# Patient Record
Sex: FEMALE | Race: WHITE | NOT HISPANIC OR LATINO | Employment: OTHER
[De-identification: names, ages, dates, MRNs, and addresses within clinical notes are randomized per-mention and may not be internally consistent; named-entity substitution may affect disease eponyms.]

---

## 2017-01-31 ENCOUNTER — RECORDS - HEALTHEAST (OUTPATIENT)
Dept: ADMINISTRATIVE | Facility: OTHER | Age: 62
End: 2017-01-31

## 2017-03-02 ENCOUNTER — RECORDS - HEALTHEAST (OUTPATIENT)
Dept: ADMINISTRATIVE | Facility: OTHER | Age: 62
End: 2017-03-02

## 2017-04-10 ENCOUNTER — RECORDS - HEALTHEAST (OUTPATIENT)
Dept: ADMINISTRATIVE | Facility: OTHER | Age: 62
End: 2017-04-10

## 2018-01-11 ENCOUNTER — RECORDS - HEALTHEAST (OUTPATIENT)
Dept: ADMINISTRATIVE | Facility: OTHER | Age: 63
End: 2018-01-11

## 2018-01-23 ENCOUNTER — AMBULATORY - HEALTHEAST (OUTPATIENT)
Dept: NURSING | Facility: CLINIC | Age: 63
End: 2018-01-23

## 2018-01-25 ENCOUNTER — COMMUNICATION - HEALTHEAST (OUTPATIENT)
Dept: INTERNAL MEDICINE | Facility: CLINIC | Age: 63
End: 2018-01-25

## 2018-02-22 ENCOUNTER — AMBULATORY - HEALTHEAST (OUTPATIENT)
Dept: NURSING | Facility: CLINIC | Age: 63
End: 2018-02-22

## 2018-03-15 ENCOUNTER — OFFICE VISIT - HEALTHEAST (OUTPATIENT)
Dept: INTERNAL MEDICINE | Facility: CLINIC | Age: 63
End: 2018-03-15

## 2018-03-15 DIAGNOSIS — E87.1 HYPONATREMIA: ICD-10-CM

## 2018-03-15 DIAGNOSIS — Z98.84 BARIATRIC SURGERY STATUS: ICD-10-CM

## 2018-03-15 DIAGNOSIS — N20.0 CALCULUS OF KIDNEY: ICD-10-CM

## 2018-03-15 DIAGNOSIS — G47.33 OBSTRUCTIVE SLEEP APNEA: ICD-10-CM

## 2018-03-15 DIAGNOSIS — Z00.00 HEALTH CARE MAINTENANCE: ICD-10-CM

## 2018-03-15 DIAGNOSIS — Z11.59 NEED FOR HEPATITIS C SCREENING TEST: ICD-10-CM

## 2018-03-15 DIAGNOSIS — I80.12: ICD-10-CM

## 2018-03-15 LAB
ALBUMIN SERPL-MCNC: 3.3 G/DL (ref 3.5–5)
ALBUMIN UR-MCNC: NEGATIVE MG/DL
ALP SERPL-CCNC: 49 U/L (ref 45–120)
ALT SERPL W P-5'-P-CCNC: 29 U/L (ref 0–45)
AMORPH CRY #/AREA URNS HPF: ABNORMAL /[HPF]
ANION GAP SERPL CALCULATED.3IONS-SCNC: 13 MMOL/L (ref 5–18)
APPEARANCE UR: CLEAR
AST SERPL W P-5'-P-CCNC: 27 U/L (ref 0–40)
BACTERIA #/AREA URNS HPF: ABNORMAL HPF
BILIRUB SERPL-MCNC: 0.4 MG/DL (ref 0–1)
BILIRUB UR QL STRIP: NEGATIVE
BUN SERPL-MCNC: 12 MG/DL (ref 8–22)
CALCIUM SERPL-MCNC: 9.1 MG/DL (ref 8.5–10.5)
CHLORIDE BLD-SCNC: 101 MMOL/L (ref 98–107)
CO2 SERPL-SCNC: 25 MMOL/L (ref 22–31)
COLOR UR AUTO: YELLOW
CREAT SERPL-MCNC: 0.73 MG/DL (ref 0.6–1.1)
ERYTHROCYTE [DISTWIDTH] IN BLOOD BY AUTOMATED COUNT: 11.5 % (ref 11–14.5)
FERRITIN SERPL-MCNC: 78 NG/ML (ref 10–130)
GFR SERPL CREATININE-BSD FRML MDRD: >60 ML/MIN/1.73M2
GLUCOSE BLD-MCNC: 109 MG/DL (ref 70–125)
GLUCOSE UR STRIP-MCNC: NEGATIVE MG/DL
HCT VFR BLD AUTO: 40.1 % (ref 35–47)
HGB BLD-MCNC: 13.5 G/DL (ref 12–16)
HGB UR QL STRIP: ABNORMAL
IRON SATN MFR SERPL: 39 % (ref 20–50)
IRON SERPL-MCNC: 135 UG/DL (ref 42–175)
KETONES UR STRIP-MCNC: NEGATIVE MG/DL
LEUKOCYTE ESTERASE UR QL STRIP: ABNORMAL
MCH RBC QN AUTO: 32.1 PG (ref 27–34)
MCHC RBC AUTO-ENTMCNC: 33.8 G/DL (ref 32–36)
MCV RBC AUTO: 95 FL (ref 80–100)
NITRATE UR QL: NEGATIVE
PH UR STRIP: 7 [PH] (ref 5–8)
PLATELET # BLD AUTO: 345 THOU/UL (ref 140–440)
PMV BLD AUTO: 7.7 FL (ref 7–10)
POTASSIUM BLD-SCNC: 4.7 MMOL/L (ref 3.5–5)
PROT SERPL-MCNC: 5.9 G/DL (ref 6–8)
RBC # BLD AUTO: 4.22 MILL/UL (ref 3.8–5.4)
RBC #/AREA URNS AUTO: ABNORMAL HPF
SODIUM SERPL-SCNC: 139 MMOL/L (ref 136–145)
SP GR UR STRIP: 1.01 (ref 1–1.03)
SQUAMOUS #/AREA URNS AUTO: ABNORMAL LPF
TIBC SERPL-MCNC: 343 UG/DL (ref 313–563)
TRANSFERRIN SERPL-MCNC: 275 MG/DL (ref 212–360)
UROBILINOGEN UR STRIP-ACNC: ABNORMAL
WBC #/AREA URNS AUTO: ABNORMAL HPF
WBC: 5.4 THOU/UL (ref 4–11)

## 2018-03-15 ASSESSMENT — MIFFLIN-ST. JEOR: SCORE: 1369.84

## 2018-03-16 LAB — HCV AB SERPL QL IA: NEGATIVE

## 2018-03-22 ENCOUNTER — HOSPITAL ENCOUNTER (OUTPATIENT)
Dept: MAMMOGRAPHY | Facility: CLINIC | Age: 63
Discharge: HOME | End: 2018-03-22
Attending: INTERNAL MEDICINE

## 2018-03-22 DIAGNOSIS — Z12.31 VISIT FOR SCREENING MAMMOGRAM: ICD-10-CM

## 2018-03-26 ENCOUNTER — AMBULATORY - HEALTHEAST (OUTPATIENT)
Dept: NURSING | Facility: CLINIC | Age: 63
End: 2018-03-26

## 2018-04-26 ENCOUNTER — AMBULATORY - HEALTHEAST (OUTPATIENT)
Dept: NURSING | Facility: CLINIC | Age: 63
End: 2018-04-26

## 2018-05-24 ENCOUNTER — COMMUNICATION - HEALTHEAST (OUTPATIENT)
Dept: INTERNAL MEDICINE | Facility: CLINIC | Age: 63
End: 2018-05-24

## 2018-05-25 ENCOUNTER — AMBULATORY - HEALTHEAST (OUTPATIENT)
Dept: NURSING | Facility: CLINIC | Age: 63
End: 2018-05-25

## 2018-06-26 ENCOUNTER — AMBULATORY - HEALTHEAST (OUTPATIENT)
Dept: NURSING | Facility: CLINIC | Age: 63
End: 2018-06-26

## 2018-07-26 ENCOUNTER — AMBULATORY - HEALTHEAST (OUTPATIENT)
Dept: NURSING | Facility: CLINIC | Age: 63
End: 2018-07-26

## 2018-08-28 ENCOUNTER — AMBULATORY - HEALTHEAST (OUTPATIENT)
Dept: NURSING | Facility: CLINIC | Age: 63
End: 2018-08-28

## 2018-09-17 ENCOUNTER — OFFICE VISIT - HEALTHEAST (OUTPATIENT)
Dept: INTERNAL MEDICINE | Facility: CLINIC | Age: 63
End: 2018-09-17

## 2018-09-17 DIAGNOSIS — I10 ESSENTIAL HYPERTENSION: ICD-10-CM

## 2018-09-28 ENCOUNTER — AMBULATORY - HEALTHEAST (OUTPATIENT)
Dept: NURSING | Facility: CLINIC | Age: 63
End: 2018-09-28

## 2018-10-25 ENCOUNTER — AMBULATORY - HEALTHEAST (OUTPATIENT)
Dept: INTERNAL MEDICINE | Facility: CLINIC | Age: 63
End: 2018-10-25

## 2018-10-29 ENCOUNTER — AMBULATORY - HEALTHEAST (OUTPATIENT)
Dept: NURSING | Facility: CLINIC | Age: 63
End: 2018-10-29

## 2018-10-29 DIAGNOSIS — Z23 NEED FOR VACCINATION: ICD-10-CM

## 2018-11-29 ENCOUNTER — AMBULATORY - HEALTHEAST (OUTPATIENT)
Dept: NURSING | Facility: CLINIC | Age: 63
End: 2018-11-29

## 2018-12-31 ENCOUNTER — AMBULATORY - HEALTHEAST (OUTPATIENT)
Dept: NURSING | Facility: CLINIC | Age: 63
End: 2018-12-31

## 2019-01-31 ENCOUNTER — AMBULATORY - HEALTHEAST (OUTPATIENT)
Dept: NURSING | Facility: CLINIC | Age: 64
End: 2019-01-31

## 2019-03-05 ENCOUNTER — AMBULATORY - HEALTHEAST (OUTPATIENT)
Dept: NURSING | Facility: CLINIC | Age: 64
End: 2019-03-05

## 2019-03-28 ENCOUNTER — OFFICE VISIT - HEALTHEAST (OUTPATIENT)
Dept: INTERNAL MEDICINE | Facility: CLINIC | Age: 64
End: 2019-03-28

## 2019-03-28 DIAGNOSIS — Z00.00 HEALTHCARE MAINTENANCE: ICD-10-CM

## 2019-03-28 DIAGNOSIS — G47.33 OBSTRUCTIVE SLEEP APNEA: ICD-10-CM

## 2019-03-28 DIAGNOSIS — Z86.0100 HISTORY OF COLONIC POLYPS: ICD-10-CM

## 2019-03-28 DIAGNOSIS — Z51.81 MEDICATION MONITORING ENCOUNTER: ICD-10-CM

## 2019-03-28 DIAGNOSIS — I10 ESSENTIAL HYPERTENSION: ICD-10-CM

## 2019-03-28 DIAGNOSIS — Z00.00 ROUTINE GENERAL MEDICAL EXAMINATION AT A HEALTH CARE FACILITY: ICD-10-CM

## 2019-03-28 DIAGNOSIS — E55.9 VITAMIN D DEFICIENCY: ICD-10-CM

## 2019-03-28 DIAGNOSIS — Z98.84 BARIATRIC SURGERY STATUS: ICD-10-CM

## 2019-03-28 DIAGNOSIS — N20.0 CALCULUS OF KIDNEY: ICD-10-CM

## 2019-03-28 DIAGNOSIS — L72.0 MILIUM CYST: ICD-10-CM

## 2019-03-28 LAB
ALBUMIN SERPL-MCNC: 3.6 G/DL (ref 3.5–5)
ALP SERPL-CCNC: 43 U/L (ref 45–120)
ALT SERPL W P-5'-P-CCNC: 16 U/L (ref 0–45)
ANION GAP SERPL CALCULATED.3IONS-SCNC: 11 MMOL/L (ref 5–18)
AST SERPL W P-5'-P-CCNC: 17 U/L (ref 0–40)
BILIRUB SERPL-MCNC: 0.4 MG/DL (ref 0–1)
BUN SERPL-MCNC: 11 MG/DL (ref 8–22)
CALCIUM SERPL-MCNC: 9.3 MG/DL (ref 8.5–10.5)
CHLORIDE BLD-SCNC: 97 MMOL/L (ref 98–107)
CHOLEST SERPL-MCNC: 187 MG/DL
CO2 SERPL-SCNC: 28 MMOL/L (ref 22–31)
CREAT SERPL-MCNC: 0.73 MG/DL (ref 0.6–1.1)
ERYTHROCYTE [DISTWIDTH] IN BLOOD BY AUTOMATED COUNT: 11.4 % (ref 11–14.5)
FASTING STATUS PATIENT QL REPORTED: YES
GFR SERPL CREATININE-BSD FRML MDRD: >60 ML/MIN/1.73M2
GLUCOSE BLD-MCNC: 95 MG/DL (ref 70–125)
HCT VFR BLD AUTO: 39.3 % (ref 35–47)
HDLC SERPL-MCNC: 55 MG/DL
HGB BLD-MCNC: 13 G/DL (ref 12–16)
LDLC SERPL CALC-MCNC: 105 MG/DL
MCH RBC QN AUTO: 31.8 PG (ref 27–34)
MCHC RBC AUTO-ENTMCNC: 33.1 G/DL (ref 32–36)
MCV RBC AUTO: 96 FL (ref 80–100)
PLATELET # BLD AUTO: 328 THOU/UL (ref 140–440)
PMV BLD AUTO: 8.1 FL (ref 7–10)
POTASSIUM BLD-SCNC: 3.7 MMOL/L (ref 3.5–5)
PROT SERPL-MCNC: 5.7 G/DL (ref 6–8)
RBC # BLD AUTO: 4.1 MILL/UL (ref 3.8–5.4)
SODIUM SERPL-SCNC: 136 MMOL/L (ref 136–145)
TRIGL SERPL-MCNC: 133 MG/DL
WBC: 5.6 THOU/UL (ref 4–11)

## 2019-03-28 ASSESSMENT — MIFFLIN-ST. JEOR: SCORE: 1333.17

## 2019-03-29 ENCOUNTER — COMMUNICATION - HEALTHEAST (OUTPATIENT)
Dept: INTERNAL MEDICINE | Facility: CLINIC | Age: 64
End: 2019-03-29

## 2019-03-29 DIAGNOSIS — L72.0 MILIUM CYST: ICD-10-CM

## 2019-03-29 LAB — 25(OH)D3 SERPL-MCNC: 30.1 NG/ML (ref 30–80)

## 2019-04-05 ENCOUNTER — HOSPITAL ENCOUNTER (OUTPATIENT)
Dept: MAMMOGRAPHY | Facility: CLINIC | Age: 64
Discharge: HOME | End: 2019-04-05
Attending: INTERNAL MEDICINE

## 2019-04-05 DIAGNOSIS — Z12.31 VISIT FOR SCREENING MAMMOGRAM: ICD-10-CM

## 2019-04-08 ENCOUNTER — AMBULATORY - HEALTHEAST (OUTPATIENT)
Dept: NURSING | Facility: CLINIC | Age: 64
End: 2019-04-08

## 2019-04-15 ENCOUNTER — RECORDS - HEALTHEAST (OUTPATIENT)
Dept: ADMINISTRATIVE | Facility: OTHER | Age: 64
End: 2019-04-15

## 2019-04-23 ENCOUNTER — RECORDS - HEALTHEAST (OUTPATIENT)
Dept: ADMINISTRATIVE | Facility: OTHER | Age: 64
End: 2019-04-23

## 2019-05-09 ENCOUNTER — COMMUNICATION - HEALTHEAST (OUTPATIENT)
Dept: INTERNAL MEDICINE | Facility: CLINIC | Age: 64
End: 2019-05-09

## 2019-05-09 ENCOUNTER — AMBULATORY - HEALTHEAST (OUTPATIENT)
Dept: NURSING | Facility: CLINIC | Age: 64
End: 2019-05-09

## 2019-05-24 ENCOUNTER — OFFICE VISIT - HEALTHEAST (OUTPATIENT)
Dept: INTERNAL MEDICINE | Facility: CLINIC | Age: 64
End: 2019-05-24

## 2019-05-24 DIAGNOSIS — I10 ESSENTIAL HYPERTENSION: ICD-10-CM

## 2019-05-24 DIAGNOSIS — G47.33 OBSTRUCTIVE SLEEP APNEA: ICD-10-CM

## 2019-05-24 RX ORDER — FERROUS SULFATE 325(65) MG
325 TABLET ORAL
Status: SHIPPED | COMMUNITY
Start: 2013-10-09

## 2019-06-14 ENCOUNTER — AMBULATORY - HEALTHEAST (OUTPATIENT)
Dept: NURSING | Facility: CLINIC | Age: 64
End: 2019-06-14

## 2019-06-14 ENCOUNTER — OFFICE VISIT - HEALTHEAST (OUTPATIENT)
Dept: INTERNAL MEDICINE | Facility: CLINIC | Age: 64
End: 2019-06-14

## 2019-06-14 DIAGNOSIS — I10 ESSENTIAL HYPERTENSION: ICD-10-CM

## 2019-06-14 LAB
ANION GAP SERPL CALCULATED.3IONS-SCNC: 10 MMOL/L (ref 5–18)
BUN SERPL-MCNC: 10 MG/DL (ref 8–22)
CALCIUM SERPL-MCNC: 9.3 MG/DL (ref 8.5–10.5)
CHLORIDE BLD-SCNC: 102 MMOL/L (ref 98–107)
CO2 SERPL-SCNC: 26 MMOL/L (ref 22–31)
CREAT SERPL-MCNC: 0.68 MG/DL (ref 0.6–1.1)
GFR SERPL CREATININE-BSD FRML MDRD: >60 ML/MIN/1.73M2
GLUCOSE BLD-MCNC: 85 MG/DL (ref 70–125)
POTASSIUM BLD-SCNC: 4.2 MMOL/L (ref 3.5–5)
SODIUM SERPL-SCNC: 138 MMOL/L (ref 136–145)

## 2019-06-28 ENCOUNTER — OFFICE VISIT - HEALTHEAST (OUTPATIENT)
Dept: INTERNAL MEDICINE | Facility: CLINIC | Age: 64
End: 2019-06-28

## 2019-06-28 DIAGNOSIS — I10 ESSENTIAL HYPERTENSION: ICD-10-CM

## 2019-06-28 LAB
ANION GAP SERPL CALCULATED.3IONS-SCNC: 9 MMOL/L (ref 5–18)
BUN SERPL-MCNC: 10 MG/DL (ref 8–22)
CALCIUM SERPL-MCNC: 9.3 MG/DL (ref 8.5–10.5)
CHLORIDE BLD-SCNC: 97 MMOL/L (ref 98–107)
CO2 SERPL-SCNC: 28 MMOL/L (ref 22–31)
CREAT SERPL-MCNC: 0.71 MG/DL (ref 0.6–1.1)
GFR SERPL CREATININE-BSD FRML MDRD: >60 ML/MIN/1.73M2
GLUCOSE BLD-MCNC: 97 MG/DL (ref 70–125)
POTASSIUM BLD-SCNC: 4.8 MMOL/L (ref 3.5–5)
SODIUM SERPL-SCNC: 134 MMOL/L (ref 136–145)

## 2019-07-15 ENCOUNTER — AMBULATORY - HEALTHEAST (OUTPATIENT)
Dept: NURSING | Facility: CLINIC | Age: 64
End: 2019-07-15

## 2019-08-22 ENCOUNTER — OFFICE VISIT - HEALTHEAST (OUTPATIENT)
Dept: INTERNAL MEDICINE | Facility: CLINIC | Age: 64
End: 2019-08-22

## 2019-08-22 DIAGNOSIS — G47.33 OBSTRUCTIVE SLEEP APNEA: ICD-10-CM

## 2019-08-22 DIAGNOSIS — I10 ESSENTIAL HYPERTENSION: ICD-10-CM

## 2019-08-22 DIAGNOSIS — Z98.84 BARIATRIC SURGERY STATUS: ICD-10-CM

## 2019-08-22 DIAGNOSIS — L30.9 ECZEMA, UNSPECIFIED TYPE: ICD-10-CM

## 2019-08-22 RX ORDER — TRIAMCINOLONE ACETONIDE 1 MG/G
CREAM TOPICAL
Qty: 45 G | Refills: 1 | Status: SHIPPED | OUTPATIENT
Start: 2019-08-22

## 2019-09-23 ENCOUNTER — AMBULATORY - HEALTHEAST (OUTPATIENT)
Dept: NURSING | Facility: CLINIC | Age: 64
End: 2019-09-23

## 2019-10-25 ENCOUNTER — AMBULATORY - HEALTHEAST (OUTPATIENT)
Dept: INTERNAL MEDICINE | Facility: CLINIC | Age: 64
End: 2019-10-25

## 2019-10-25 ENCOUNTER — AMBULATORY - HEALTHEAST (OUTPATIENT)
Dept: NURSING | Facility: CLINIC | Age: 64
End: 2019-10-25

## 2019-10-25 DIAGNOSIS — Z23 NEED FOR INFLUENZA VACCINATION: ICD-10-CM

## 2019-10-25 DIAGNOSIS — E53.8 VITAMIN B12 DEFICIENCY (NON ANEMIC): ICD-10-CM

## 2019-11-04 ENCOUNTER — RECORDS - HEALTHEAST (OUTPATIENT)
Dept: ADMINISTRATIVE | Facility: OTHER | Age: 64
End: 2019-11-04

## 2019-11-15 ENCOUNTER — COMMUNICATION - HEALTHEAST (OUTPATIENT)
Dept: INTERNAL MEDICINE | Facility: CLINIC | Age: 64
End: 2019-11-15

## 2019-11-15 DIAGNOSIS — I10 ESSENTIAL HYPERTENSION: ICD-10-CM

## 2019-11-26 ENCOUNTER — AMBULATORY - HEALTHEAST (OUTPATIENT)
Dept: NURSING | Facility: CLINIC | Age: 64
End: 2019-11-26

## 2019-12-27 ENCOUNTER — AMBULATORY - HEALTHEAST (OUTPATIENT)
Dept: NURSING | Facility: CLINIC | Age: 64
End: 2019-12-27

## 2020-01-27 ENCOUNTER — AMBULATORY - HEALTHEAST (OUTPATIENT)
Dept: NURSING | Facility: CLINIC | Age: 65
End: 2020-01-27

## 2020-02-28 ENCOUNTER — AMBULATORY - HEALTHEAST (OUTPATIENT)
Dept: NURSING | Facility: CLINIC | Age: 65
End: 2020-02-28

## 2020-03-30 ENCOUNTER — AMBULATORY - HEALTHEAST (OUTPATIENT)
Dept: NURSING | Facility: CLINIC | Age: 65
End: 2020-03-30

## 2020-04-30 ENCOUNTER — AMBULATORY - HEALTHEAST (OUTPATIENT)
Dept: NURSING | Facility: CLINIC | Age: 65
End: 2020-04-30

## 2020-06-01 ENCOUNTER — AMBULATORY - HEALTHEAST (OUTPATIENT)
Dept: NURSING | Facility: CLINIC | Age: 65
End: 2020-06-01

## 2020-07-06 ENCOUNTER — HOSPITAL ENCOUNTER (OUTPATIENT)
Dept: MAMMOGRAPHY | Facility: CLINIC | Age: 65
Discharge: HOME | End: 2020-07-06
Attending: INTERNAL MEDICINE

## 2020-07-06 ENCOUNTER — COMMUNICATION - HEALTHEAST (OUTPATIENT)
Dept: INTERNAL MEDICINE | Facility: CLINIC | Age: 65
End: 2020-07-06

## 2020-07-06 DIAGNOSIS — Z12.31 VISIT FOR SCREENING MAMMOGRAM: ICD-10-CM

## 2020-07-06 DIAGNOSIS — I10 ESSENTIAL HYPERTENSION: ICD-10-CM

## 2020-07-07 ENCOUNTER — AMBULATORY - HEALTHEAST (OUTPATIENT)
Dept: NURSING | Facility: CLINIC | Age: 65
End: 2020-07-07

## 2020-08-19 ENCOUNTER — COMMUNICATION - HEALTHEAST (OUTPATIENT)
Dept: INTERNAL MEDICINE | Facility: CLINIC | Age: 65
End: 2020-08-19

## 2020-08-19 DIAGNOSIS — I10 ESSENTIAL HYPERTENSION: ICD-10-CM

## 2020-08-20 ENCOUNTER — OFFICE VISIT - HEALTHEAST (OUTPATIENT)
Dept: INTERNAL MEDICINE | Facility: CLINIC | Age: 65
End: 2020-08-20

## 2020-08-20 DIAGNOSIS — E55.9 VITAMIN D DEFICIENCY: ICD-10-CM

## 2020-08-20 DIAGNOSIS — G47.33 OBSTRUCTIVE SLEEP APNEA: ICD-10-CM

## 2020-08-20 DIAGNOSIS — Z98.84 BARIATRIC SURGERY STATUS: ICD-10-CM

## 2020-08-20 DIAGNOSIS — Z86.0100 HISTORY OF COLONIC POLYPS: ICD-10-CM

## 2020-08-20 DIAGNOSIS — I10 ESSENTIAL HYPERTENSION: ICD-10-CM

## 2020-08-20 DIAGNOSIS — Z00.00 HEALTHCARE MAINTENANCE: ICD-10-CM

## 2020-08-20 DIAGNOSIS — Z51.81 MEDICATION MONITORING ENCOUNTER: ICD-10-CM

## 2020-08-20 DIAGNOSIS — E53.8 VITAMIN B12 DEFICIENCY (NON ANEMIC): ICD-10-CM

## 2020-08-20 DIAGNOSIS — Z00.00 ROUTINE GENERAL MEDICAL EXAMINATION AT A HEALTH CARE FACILITY: ICD-10-CM

## 2020-08-20 LAB
ALBUMIN SERPL-MCNC: 3.3 G/DL (ref 3.5–5)
ALP SERPL-CCNC: 41 U/L (ref 45–120)
ALT SERPL W P-5'-P-CCNC: 20 U/L (ref 0–45)
ANION GAP SERPL CALCULATED.3IONS-SCNC: 11 MMOL/L (ref 5–18)
AST SERPL W P-5'-P-CCNC: 20 U/L (ref 0–40)
BILIRUB SERPL-MCNC: 0.5 MG/DL (ref 0–1)
BUN SERPL-MCNC: 12 MG/DL (ref 8–22)
CALCIUM SERPL-MCNC: 9.2 MG/DL (ref 8.5–10.5)
CHLORIDE BLD-SCNC: 101 MMOL/L (ref 98–107)
CHOLEST SERPL-MCNC: 201 MG/DL
CO2 SERPL-SCNC: 29 MMOL/L (ref 22–31)
CREAT SERPL-MCNC: 0.75 MG/DL (ref 0.6–1.1)
ERYTHROCYTE [DISTWIDTH] IN BLOOD BY AUTOMATED COUNT: 11.2 % (ref 11–14.5)
FASTING STATUS PATIENT QL REPORTED: YES
GFR SERPL CREATININE-BSD FRML MDRD: >60 ML/MIN/1.73M2
GLUCOSE BLD-MCNC: 116 MG/DL (ref 70–125)
HCT VFR BLD AUTO: 39.3 % (ref 35–47)
HDLC SERPL-MCNC: 55 MG/DL
HGB BLD-MCNC: 13.4 G/DL (ref 12–16)
LDLC SERPL CALC-MCNC: 115 MG/DL
MCH RBC QN AUTO: 32.9 PG (ref 27–34)
MCHC RBC AUTO-ENTMCNC: 34.1 G/DL (ref 32–36)
MCV RBC AUTO: 96 FL (ref 80–100)
PLATELET # BLD AUTO: 312 THOU/UL (ref 140–440)
PMV BLD AUTO: 8.1 FL (ref 7–10)
POTASSIUM BLD-SCNC: 4 MMOL/L (ref 3.5–5)
PROT SERPL-MCNC: 5.4 G/DL (ref 6–8)
RBC # BLD AUTO: 4.07 MILL/UL (ref 3.8–5.4)
SODIUM SERPL-SCNC: 141 MMOL/L (ref 136–145)
TRIGL SERPL-MCNC: 156 MG/DL
WBC: 5.2 THOU/UL (ref 4–11)

## 2020-08-20 ASSESSMENT — MIFFLIN-ST. JEOR: SCORE: 1351.32

## 2020-08-21 LAB
25(OH)D3 SERPL-MCNC: 33 NG/ML (ref 30–80)
25(OH)D3 SERPL-MCNC: 33 NG/ML (ref 30–80)

## 2020-09-21 ENCOUNTER — AMBULATORY - HEALTHEAST (OUTPATIENT)
Dept: NURSING | Facility: CLINIC | Age: 65
End: 2020-09-21

## 2020-09-21 DIAGNOSIS — Z23 NEED FOR VACCINATION: ICD-10-CM

## 2020-09-28 ENCOUNTER — COMMUNICATION - HEALTHEAST (OUTPATIENT)
Dept: INTERNAL MEDICINE | Facility: CLINIC | Age: 65
End: 2020-09-28

## 2020-09-28 DIAGNOSIS — I10 ESSENTIAL HYPERTENSION: ICD-10-CM

## 2020-10-01 RX ORDER — LOSARTAN POTASSIUM 25 MG/1
TABLET ORAL
Qty: 90 TABLET | Refills: 3 | Status: SHIPPED | OUTPATIENT
Start: 2020-10-01 | End: 2021-08-23

## 2020-10-20 ENCOUNTER — AMBULATORY - HEALTHEAST (OUTPATIENT)
Dept: NURSING | Facility: CLINIC | Age: 65
End: 2020-10-20

## 2020-10-20 DIAGNOSIS — Z98.84 BARIATRIC SURGERY STATUS: ICD-10-CM

## 2020-11-20 ENCOUNTER — AMBULATORY - HEALTHEAST (OUTPATIENT)
Dept: NURSING | Facility: CLINIC | Age: 65
End: 2020-11-20

## 2020-12-21 ENCOUNTER — AMBULATORY - HEALTHEAST (OUTPATIENT)
Dept: NURSING | Facility: CLINIC | Age: 65
End: 2020-12-21

## 2021-01-25 ENCOUNTER — AMBULATORY - HEALTHEAST (OUTPATIENT)
Dept: NURSING | Facility: CLINIC | Age: 66
End: 2021-01-25

## 2021-02-16 ENCOUNTER — COMMUNICATION - HEALTHEAST (OUTPATIENT)
Dept: INTERNAL MEDICINE | Facility: CLINIC | Age: 66
End: 2021-02-16

## 2021-02-16 DIAGNOSIS — I10 ESSENTIAL HYPERTENSION: ICD-10-CM

## 2021-02-16 RX ORDER — HYDROCHLOROTHIAZIDE 25 MG/1
TABLET ORAL
Qty: 90 TABLET | Refills: 1 | Status: SHIPPED | OUTPATIENT
Start: 2021-02-16 | End: 2021-08-23

## 2021-02-25 ENCOUNTER — AMBULATORY - HEALTHEAST (OUTPATIENT)
Dept: NURSING | Facility: CLINIC | Age: 66
End: 2021-02-25

## 2021-03-25 ENCOUNTER — AMBULATORY - HEALTHEAST (OUTPATIENT)
Dept: NURSING | Facility: CLINIC | Age: 66
End: 2021-03-25

## 2021-04-26 ENCOUNTER — AMBULATORY - HEALTHEAST (OUTPATIENT)
Dept: NURSING | Facility: CLINIC | Age: 66
End: 2021-04-26

## 2021-05-25 ENCOUNTER — AMBULATORY - HEALTHEAST (OUTPATIENT)
Dept: NURSING | Facility: CLINIC | Age: 66
End: 2021-05-25

## 2021-05-27 NOTE — PROGRESS NOTES
Assessment and Plan:     Walk 10 minutes a day in your house.  Daily walking can reduce blood pressure and reduce pain and achiness of the muscles.    Check blood pressure and write down numbers.  Bring list of blood pressures and your blood pressure cuff to clinic in approximately 2 months to reevaluate blood pressure.    Maintain a low-salt diet.    Goal blood pressure less than 135/85.    Continue on current hydrochlorothiazide at this time.    [facility] will contact you for your colonoscopy.    Proceed with mammogram on April 5 as planned.    Pneumovax 23 vaccine given today.  If you get a sore shoulder, you can use Tylenol and ice as needed.  Redness and pain generally last 2-3 days, if you get it.      1. Healthcare maintenance  Main emphasis for patient is working on regular walking and weight loss.    Low to moderate cardiovascular risk factors.  Her mother did have some coronary artery disease.  Her father had diabetes.  Patient's cholesterol is well controlled without medication.    She does have the hypertension issue.  Non-smoker.    Her mother had macular degeneration.  Patient sees the ophthalmologist yearly in the fall, negative on last fall's exam.  - Full code, but no prolonged artificial life support of catastrophic event with poor prognosis.  Discussed with patient today.  - Pneumococcal polysaccharide vaccine 23-valent 1 yo or older, subq/IM, had Prevnar 13 last year    Status post hysterectomy.  Still has ovaries.  No vaginal discharge or bleeding.    Mammogram scheduled April 5    I did discuss daily aspirin use.  I discussed the bleeding risk with aspirin.  She is just low to moderate cardiovascular risk and may consider discontinuing the aspirin.  I discussed ulcer bleeding risk and intracranial bleeding risk with aspirin.  She does wish to continue at this time.  She also has a history of DVT and will continue on low-dose aspirin with that.    Patient was told to hold her  aspirin for 1 week prior to the colonoscopy, however.    2. History of colonic polyps  5-year colonoscopy due.  Bowels are normal.  No blood in stool.  - Ambulatory referral for Colonoscopy    3. Essential hypertension  Not controlled today.  Was 145/70 on my recheck.    Continue HCTZ 25 mg a day.  Follow-up in 2 months to reevaluate blood pressure.    She has some chronic lower extremity edema from previous DVT.  I would not add amlodipine.  Could consider low-dose lisinopril.    Not anticipating need for statin  - Lipid Cascade    4. Post-gastric Bypass For Obesity  Gastric bloating was improved with the probiotic.    She is taking a daily iron supplement, 2000 IU of vitamin D, gets monthly B12 injections, and other vitamins.    5. Nephrolithiasis  No recent event    6. Obstructive sleep apnea  Compliant with CPAP.  She does not feel she needs to return to the sleep clinic.  She feels her machine is working well.  Denies significant daytime sleepiness.  - CPAP Prescription    I did tell patient that if further details needed for CPAP prescriptions or if she needs a new CPAP machine, she would need to go back to the sleep clinic.    Patient is tolerating her CPAP, highly compliant with CPAP.  She is benefiting from her CPAP machine and should continue on it.    7. Medication monitoring encounter    - Comprehensive Metabolic Panel  - HM2(CBC w/o Differential)    8. Vitamin D deficiency  Adjust supplement if needed  - Vitamin D, Total (25-Hydroxy)    Mild osteopenia 2016.  Repeat DEXA scan in 1-2 years.    9. Milium cyst  Small cyst on the medial aspect of her middle finger.  No pain.  She was offered a referral to dermatology to remove it.  She declined referral.  She was warned about infection risk.    10. Routine general medical examination at a health care facility  January 2016 DEXA scan with a spine score of -1.0.  Femur score -1.4/-1.3.  Moderate trabecular bone.  Patient did not want to repeat a DEXA scan at  this time.  Consider repeat DEXA in 1-2 years.    Past history of DVT over 10 years ago of her left leg.  Some chronic residual lower extremity edema is stable.  She was on warfarin in the past.  No longer on anticoagulation except for the aspirin 81 mg.  Patient was warned that a past history of DVT but does put her at high risk for future DVT.  If she has acute increased leg swelling or pain to get evaluated right away.    The patient's current medical problems were reviewed.    I have had an Advance Directives discussion with the patient.  Full code but no prolonged artificial life support of catastrophic event.  Discussed with patient today.  The following health maintenance schedule was reviewed with the patient and provided in printed form in the after visit summary:   Health Maintenance   Topic Date Due     ZOSTER VACCINES (2 of 2) 08/01/2016     DXA SCAN  06/08/2018     COLONOSCOPY  02/04/2019     MAMMOGRAM  03/22/2019     PNEUMOCOCCAL POLYSACCHARIDE VACCINE AGE 65 AND OVER  03/29/2019 (Originally 1/28/2018)     FALL RISK ASSESSMENT  09/17/2019     TD 18+ HE  01/08/2023     ADVANCE DIRECTIVES DISCUSSED WITH PATIENT  03/15/2023     INFLUENZA VACCINE RULE BASED  Completed     PNEUMOCOCCAL CONJUGATE VACCINE FOR ADULTS (PCV13 OR PREVNAR)  Completed        Subjective:   Chief Complaint: Dandy Mejia is an 64 y.o. female here for an Annual Wellness visit.   HPI: Former Dr. Pineda patient.  Here to establish care.    Single, lives independently.    Status post gastric bypass, Jennifer-en-Y and stapling.  Bowels are regular.  She did have some bloating issues and upper abdominal discomfort in the past but that resolved with probiotic.    Iron levels were normal last year.  Normal liver tests and hemoglobin.    Negative hepatitis C test March 2018.    2016 triglycerides 178, HDL 59 and .  She has not been on a statin drug.    She is never smoked.  No new cough or increasing shortness of breath.    No history  "of sinusitis or asthma.    Her father did have diabetes and her mother had coronary artery disease.    Past history of nephrolithiasis but without recent symptoms.  No UTI symptoms or hematuria.    Mammogram negative March 2018.  No family history of early breast cancer.    Status post cholecystectomy.    She sees a chiropractor.  She does have the diagnosis of fibromyalgia but denies severe muscle skeletal pain currently.  Some mild stiffness and achiness.    Review of Systems: No falls.  Please see above.  The rest of the review of systems are negative for all systems.    Patient Care Team:  Suman Doshi MD as PCP - General (Internal Medicine)     Patient Active Problem List   Diagnosis     Obstructive sleep apnea     Thrombophlebitis Of The Left Deep Femoral Vein     Post-gastric Bypass For Obesity     Nephrolithiasis     Adenomatous colon polyp (02/2014)     Essential hypertension     History reviewed. No pertinent past medical history.   Past Surgical History:   Procedure Laterality Date     CARPAL TUNNEL RELEASE Right      CHOLECYSTECTOMY       GASTRIC BYPASS  1980     TONSILLECTOMY       TOTAL ABDOMINAL HYSTERECTOMY  1993     TUBAL LIGATION        Family History   Problem Relation Age of Onset     Heart failure Mother      Atrial fibrillation Mother      Macular degeneration Mother      Diabetes type II Father         Older-age onset of diabetes for multiple family members in father's side     Cancer Sister         \"Female cancer wih lymph node removal\"     Glaucoma Sister         Being watched for glaucoma     Depression Sister      Fibromyalgia Sister      Glaucoma Sister       Social History     Socioeconomic History     Marital status:      Spouse name: Not on file     Number of children: Not on file     Years of education: Not on file     Highest education level: Not on file   Occupational History     Occupation: Retired    Social Needs     Financial resource strain: Not on file     " "Food insecurity:     Worry: Not on file     Inability: Not on file     Transportation needs:     Medical: Not on file     Non-medical: Not on file   Tobacco Use     Smoking status: Never Smoker     Smokeless tobacco: Never Used   Substance and Sexual Activity     Alcohol use: Yes     Alcohol/week: 3.5 oz     Types: 7 Standard drinks or equivalent per week     Drug use: No     Sexual activity: No     Partners: Male   Lifestyle     Physical activity:     Days per week: Not on file     Minutes per session: Not on file     Stress: Not on file   Relationships     Social connections:     Talks on phone: Not on file     Gets together: Not on file     Attends Restorationist service: Not on file     Active member of club or organization: Not on file     Attends meetings of clubs or organizations: Not on file     Relationship status: Not on file     Intimate partner violence:     Fear of current or ex partner: Not on file     Emotionally abused: Not on file     Physically abused: Not on file     Forced sexual activity: Not on file   Other Topics Concern     Not on file   Social History Narrative     Not on file      Current Outpatient Medications   Medication Sig Dispense Refill     aspirin 81 MG EC tablet Take 81 mg by mouth daily.       hydroCHLOROthiazide (HYDRODIURIL) 25 MG tablet Take 1 tablet (25 mg total) by mouth daily. 90 tablet 3     Current Facility-Administered Medications   Medication Dose Route Frequency Provider Last Rate Last Dose     cyanocobalamin injection 1,000 mcg  1,000 mcg Intramuscular Q30 Days Suman Doshi MD   1,000 mcg at 03/05/19 1044      Objective:   Vital Signs:   Visit Vitals  /90 (Patient Site: Right Arm, Patient Position: Sitting, Cuff Size: Adult Regular)   Pulse 64   Resp 16   Ht 5' 1.25\" (1.556 m)   Wt 190 lb (86.2 kg)   LMP 03/22/1993   SpO2 99%   BMI 35.61 kg/m         VisionScreening:  No exam data present     PHYSICAL EXAM  Moderately overweight female.  Alert and oriented x3 with " good mood and affect.  Pupils and irises equal and reactive.  Extraocular muscles intact.  External ears and nose exam is normal and tympanic membranes are normal.  Pharynx is minimally crowded.  Mild postnasal drip pharyngitis.  No leukoplakia or oral lesions.  Teeth in good condition.  No cervical or supraclavicular adenopathy.  Just mild neck adiposity.  No JVD and no carotid bruits.  Lungs are clear to auscultation with good respiratory excursion.  Spine is straight.  Heart is regular with no murmur rub or gallop.  No ankle edema on right.  She does have trace ankle edema on the left, which she says is chronic.  No calf tenderness.  Able to climb up on the exam table.  She declined breast exam.  Abdomen is moderately obese, upper scars.  Moderate diffuse tenderness to deeper palpation but especially in the epigastric area where she had her surgery.  But no guarding or rebound.  No mass.  No pulsatile mass.  I did feel her liver edge and it is normal no hepatomegaly, no splenomegaly.  Skin is normal to inspection and palpation.  Feet in good condition without significant degenerative changes.  No pre-ulcerative calluses.  +1 pedal pulses.    Assessment Results 3/28/2019   Activities of Daily Living No help needed   Instrumental Activities of Daily Living No help needed   Mini Cog Total Score 5   Some recent data might be hidden     A Mini-Cog score of 0-2 suggests the possibility of dementia, score of 3-5 suggests no dementia    Identified Health Risks:     She is at risk for lack of exercise and has been provided with information to increase physical activity for the benefit of her well-being.  Patient's advanced directive was discussed and I am comfortable with the patient's wishes.

## 2021-05-28 NOTE — TELEPHONE ENCOUNTER
Called patient and scheduled appointment with Dr. Doshi for 5/24/19.  Milena Overton CMA ............... 5:07 PM, 05/09/19

## 2021-05-29 NOTE — PROGRESS NOTES
[facility] clinic Follow Up Note    Dandy Mejia   64 y.o. female    Date of Visit: 5/24/2019    Chief Complaint   Patient presents with     Blood Pressure Check     Subjective  Dandy is here for blood pressure reassessment.  Her blood pressure was borderline high in March at 138/90.    She has been on hydrochlorothiazide 25 mg a day for a number of years.    She has a distant history of a DVT over 10 years ago and does have moderate obesity with sleep apnea.  He does have some chronic lower extremity edema, mild.  That is stable.    She is compliant with CPAP and is working well with minimal daytime sleepiness.    She does not get regular exercise.  She has some mild fibromyalgia but does see a chiropractor.  No exacerbation of her pain at this time.    She has checked her blood pressure multiple times and did bring in her wrist cuff machine today.    Her wrist cuff showed 132/80.  I rechecked her blood pressure myself and it was 142/76.    Her blood pressures at home are averaging around 135/70.    No chest pain or palpitations.    Status post gastric bypass and cholecystectomy.    History of nephrolithiasis but not recent.  Previous kidney labs are normal, with potassium level 3.7 on March 28, 2019, labs reviewed by me today.    PMHx:  No past medical history on file.  PSHx:    Past Surgical History:   Procedure Laterality Date     CARPAL TUNNEL RELEASE Right      CHOLECYSTECTOMY       GASTRIC BYPASS  1980     TONSILLECTOMY       TOTAL ABDOMINAL HYSTERECTOMY  1993     TUBAL LIGATION       Immunizations:   Immunization History   Administered Date(s) Administered     DT (pediatric) 02/05/2004     Influenza [id number], 01/13/2010     Influenza high dose, seasonal 10/29/2018     Influenza, Seasonal, Inj PF IIV3 10/22/2010, 10/27/2011, 11/14/2012     Influenza, inj, historic,unspecified 12/10/2007, 11/11/2008, 03/15/2018     Influenza, seasonal,quad inj 36+ mos 10/27/2015     Influenza, seasonal,quad inj 6-35  mos 10/23/2014, 10/25/2016, 10/16/2017     Pneumo Conj 13-V (2010&after) 03/15/2018     Pneumo Polysac 23-V 03/28/2019     Td,adult,historic,unspecified 02/05/2004, 01/08/2013     Tdap 01/08/2013       ROS A comprehensive review of systems was performed and was otherwise negative    Medications, allergies, and problem list were reviewed and updated    Exam  /78 (Patient Site: Right Arm, Patient Position: Sitting, Cuff Size: Adult Large)   Pulse 64   LMP 03/22/1993   Appears well.  Heart is regular without murmur.  Lungs clear.  She does have trace bilateral edema slightly worse on the left leg.    Assessment/Plan  1. Essential hypertension  Borderline sub-adequately controlled.  I did give patient option to continue to work on weight loss and exercise and continue on current medications.  Patient did not feel she would realistically be able to do that.  I still encouraged her to work on weight and regular walking.    Add losartan 25 mg a day.  Initially I will have her reduce the hydrochlorothiazide down to 12.5 mg a day, but she could increase that back up if blood pressure not controlled and edema worse.    Follow-up with nurse practitioner in 2 to 3 weeks and me in 2 months.    I did warn patient about risk of kidney injury, especially of lower blood pressure.,  Also risk of higher potassiums and low blood pressure with syncope.  She accepts these risks and wishes to proceed with medication changes.      - losartan (COZAAR) 25 MG tablet; Take 1 tablet (25 mg total) by mouth daily.  Dispense: 30 tablet; Refill: 6  - hydroCHLOROthiazide (HYDRODIURIL) 25 MG tablet; Take 0.5 tablets (12.5 mg total) by mouth daily.  Dispense: 90 tablet; Refill: 3    2. Obstructive sleep apnea  Compliant with CPAP    Reviewed the mammogram from April 2019, negative.  No family history of breast cancer.    Reviewed the colonoscopy from April 2019.  3 adenomas, 3-year follow-up.    Status post hysterectomy but has  ovaries.    Osteopenia 2016 DEXA scan spine score -1.0.  Femur score -1.4/-1.3.  Moderate trabecular bone.  Plan to repeat DEXA in 1 to 3 years.  Increase regular walking.    Did see ophthalmology last fall, no problems.  Her mother had macular degeneration.    Patient never smoked.    Family history positive for diabetes in father and coronary disease in mother.    Negative hepatitis C test March 2018.    Independent living, single    Return in about 3 weeks (around 6/14/2019) for Recheck.   Patient Instructions   Add losartan 25 mg once a day.    Take that with your hydrochlorothiazide, but reduce the hydrochlorothiazide down to 12.5 mg a day by cutting pills in half.    Follow-up with nurse practitioner, Avery Crowley in approximate 3 weeks.  Do not come fasting for that visit, you will be having your potassium and kidney labs checked at that visit.    Goal blood pressure 120/70 to 130/80.    If you have significant increase in your leg swelling, or blood pressures that are running higher than 130/80, you can return to the higher dose of 25 mg of hydrochlorothiazide.    Follow-up with me in 2 to 3 months for blood pressure checkup.    Increase regular walking as much as able.            Suman Doshi MD        Current Outpatient Medications   Medication Sig Dispense Refill     aspirin 81 MG EC tablet Take 81 mg by mouth daily.       cholecalciferol, vitamin D3, 1,000 unit tablet Take 1,000 Units by mouth.       cholecalciferol, vitamin D3, 5,000 unit Tab Take by mouth.       ferrous sulfate 325 (65 FE) MG tablet Take 325 mg by mouth.       hydroCHLOROthiazide (HYDRODIURIL) 25 MG tablet Take 0.5 tablets (12.5 mg total) by mouth daily. 90 tablet 3     losartan (COZAAR) 25 MG tablet Take 1 tablet (25 mg total) by mouth daily. 30 tablet 6     Current Facility-Administered Medications   Medication Dose Route Frequency Provider Last Rate Last Dose     cyanocobalamin injection 1,000 mcg  1,000 mcg Intramuscular Q30 Days  Suman Doshi MD   1,000 mcg at 05/09/19 1113     Allergies   Allergen Reactions     Other Allergy (See Comments)      Ointment Base External Ointment, 12/10/2007.  Action: RASH.; Ointment Base External Ointment, 12/10/2007.  Action: RASH.       Social History     Tobacco Use     Smoking status: Never Smoker     Smokeless tobacco: Never Used   Substance Use Topics     Alcohol use: Yes     Alcohol/week: 3.5 oz     Types: 7 Standard drinks or equivalent per week     Drug use: No

## 2021-05-29 NOTE — PROGRESS NOTES
Clinic Note    Assessment:     Assessment and Plan:  1. Essential hypertension  Borderline controlled.  We will increase her hydrochlorothiazide to 25 mg daily and have her come back to see us in 2 weeks for recheck.  Recheck BMP today.  - losartan (COZAAR) 25 MG tablet; Take 1 tablet (25 mg total) by mouth daily.  Dispense: 90 tablet; Refill: 3  - Basic Metabolic Panel       Patient Instructions   Increase hydrochlorothiazide to 25 mg daily.  Take this in combination with your losartan.    We will recheck kidney labs and electrolytes today.  I will notify you of results on my chart.    If you develop dizziness, lightheadedness, fainting spells, fatigue or lethargy, go back to 12.5 mg dose of hydrochlorothiazide.    Follow-up with me in 2 weeks for recheck of blood pressure and recheck of labs.    Return in about 2 weeks (around 6/28/2019).         Subjective:      Patient comes to clinic today for follow-up of her hypertension.    She was initially seen by Dr. uSman Doshi on 5/24.  She had losartan added to her regimen.  She was instructed to decrease her hydrochlorothiazide to 12.5 mg.  She was instructed to follow-up with me in 2 weeks for blood pressure check and recheck of her labs.    CMP from March showed potassium 3.7.  Normal creatinine.    She brings a log of blood pressures to her appointment today.  Most are within 110s to 130s range.  A couple are in the 140 systolic range.    She continues to have a small amount of swelling in her bilateral lower extremities.  This is not overly problematic to her but she would like it to be less, if possible.    She denies any chest pain or shortness of breath.  No palpitations.    The following portions of the patient's history were reviewed and updated as appropriate: Allergies, medications, problem list, prior note.     Review of Systems:    Review is otherwise negative except for what is mentioned above.     Social Hx:    Social History     Tobacco Use   Smoking  Status Never Smoker   Smokeless Tobacco Never Used         Objective:     Vitals:    06/14/19 1044   BP: 148/80   Pulse: (!) 56   Weight: 190 lb (86.2 kg)       Exam:    General: No apparent distress. Calm. Alert and Oriented X3. Pt behavior is appropriate.  Heart/Pulses: Regular rate and rhythm, strong and equal radial pulses, no murmurs. Capillary refill <2 seconds. +1 edema in lower extremities.       Patient Active Problem List   Diagnosis     Obstructive sleep apnea     Thrombophlebitis Of The Left Deep Femoral Vein     Post-gastric Bypass For Obesity     Nephrolithiasis     Adenomatous colon polyp (02/2014)     Essential hypertension     Current Outpatient Medications   Medication Sig Dispense Refill     aspirin 81 MG EC tablet Take 81 mg by mouth daily.       cholecalciferol, vitamin D3, 1,000 unit tablet Take 1,000 Units by mouth.       cholecalciferol, vitamin D3, 5,000 unit Tab Take by mouth.       ferrous sulfate 325 (65 FE) MG tablet Take 325 mg by mouth.       hydroCHLOROthiazide (HYDRODIURIL) 25 MG tablet Take 0.5 tablets (12.5 mg total) by mouth daily. 90 tablet 3     losartan (COZAAR) 25 MG tablet Take 1 tablet (25 mg total) by mouth daily. 90 tablet 3     Current Facility-Administered Medications   Medication Dose Route Frequency Provider Last Rate Last Dose     cyanocobalamin injection 1,000 mcg  1,000 mcg Intramuscular Q30 Days Suman Doshi MD   1,000 mcg at 06/14/19 1032         Dayron Crowley CNP (Rob)    6/14/2019

## 2021-05-29 NOTE — PATIENT INSTRUCTIONS - HE
Increase hydrochlorothiazide to 25 mg daily.  Take this in combination with your losartan.    We will recheck kidney labs and electrolytes today.  I will notify you of results on my chart.    If you develop dizziness, lightheadedness, fainting spells, fatigue or lethargy, go back to 12.5 mg dose of hydrochlorothiazide.    Follow-up with me in 2 weeks for recheck of blood pressure and recheck of labs.

## 2021-05-30 NOTE — PATIENT INSTRUCTIONS - HE
Blood pressure looks excellent today.  No further changes in medications.    Recheck kidney labs and electrolytes today.    Follow-up with Dr. Lou in August as originally scheduled

## 2021-05-30 NOTE — PROGRESS NOTES
"Clinic Note    Assessment:     Assessment and Plan:  1. Essential hypertension  Blood pressure looks excellent today.  Recheck basic metabolic panel today.  See patient instructions below for plan of care.  - hydroCHLOROthiazide (HYDRODIURIL) 25 MG tablet; Take 1 tablet (25 mg total) by mouth daily.  - Basic Metabolic Panel       Patient Instructions   Blood pressure looks excellent today.  No further changes in medications.    Recheck kidney labs and electrolytes today.    Follow-up with Dr. Lou in August as originally scheduled    Return in about 2 months (around 8/28/2019).         Subjective:      Patient comes to clinic today for follow-up.    I initially saw her 2 weeks ago.  At that time, her blood pressure was 148/80.    We had her increase her hydrochlorothiazide to 25 mg.  She is also using losartan 25 mg.    Basic metabolic panel from 2 weeks ago was normal.  Creatinine 0.68.  Potassium 4.2.    She thinks she may have been feeling a bit \"foggy\" initially while using the losartan.  Those effects have since subsided.    In general, she is feeling quite well today and has no acute complaints.    The following portions of the patient's history were reviewed and updated as appropriate: Allergies, medications, problems, prior note.    Review of Systems:    Review is otherwise negative except for what is mentioned above.     Social Hx:    Social History     Tobacco Use   Smoking Status Never Smoker   Smokeless Tobacco Never Used         Objective:     Vitals:    06/28/19 1021   BP: 124/70   Pulse: 64   Weight: 190 lb (86.2 kg)       Exam:    General: No apparent distress. Calm. Alert and Oriented X3. Pt behavior is appropriate.      Patient Active Problem List   Diagnosis     Obstructive sleep apnea     Thrombophlebitis Of The Left Deep Femoral Vein     Post-gastric Bypass For Obesity     Nephrolithiasis     Adenomatous colon polyp (02/2014)     Essential hypertension     Current Outpatient Medications "   Medication Sig Dispense Refill     aspirin 81 MG EC tablet Take 81 mg by mouth daily.       cholecalciferol, vitamin D3, 1,000 unit tablet Take 1,000 Units by mouth.       cholecalciferol, vitamin D3, 5,000 unit Tab Take by mouth.       ferrous sulfate 325 (65 FE) MG tablet Take 325 mg by mouth.       hydroCHLOROthiazide (HYDRODIURIL) 25 MG tablet Take 1 tablet (25 mg total) by mouth daily.       losartan (COZAAR) 25 MG tablet Take 1 tablet (25 mg total) by mouth daily. 90 tablet 3     Current Facility-Administered Medications   Medication Dose Route Frequency Provider Last Rate Last Dose     cyanocobalamin injection 1,000 mcg  1,000 mcg Intramuscular Q30 Days Suman Doshi MD   1,000 mcg at 06/14/19 1032           Dayron Crowley CNP (Rob)    6/28/2019

## 2021-05-31 ENCOUNTER — RECORDS - HEALTHEAST (OUTPATIENT)
Dept: ADMINISTRATIVE | Facility: CLINIC | Age: 66
End: 2021-05-31

## 2021-05-31 NOTE — PATIENT INSTRUCTIONS - HE
Continue current medications.    Treat eczema spots on your face with the triamcinolone cream 3 times a day for 2 weeks.  Do not use the triamcinolone cream for longer than 2 weeks.    If it is not improving in 1 to 2 weeks, I would recommend returning to clinic for reevaluation.    If you develop significant increasing redness or swelling or pain, or especially of purulent drainage from the nose, seek medical attention right away to evaluate for possible infection.    Follow-up next April or May for physical exam.    Continue on your current vitamin D supplement.    Continue monthly vitamin B12 shots.

## 2021-05-31 NOTE — PROGRESS NOTES
[facility] clinic Follow Up Note    Dandy Mejia   64 y.o. female    Date of Visit: 8/22/2019    Chief Complaint   Patient presents with     Follow-up     Blood pressure, B-12 shot     Subjective  Dandy is here for routine follow-up on hypertension and sleep apnea.    Patient borderline elevation of blood pressure in May and I added losartan 25 mg a day and lowered HCTZ down to 12.5 mg a day temporarily.  Her blood pressure was borderline high in follow-up and she went back up on her HCTZ to 25 mg a day in June.  On the follow-up in later June her blood pressure was 124/70 and I reviewed the lab work from that time with a normal potassium and sodium and creatinine 0.7.    She feels well on current medications.  She is checked her blood pressure multiple times and is running in the 120s over 70s.    She does have a new complaint of some eczema spots on her right nostril, also behind her left earlobe where it started and on her left lacrimal area.  She been using some peroxide and over-the-counter plant oil, recently using coconut oil on those areas.  They tend to worsen or burn, especially after she used Listerine.    Past history of gastric bypass with B12 deficiency, gets monthly B12 shots.    I did review the lab work from earlier this year with a normal vitamin D level on current supplement.    She has chronic left lower extremity edema, previous DVT over 10 years ago.  Stable edema.  No new calf tenderness.  No increasing shortness of breath or chest pain.    March 2019 labs with  and HDL 55, not needing statin.    No chest pain or chest pressure or history of vascular disease.    History of nephrolithiasis without symptoms.    Never smoked.    April 2019 mammogram negative.    April 2019 colonoscopy with 3 adenomatous polyps in 3-year follow-up plan.    Osteopenia in 2016 DEXA scan    PMHx:  No past medical history on file.  PSHx:    Past Surgical History:   Procedure Laterality Date      CARPAL TUNNEL RELEASE Right      CHOLECYSTECTOMY       GASTRIC BYPASS  1980     TONSILLECTOMY       TOTAL ABDOMINAL HYSTERECTOMY  1993     TUBAL LIGATION       Immunizations:   Immunization History   Administered Date(s) Administered     DT (pediatric) 02/05/2004     Influenza [id number], 01/13/2010     Influenza high dose, seasonal 10/29/2018     Influenza, Seasonal, Inj PF IIV3 10/22/2010, 10/27/2011, 11/14/2012     Influenza, inj, historic,unspecified 12/10/2007, 11/11/2008, 03/15/2018     Influenza, seasonal,quad inj 36+ mos 10/27/2015     Influenza, seasonal,quad inj 6-35 mos 10/23/2014, 10/25/2016, 10/16/2017     Pneumo Conj 13-V (2010&after) 03/15/2018     Pneumo Polysac 23-V 03/28/2019     Td,adult,historic,unspecified 02/05/2004, 01/08/2013     Tdap 01/08/2013       ROS A comprehensive review of systems was performed and was otherwise negative    Medications, allergies, and problem list were reviewed and updated    Exam  /64 (Patient Site: Right Arm, Patient Position: Sitting, Cuff Size: Adult Large)   Pulse 64   Wt 190 lb 12.8 oz (86.5 kg)   LMP 03/22/1993   BMI 35.76 kg/m    He does have a spot of eczema with some redness and dry irritated skin with some retraction in her lower right nostril, left earlobe crease and in left lacrimal area to a very small extent.  These appear to be eczema spots.  It does not appear to be vitiligo or an infection in the nose, that appears to be dry skin with some retraction and there is no purulent discharge.  It looks like she is been putting on Listerine regularly and drying the skin quite a bit.    Lungs are clear and heart is regular without murmur.  Trace ankle edema in the left leg    Assessment/Plan  1. Essential hypertension  Controlled.  Continue current medication.  Declined blood draw for electrolyte checking today.  Labs are okay in June.    2. Obstructive sleep apnea  Compliant with CPAP    3. Post-gastric Bypass For Obesity  Continue current vitamin  supplements and monthly B12 shots    4. Eczema, unspecified type  Areas as described above.  She was given warnings on risk of infection and to seek medical attention immediately if signs of that.    She was warned to avoid getting the steroid in her eye.  She was warned about prolonged use of the steroid on her face.  She was told to stop using the Listerine and peroxide and other drying agents.  - triamcinolone (KENALOG) 0.1 % cream; Apply to affected areas 3 times a day for 2 weeks  Dispense: 45 g; Refill: 1    Osteopenia, plan to repeat DEXA scan in 1 to 3 years    3-year colonoscopy due April 2022    Yearly mammogram next April    Return in about 8 months (around 4/22/2020) for Annual physical.   Patient Instructions   Continue current medications.    Treat eczema spots on your face with the triamcinolone cream 3 times a day for 2 weeks.  Do not use the triamcinolone cream for longer than 2 weeks.    If it is not improving in 1 to 2 weeks, I would recommend returning to clinic for reevaluation.    If you develop significant increasing redness or swelling or pain, or especially of purulent drainage from the nose, seek medical attention right away to evaluate for possible infection.    Follow-up next April or May for physical exam.    Continue on your current vitamin D supplement.    Continue monthly vitamin B12 shots.    Suman Doshi MD      Current Outpatient Medications   Medication Sig Dispense Refill     aspirin 81 MG EC tablet Take 81 mg by mouth daily.       cholecalciferol, vitamin D3, 1,000 unit tablet Take 1,000 Units by mouth.       ferrous sulfate 325 (65 FE) MG tablet Take 325 mg by mouth.       hydroCHLOROthiazide (HYDRODIURIL) 25 MG tablet Take 1 tablet (25 mg total) by mouth daily.       losartan (COZAAR) 25 MG tablet Take 1 tablet (25 mg total) by mouth daily. 90 tablet 3     cholecalciferol, vitamin D3, 5,000 unit Tab Take by mouth.       triamcinolone (KENALOG) 0.1 % cream Apply to affected  areas 3 times a day for 2 weeks 45 g 1     Current Facility-Administered Medications   Medication Dose Route Frequency Provider Last Rate Last Dose     cyanocobalamin injection 1,000 mcg  1,000 mcg Intramuscular Q30 Days Suman Doshi MD   1,000 mcg at 08/22/19 1035     Allergies   Allergen Reactions     Other Allergy (See Comments)      Ointment Base External Ointment, 12/10/2007.  Action: RASH.; Ointment Base External Ointment, 12/10/2007.  Action: RASH.       Social History     Tobacco Use     Smoking status: Never Smoker     Smokeless tobacco: Never Used   Substance Use Topics     Alcohol use: Yes     Alcohol/week: 3.5 oz     Types: 7 Standard drinks or equivalent per week     Drug use: No

## 2021-06-01 ENCOUNTER — RECORDS - HEALTHEAST (OUTPATIENT)
Dept: ADMINISTRATIVE | Facility: CLINIC | Age: 66
End: 2021-06-01

## 2021-06-01 VITALS — BODY MASS INDEX: 37.29 KG/M2 | WEIGHT: 197.5 LBS | HEIGHT: 61 IN

## 2021-06-02 VITALS — BODY MASS INDEX: 36.57 KG/M2 | WEIGHT: 196.2 LBS

## 2021-06-02 VITALS — HEIGHT: 61 IN | WEIGHT: 190 LBS | BODY MASS INDEX: 35.87 KG/M2

## 2021-06-02 NOTE — PROGRESS NOTES
Patient has an appointment today for b-12 and needs new orders. Please advise, orders pended.  Thanks.

## 2021-06-03 VITALS — BODY MASS INDEX: 35.61 KG/M2 | WEIGHT: 190 LBS

## 2021-06-03 VITALS — BODY MASS INDEX: 35.76 KG/M2 | WEIGHT: 190.8 LBS

## 2021-06-03 NOTE — TELEPHONE ENCOUNTER
Refill Approved    Rx renewed per Medication Renewal Policy. Medication was last renewed on 6/28/19.    Mary Ellen Dhaliwal, [facility] Triage/Med Refill 11/16/2019     Requested Prescriptions   Pending Prescriptions Disp Refills     hydroCHLOROthiazide (HYDRODIURIL) 25 MG tablet [Pharmacy Med Name: HYDROCHLOROTHIAZIDE 25 MG TAB] 90 tablet 3     Sig: TAKE 1 TABLET BY MOUTH EVERY DAY       Diuretics/Combination Diuretics Refill Protocol  Passed - 11/15/2019  2:09 AM        Passed - Visit with PCP or prescribing provider visit in past 12 months     Last office visit with prescriber/PCP: 9/17/2018 Anurag Dewitt MD OR same dept: 8/22/2019 Suman Doshi MD OR same specialty: 8/22/2019 Suman Doshi MD  Last physical: Visit date not found Last MTM visit: Visit date not found   Next visit within 3 mo: Visit date not found  Next physical within 3 mo: Visit date not found  Prescriber OR PCP: Anurag Dewitt MD  Last diagnosis associated with med order: There are no diagnoses linked to this encounter.  If protocol passes may refill for 12 months if within 3 months of last provider visit (or a total of 15 months).             Passed - Serum Potassium in past 12 months      Lab Results   Component Value Date    Potassium 4.8 06/28/2019             Passed - Serum Sodium in past 12 months      Lab Results   Component Value Date    Sodium 134 (L) 06/28/2019             Passed - Blood pressure on file in past 12 months     BP Readings from Last 1 Encounters:   08/22/19 128/64             Passed - Serum Creatinine in past 12 months      Creatinine   Date Value Ref Range Status   06/28/2019 0.71 0.60 - 1.10 mg/dL Final

## 2021-06-04 VITALS
SYSTOLIC BLOOD PRESSURE: 136 MMHG | DIASTOLIC BLOOD PRESSURE: 78 MMHG | BODY MASS INDEX: 36.63 KG/M2 | HEART RATE: 76 BPM | WEIGHT: 194 LBS | HEIGHT: 61 IN

## 2021-06-07 NOTE — PROGRESS NOTES
Patient consents to receive outdoor care: Yes    Upon arrival, patient instructed to proceed to designated location, place vehicle in park, turn off, and remove keys     If we are unable to safely and ergonomically able to provide care- is the patient able to safely able to get out of car and transfer to a chair? Yes        Patient would like to receive their AVS via mail.

## 2021-06-08 NOTE — PROGRESS NOTES
Patient consents to receive outdoor care: Yes    Upon arrival, patient instructed to proceed to designated location, place vehicle in park, turn off, and remove keys     If we are unable to safely and ergonomically able to provide care- is the patient able to safely able to get out of car and transfer to a chair? Yes    NA    Patient would like to receive their AVS NA.

## 2021-06-10 NOTE — PROGRESS NOTES
Assessment and Plan:   Patient instructions:  No change in medications.  Contact me if blood pressure is running more than 135/85 on a regular basis.    If you have significant worsening swelling or leg pain, seek medical attention right away for an ultrasound to rule out a blood clot.    Consider a DEXA scan for bone density measurement next year.    Get a high-dose flu vaccine this fall.    Increased regular walking.  I would recommend 10 to 20 minutes of daily walking.    Avoid bread and simple carbohydrate and starchy type foods.    Your colonoscopy will be due April 2022.    See ophthalmology in the fall for routine eye check.    Follow-up in 1 year for physical exam.      1. Healthcare maintenance  Patient confirmed full CODE STATUS.    I stressed the importance of increasing regular walking exercise.  She is not been getting a regular walking routine.  She does not have a good place to walk, generally walks in the home.    Routine eye exam last fall without problems.    She does drink some alcohol in the evening, would recommend reducing alcohol.    I did review the mammogram from July 2020, negative.  She declined breast exam today.    Negative hepatitis C test in 2018.    2016 DEXA scan reviewed with a spine score of -1.0.  Femur score -1.4/-1.3 with moderate trabecular bone.  No fracture history.  Current coronavirus outbreak she wants to wait and repeat her DEXA scan next year.    Chronic lower extremity edema stable.  She does have a history of DVT over 10 years ago of the left leg.  She was given warnings to seek medical attention right away if significant worsening edema or leg pain.  Continue low-dose aspirin.  He denies epigastric pain or bleeding with the aspirin.    She will get a flu shot this fall.  Otherwise up-to-date on immunizations.    Status post hysterectomy but has ovaries.  No GYN symptom complaints.    Yearly eye exam routine in the fall.    2. Essential hypertension  Well-controlled  at home.  Running in the 120s to 130s over 60s to 70s.  No orthostasis.  Continue losartan 25 mg a day and HCTZ 25 mg a day.  Normal creatinine last year.    Not anticipating statin.  Her mother did have coronary disease.  Patient has never smoked.  No chest pain or cardiac event.    March 2019  and HDL 55 without medication.    She is status post gastric bypass.  - Lipid Cascade    3. Vitamin B12 deficiency (non anemic)  B12 shots monthly.  B12 shot given today.    4. Post-gastric Bypass For Obesity, also status post cholecystectomy  Chronic loose stools, taking a digestive enzyme supplement.  Stable stools.  No upper abdominal pain complaints.  Taking her iron tablet, vitamin D and vitamins.    Plan DEXA scan next year    5. Obstructive sleep apnea  Compliant with CPAP.  I did recommend goal for weight loss and reducing alcohol.    6. History of colonic polyps  3 polyps April 2019 with 3-year follow-up.  Bowels normal now    7. Vitamin D deficiency  Continue supplement  - Vitamin D, Total (25-Hydroxy)    8. Medication monitoring encounter    - Comprehensive Metabolic Panel  - HM2(CBC w/o Differential)    9. Routine general medical examination at a health care facility  History of nephrolithiasis without recurrence.  No urinary symptoms currently.    The patient's current medical problems were reviewed.    I have had an Advance Directives discussion with the patient.  The following health maintenance schedule was reviewed with the patient and provided in printed form in the after visit summary:   Health Maintenance   Topic Date Due     ZOSTER VACCINES (2 of 2) 08/01/2016     DXA SCAN  06/08/2018     MEDICARE ANNUAL WELLNESS VISIT  03/28/2020     FALL RISK ASSESSMENT  03/28/2020     INFLUENZA VACCINE RULE BASED (1) 08/01/2020     MAMMOGRAM  07/06/2021     TD 18+ HE  01/08/2023     LIPID  03/28/2024     ADVANCE CARE PLANNING  03/28/2024     COLORECTAL CANCER SCREENING  04/23/2029     HEPATITIS C SCREENING   "Completed     PNEUMOCOCCAL IMMUNIZATION 65+ LOW/MEDIUM RISK  Completed     HEPATITIS B VACCINES  Aged Out        Subjective:   Chief Complaint: Dandy Mejia is an 65 y.o. female here for an Annual Wellness visit.   HPI: 65-year-old female she is  and lives with .  Does not get regular exercise.  She is retired hairdresser.  She generally drinks 1 alcoholic drink in the evening.  No falls.  No significant musculoskeletal pain complaints currently.  She does see a chiropractor.    Compliant with CPAP.  No palpitations or history of arrhythmias.    Her lower extreme edema is stable.  She is not always eating a low-salt diet, however.    No new cough or fever.  No chest pain.    No new headaches.  No swallowing problems or mouth sores.    Review of Systems: Otherwise negative.  Denies daytime sleepiness issues.  Please see above.  The rest of the review of systems are negative for all systems.    Patient Care Team:  Suman Doshi MD as PCP - General (Internal Medicine)  Suman Doshi MD as Assigned PCP     Patient Active Problem List   Diagnosis     Obstructive sleep apnea     Thrombophlebitis Of The Left Deep Femoral Vein     Post-gastric Bypass For Obesity     Nephrolithiasis     Adenomatous colon polyp (02/2014)     Essential hypertension     No past medical history on file.   Past Surgical History:   Procedure Laterality Date     CARPAL TUNNEL RELEASE Right      CHOLECYSTECTOMY       GASTRIC BYPASS  1980     TONSILLECTOMY       TOTAL ABDOMINAL HYSTERECTOMY  1993     TUBAL LIGATION        Family History   Problem Relation Age of Onset     Heart failure Mother      Atrial fibrillation Mother      Macular degeneration Mother      Diabetes type II Father         Older-age onset of diabetes for multiple family members in father's side     Cancer Sister         \"Female cancer wih lymph node removal\"     Glaucoma Sister         Being watched for glaucoma     Depression Sister      Fibromyalgia Sister  "     Glaucoma Sister       Social History     Socioeconomic History     Marital status:      Spouse name: Not on file     Number of children: Not on file     Years of education: Not on file     Highest education level: Not on file   Occupational History     Occupation: Retired    Social Needs     Financial resource strain: Not on file     Food insecurity     Worry: Not on file     Inability: Not on file     Transportation needs     Medical: Not on file     Non-medical: Not on file   Tobacco Use     Smoking status: Never Smoker     Smokeless tobacco: Never Used   Substance and Sexual Activity     Alcohol use: Yes     Alcohol/week: 5.8 standard drinks     Types: 7 Standard drinks or equivalent per week     Drug use: No     Sexual activity: Never     Partners: Male   Lifestyle     Physical activity     Days per week: Not on file     Minutes per session: Not on file     Stress: Not on file   Relationships     Social connections     Talks on phone: Not on file     Gets together: Not on file     Attends Yarsani service: Not on file     Active member of club or organization: Not on file     Attends meetings of clubs or organizations: Not on file     Relationship status: Not on file     Intimate partner violence     Fear of current or ex partner: Not on file     Emotionally abused: Not on file     Physically abused: Not on file     Forced sexual activity: Not on file   Other Topics Concern     Not on file   Social History Narrative     Not on file      Current Outpatient Medications   Medication Sig Dispense Refill     aspirin 81 MG EC tablet Take 81 mg by mouth daily.       cholecalciferol, vitamin D3, 5,000 unit Tab Take 1 tablet by mouth daily.        ferrous sulfate 325 (65 FE) MG tablet Take 325 mg by mouth.       hydroCHLOROthiazide (HYDRODIURIL) 25 MG tablet Take 1 tablet (25 mg total) by mouth daily. 90 tablet 1     losartan (COZAAR) 25 MG tablet Take 1 tablet (25 mg total) by mouth daily. 90  "tablet 0     triamcinolone (KENALOG) 0.1 % cream Apply to affected areas 3 times a day for 2 weeks 45 g 1     Current Facility-Administered Medications   Medication Dose Route Frequency Provider Last Rate Last Dose     cyanocobalamin injection 1,000 mcg  1,000 mcg Intramuscular Q30 Days Suman Doshi MD   1,000 mcg at 08/20/20 0919      Objective:   Vital Signs:   Visit Vitals  /78   Pulse 76   Ht 5' 1.25\" (1.556 m)   Wt 194 lb (88 kg)   LMP 03/22/1993   BMI 36.36 kg/m           VisionScreening:  No exam data present     PHYSICAL EXAM  Moderately obese female.  Alert and oriented x3.  Good mood and affect.  Clock face drawing and cognitive screen normal.  Gait and mobility exam normal.    Pupils irises equal and reactive.  Extraocular muscles intact.  No jaundice or conjunctivitis.  External ears and nose exam is normal.  Patient kept mask on during exam.  No cervical or supraclavicular or axillary adenopathy.  No JVD and no carotid bruits.  No thyromegaly or nodularity.  Lungs clear to auscultation with normal respiratory excursion.  Heart is regular without murmur.  She does have trace ankle edema bilaterally.  Feet otherwise in good condition.  Abdomen is moderately obese but nontender no hepatosplenomegaly.  No pulsatile mass.  She declined breast exam.  Skin exam without suspicious lesions.    Assessment Results 8/20/2020   Activities of Daily Living No help needed   Instrumental Activities of Daily Living No help needed   Mini Cog Total Score 5   Some recent data might be hidden     A Mini-Cog score of 0-2 suggests the possibility of dementia, score of 3-5 suggests no dementia      Identified Health Risks:     She is at risk for lack of exercise and has been provided with information to increase physical activity for the benefit of her well-being.  Patient's advanced directive was discussed and I am comfortable with the patient's wishes.        "

## 2021-06-10 NOTE — TELEPHONE ENCOUNTER
RN cannot approve Refill Request    RN can NOT refill this medication Protocol failed and NO refill given. Last office visit: 8/22/2019 Suman Doshi MD Last Physical: 3/28/2019 Last MTM visit: Visit date not found Last visit same specialty: 8/22/2019 Suman Doshi MD.  Next visit within 3 mo: Visit date not found  Next physical within 3 mo: Visit date not found      Mary Ellen Dhaliwal, [facility] Triage/Med Refill 8/19/2020    Requested Prescriptions   Pending Prescriptions Disp Refills     hydroCHLOROthiazide (HYDRODIURIL) 25 MG tablet 90 tablet 2     Sig: Take 1 tablet (25 mg total) by mouth daily.       Diuretics/Combination Diuretics Refill Protocol  Failed - 8/19/2020  7:14 AM        Failed - Serum Potassium in past 12 months      No results found for: LN-POTASSIUM          Failed - Serum Sodium in past 12 months      No results found for: LN-SODIUM          Failed - Serum Creatinine in past 12 months      Creatinine   Date Value Ref Range Status   06/28/2019 0.71 0.60 - 1.10 mg/dL Final             Passed - Visit with PCP or prescribing provider visit in past 12 months     Last office visit with prescriber/PCP: 8/22/2019 Suman Doshi MD OR same dept: 8/22/2019 Suman Dosih MD OR same specialty: 8/22/2019 Suman Doshi MD  Last physical: 3/28/2019 Last MTM visit: Visit date not found   Next visit within 3 mo: Visit date not found  Next physical within 3 mo: Visit date not found  Prescriber OR PCP: Suman Doshi MD  Last diagnosis associated with med order: 1. Essential hypertension  - hydroCHLOROthiazide (HYDRODIURIL) 25 MG tablet; Take 1 tablet (25 mg total) by mouth daily.  Dispense: 90 tablet; Refill: 2    If protocol passes may refill for 12 months if within 3 months of last provider visit (or a total of 15 months).             Passed - Blood pressure on file in past 12 months     BP Readings from Last 1 Encounters:   08/22/19 128/64

## 2021-06-11 NOTE — TELEPHONE ENCOUNTER
Refill Approved    Rx renewed per Medication Renewal Policy. Medication was last renewed on 7/7/20.    Mary Ellen Dhaliwal, [facility] Triage/Med Refill 10/1/2020     Requested Prescriptions   Pending Prescriptions Disp Refills     losartan (COZAAR) 25 MG tablet [Pharmacy Med Name: LOSARTAN POTASSIUM 25 MG TAB] 90 tablet 0     Sig: TAKE 1 TABLET BY MOUTH EVERY DAY       Angiotensin Receptor Blocker Protocol Passed - 9/28/2020  8:08 AM        Passed - PCP or prescribing provider visit in past 12 months       Last office visit with prescriber/PCP: 8/22/2019 Suman Doshi MD OR same dept: Visit date not found OR same specialty: 8/22/2019 Suman Doshi MD  Last physical: 8/20/2020 Last MTM visit: Visit date not found   Next visit within 3 mo: Visit date not found  Next physical within 3 mo: Visit date not found  Prescriber OR PCP: Suman Doshi MD  Last diagnosis associated with med order: 1. Essential hypertension  - losartan (COZAAR) 25 MG tablet [Pharmacy Med Name: LOSARTAN POTASSIUM 25 MG TAB]; TAKE 1 TABLET BY MOUTH EVERY DAY  Dispense: 90 tablet; Refill: 0    If protocol passes may refill for 12 months if within 3 months of last provider visit (or a total of 15 months).             Passed - Serum potassium within the past 12 months     Lab Results   Component Value Date    Potassium 4.0 08/20/2020             Passed - Blood pressure filed in past 12 months     BP Readings from Last 1 Encounters:   08/20/20 136/78             Passed - Serum creatinine within the past 12 months     Creatinine   Date Value Ref Range Status   08/20/2020 0.75 0.60 - 1.10 mg/dL Final                            
Additional Progress Note...

## 2021-06-15 NOTE — TELEPHONE ENCOUNTER
Refill Approved    Rx renewed per Medication Renewal Policy. Medication was last renewed on 8/19/20.    Donald Lester, [facility] Triage/Med Refill 2/16/2021     Requested Prescriptions   Pending Prescriptions Disp Refills     hydroCHLOROthiazide (HYDRODIURIL) 25 MG tablet [Pharmacy Med Name: HYDROCHLOROTHIAZIDE 25 MG TAB] 90 tablet 1     Sig: TAKE 1 TABLET BY MOUTH EVERY DAY       Diuretics/Combination Diuretics Refill Protocol  Passed - 2/16/2021  8:18 AM        Passed - Visit with PCP or prescribing provider visit in past 12 months     Last office visit with prescriber/PCP: 8/22/2019 Suman Doshi MD OR same dept: Visit date not found OR same specialty: 8/22/2019 Suman Dohsi MD  Last physical: 8/20/2020 Last MTM visit: Visit date not found   Next visit within 3 mo: Visit date not found  Next physical within 3 mo: Visit date not found  Prescriber OR PCP: Suman Doshi MD  Last diagnosis associated with med order: 1. Essential hypertension  - hydroCHLOROthiazide (HYDRODIURIL) 25 MG tablet [Pharmacy Med Name: HYDROCHLOROTHIAZIDE 25 MG TAB]; TAKE 1 TABLET BY MOUTH EVERY DAY  Dispense: 90 tablet; Refill: 1    If protocol passes may refill for 12 months if within 3 months of last provider visit (or a total of 15 months).             Passed - Serum Potassium in past 12 months      Lab Results   Component Value Date    Potassium 4.0 08/20/2020             Passed - Serum Sodium in past 12 months      Lab Results   Component Value Date    Sodium 141 08/20/2020             Passed - Blood pressure on file in past 12 months     BP Readings from Last 1 Encounters:   08/20/20 136/78             Passed - Serum Creatinine in past 12 months      Creatinine   Date Value Ref Range Status   08/20/2020 0.75 0.60 - 1.10 mg/dL Final

## 2021-06-15 NOTE — PROGRESS NOTES
Chief Complaint   Patient presents with     B12 Injection     Pt states last received b12 at [facility] in [facility]. It has been over 30 days. Pt stopped getting B12 at [facility] last year due to insurance isuses, but now she's able to get it at [facility] again.  Last annual Px done at [facility] in [facility] around 9/2017. Pt was planning to do next Px with PCP around Sept   Pt is willing to do B12 test prior next Px if PCP recommended.     Ruthy Zuñiga, [facility] 1/23/2018 11:29 AM

## 2021-06-16 PROBLEM — I10 ESSENTIAL HYPERTENSION: Status: ACTIVE | Noted: 2017-08-02

## 2021-06-16 NOTE — PROGRESS NOTES
After obtaining consent, and per orders of Dr. Chappell, injection of B12 - 1000 mcg given by Leia Charles.

## 2021-06-16 NOTE — PROGRESS NOTES
Assessment and Plan:     1. Health care maintenance  Appears to be up-to-date.  Should have the pneumonia vaccination today.  - cyanocobalamin injection 1,000 mcg; Inject 1 mL (1,000 mcg total) into the shoulder, thigh, or buttocks every 30 (thirty) days.    2. Need for hepatitis C screening test  Needs screening once  - Hepatitis C Antibody (Anti-HCV)    3. Post-gastric Bypass For Obesity  Needs labs checked occasionally for her history of bypass.  She is taking B12.  I did not think we need to recheck that.  - Ferritin  - Iron and Transferrin Iron Binding Capacity  - Comprehensive Metabolic Panel  - HM2(CBC w/o Differential)    4. Nephrolithiasis  No recurrent issues.  - Comprehensive Metabolic Panel  - Urinalysis    5. Obstructive sleep apnea  She is using her CPAP on a regular basis    6. Thrombophlebitis of left femoral vein  This may be the causes of her left peripheral edema with incompetence of the valve.    7. Hyponatremia  This is secondary to her thiazide diuretic.  Her previous doctor told her inappropriately to increase sodium intake.  This is incorrect.  It will only exacerbate the edema.  She will continue to follow low-sodium diet.  Please see the recommendations made in the patient recommendations.  Her level is not low enough to be concerned enough to stop the thiazide diuretic.      The patient's current medical problems were reviewed.    The following health maintenance schedule was reviewed with the patient and provided in printed form in the after visit summary:   Health Maintenance   Topic Date Due     MAMMOGRAM  02/07/2018     PNEUMOCOCCAL POLYSACCHARIDE VACCINE AGE 65 AND OVER  03/29/2019 (Originally 1/28/2018)     DXA SCAN  06/08/2018     COLONOSCOPY  02/04/2019     FALL RISK ASSESSMENT  03/15/2019     ADVANCE DIRECTIVES DISCUSSED WITH PATIENT  06/06/2021     TD 18+ HE  01/08/2023     INFLUENZA VACCINE RULE BASED  Completed     TDAP ADULT ONE TIME DOSE  Completed     PNEUMOCOCCAL  CONJUGATE VACCINE FOR ADULTS (PCV13 OR PREVNAR)  Completed     ZOSTER VACCINE  Addressed        Subjective:   Chief Complaint: Dandy Mejia is an 63 y.o. female here for a Welcome to Medicare visit.   HPI:  She does not have any acute concerns today.     Hypertension: She was started on hydrochlorothiazide for swelling in her legs and her elevated blood pressure. She became hyponatremic after starting the medication. She was given some conflicting recommendations, but she continues to avoid salt in her diet. She continues to have a small amount of swelling in her lower extremities, but they are no longer painful. Her blood pressure is in a good range today.     S/P Gastric Bypass Surgery: She has undergone two gastric bypass surgeries in the past, but she is not happy with the results. She believes she had a Jennifer-en-y and stomach stapling. She will be screened for problems related to malabsorption today. She has been hyponatremic, takes a vitamin D supplement, and gets vitamin B12 injections.     Sleep Apnea: She is using her CPAP device regularly, which she tolerates well.     Health Maintenance: Her colonoscopy is up to date. She is due for her yearly mammogram. She will be screened for hepatitis C today. She got the Prevnar 13 vaccine today. She will look into getting the Shingrix vaccine.     Review of Systems:    She is taking a vitamin D supplement. She has been getting vitamin B12 injections. She denies chest pain, pressure, or tightness in the chest.   Please see above.  The rest of the review of systems are negative for all systems.    PFSH:  She does not exercise on a regular basis, but she keeps moving. She is S/P two gastric bypass surgeries. She thinks she had jennifer-en-y and stomach stapling. She is S/P cholecystectomy. She has had a good winter. Her sister had cancer. There is a lot of diabetes in her father's side of the family.     Patient Care Team:  Barrera Chappell MD as PCP - General  "    Patient Active Problem List   Diagnosis     Obstructive sleep apnea     Thrombophlebitis Of The Left Deep Femoral Vein     Post-gastric Bypass For Obesity     Nephrolithiasis     Adenomatous colon polyp (02/2014)     History reviewed. No pertinent past medical history.   Past Surgical History:   Procedure Laterality Date     CARPAL TUNNEL RELEASE Right      CHOLECYSTECTOMY       GASTRIC BYPASS  1980     TONSILLECTOMY       TOTAL ABDOMINAL HYSTERECTOMY       TUBAL LIGATION        Family History   Problem Relation Age of Onset     Depression Other      Diabetes type II Other      Fibromyalgia Other      Macular degeneration Other       Social History     Social History     Marital status: Single     Spouse name: N/A     Number of children: N/A     Years of education: N/A     Occupational History     Not on file.     Social History Main Topics     Smoking status: Never Smoker     Smokeless tobacco: Never Used     Alcohol use 3.5 oz/week     7 Standard drinks or equivalent per week     Drug use: No     Sexual activity: No     Other Topics Concern     Not on file     Social History Narrative       Current Outpatient Prescriptions   Medication Sig Dispense Refill     aspirin 81 MG EC tablet Take 81 mg by mouth daily.       hydroCHLOROthiazide (HYDRODIURIL) 12.5 MG tablet Take 12.5 mg by mouth daily.       Current Facility-Administered Medications   Medication Dose Route Frequency Provider Last Rate Last Dose     cyanocobalamin injection 1,000 mcg  1,000 mcg Intramuscular Q30 Days Barrera Chappell MD   1,000 mcg at 02/22/18 1114     [START ON 4/2/2018] cyanocobalamin injection 1,000 mcg  1,000 mcg Intramuscular Q30 Days Barrera Chappell MD          Objective:   Vital Signs:   Visit Vitals     /64     Pulse 63     Ht 5' 1.42\" (1.56 m)     Wt 197 lb 8 oz (89.6 kg)     Breastfeeding No     BMI 36.81 kg/m2        VisionScreening:   Visual Acuity Screening    Right eye Left eye Both eyes   Without correction:    "   With correction: 10/20 10/20 10/16        PHYSICAL EXAM  General Appearance: Alert, cooperative, no distress, appears stated age.  HEENT: EMOI, fundi not observed, cyst right lower lid, TMs normal, mouth and throat without lesions.  Neck: Supple without adenopathy or thyromegaly.  Back: No CVA tenderness or spinous process pain.  Lungs: Clear to auscultation bilaterally, good air movement.  Heart: Regular rate and rhythm, S1 and S2 normal, no murmur or bruit.  Abdomen: Soft, obese, non-tender, no HSM or masses. Abdominal scar from bariatric surgery  Breast: Normal fatty and fibrocystic changes.   GYN: Not done.    Musculoskeletal: No gross abnormalities.  Extremities: 2+ edema in lower extremities a little worse on the left than the right, pulses II/IV and symmetric,   Skin: No worrisome lesions noted.  Lymph nodes: Cervical, supraclavicular, groin, and axillary nodes normal.  Neurologic: CNII-XII intact, strength V/V and symmetric, DTRs II/IV and symmetric, sensory grossly intact  Psychiatric:  She has a normal mood and affect.    Assessment Results 3/15/2018   Activities of Daily Living No help needed   Instrumental Activities of Daily Living No help needed   Mini Cog Total Score 4   Some recent data might be hidden     A Mini Cog score of 0-2 suggests the possibility of dementia, score of 3-5 suggests no dementia    Identified Health Risks:     She is at risk for lack of exercise and has been provided with information to increase physical activity for the benefit of her well-being.  Patient's advanced directive was discussed and I am comfortable with the patient's wishes.    ADDITIONAL HISTORY SUMMARIZED (2): None.  DECISION TO OBTAIN EXTRA INFORMATION (1): Care Everywhere accessed.   RADIOLOGY TESTS (1): Reviewed June 2016 DXA - low T score -1.4 in left femoral neck.   LABS (1): Labs from 8/28/2017 and 9/13/2017 from [facility] reviewed. Labs ordered.   MEDICINE TESTS (1): None.  INDEPENDENT REVIEW (2  each): None.     The visit lasted a total of 17 minutes face to face with the patient. Over 50% of the time was spent counseling and educating the patient about general health maintenance.    I, Jaime Rangel, am scribing for and in the presence of, Dr. Chappell.    I, Dr. Chappell, personally performed the services described in this documentation, as scribed by Jaime Rangel in my presence, and it is both accurate and complete.    Dragon dictation was used for this note.  Speech recognition errors are a possibility.    Total data points: 3

## 2021-06-17 NOTE — PATIENT INSTRUCTIONS - HE
Patient Instructions by Suman Doshi MD at 3/28/2019  9:20 AM     Author: Suman Doshi MD Service: -- Author Type: Physician    Filed: 3/28/2019 10:05 AM Encounter Date: 3/28/2019 Status: Addendum    : Suman Doshi MD (Physician)    Related Notes: Original Note by Suman Doshi MD (Physician) filed at 3/28/2019 10:04 AM       Walk 10 minutes a day in your house.  Daily walking can reduce blood pressure and reduce pain and achiness of the muscles.    Check blood pressure and write down numbers.  Bring list of blood pressures and your blood pressure cuff to clinic in approximately 2 months to reevaluate blood pressure.    Maintain a low-salt diet.    Goal blood pressure less than 135/85.    Continue on current hydrochlorothiazide at this time.    [facility] will contact you for your colonoscopy.    Proceed with mammogram on April 5 as planned.    Pneumovax 23 vaccine given today.  If you get a sore shoulder, you can use Tylenol and ice as needed.  Redness and pain generally last 2-3 days, if you get it.  Patient Education     Exercise for a Healthier Heart  You may wonder how you can improve the health of your heart. If youre thinking about exercise, youre on the right track. You dont need to become an athlete, but you do need a certain amount of brisk exercise to help strengthen your heart. If you have been diagnosed with a heart condition, your doctor may recommend exercise to help stabilize your condition. To help make exercise a habit, choose safe, fun activities.       Be sure to check with your health care provider before starting an exercise program.    Why exercise?  Exercising regularly offers many healthy rewards. It can help you do all of the following:    Improve your blood cholesterol levels to help prevent further heart trouble    Lower your blood pressure to help prevent a stroke or heart attack    Control diabetes, or reduce your risk of getting this disease    Improve  your heart and lung function    Reach and maintain a healthy weight    Make your muscles stronger and more limber so you can stay active    Prevent falls and fractures by slowing the loss of bone mass (osteoporosis)    Manage stress better  Exercise tips  Ease into your routine. Set small goals. Then build on them.  Exercise on most days. Aim for a total of 150 or more minutes of moderate to  vigorous intensity activity each week. Consider 40 minutes, 3 to 4 times a week. For best results, activity should last for 40 minutes on average. It is OK to work up to the 40 minute period over time. Examples of moderate-intensity activity is walking one mile in 15 minutes or 30 to 45 minutes of yard work.  Step up your daily activity level. Along with your exercise program, try being more active throughout the day. Walk instead of drive. Do more household tasks or yard work.  Choose one or more activities you enjoy. Walking is one of the easiest things you can do. You can also try swimming, riding a bike, or taking an exercise class.  Stop exercising and call your doctor if you:    Have chest pain or feel dizzy or lightheaded    Feel burning, tightness, pressure, or heaviness in your chest, neck, shoulders, back, or arms    Have unusual shortness of breath    Have increased joint or muscle pain    Have palpitations or an irregular heartbeat      9546-9161 [employer]. [address] [place] [address], [place] [address]. All rights reserved. This information is not intended as a substitute for professional medical care. Always follow your healthcare professional's instructions.           Advance Directive  Patients advance directive was discussed and I am comfortable with the patients wishes.  Patient Education   Personalized Prevention Plan  You are due for the preventive services outlined below.  Your care team is available to assist you in scheduling these services.  If you have already completed any of these items,  please share that information with your care team to update in your medical record.  Health Maintenance   Topic Date Due   ? ZOSTER VACCINES (2 of 2) 08/01/2016   ? DXA SCAN  06/08/2018   ? COLONOSCOPY  02/04/2019   ? MAMMOGRAM  03/22/2019   ? PNEUMOCOCCAL POLYSACCHARIDE VACCINE AGE 65 AND OVER  03/29/2019 (Originally 1/28/2018)   ? FALL RISK ASSESSMENT  09/17/2019   ? TD 18+ HE  01/08/2023   ? ADVANCE DIRECTIVES DISCUSSED WITH PATIENT  03/15/2023   ? INFLUENZA VACCINE RULE BASED  Completed   ? PNEUMOCOCCAL CONJUGATE VACCINE FOR ADULTS (PCV13 OR PREVNAR)  Completed

## 2021-06-18 NOTE — PATIENT INSTRUCTIONS - HE
Patient Instructions by Suman Doshi MD at 8/20/2020  9:00 AM     Author: Suman Doshi MD Service: -- Author Type: Physician    Filed: 8/20/2020  9:36 AM Encounter Date: 8/20/2020 Status: Addendum    : Suman Doshi MD (Physician)    Related Notes: Original Note by Suman Doshi MD (Physician) filed at 8/20/2020  9:35 AM       No change in medications.  Contact me if blood pressure is running more than 135/85 on a regular basis.    If you have significant worsening swelling or leg pain, seek medical attention right away for an ultrasound to rule out a blood clot.    Consider a DEXA scan for bone density measurement next year.    Get a high-dose flu vaccine this fall.    Increased regular walking.  I would recommend 10 to 20 minutes of daily walking.    Avoid bread and simple carbohydrate and starchy type foods.    Your colonoscopy will be due April 2022.    See ophthalmology in the fall for routine eye check.    Follow-up in 1 year for physical exam.  Patient Education     Exercise for a Healthier Heart  You may wonder how you can improve the health of your heart. If youre thinking about exercise, youre on the right track. You dont need to become an athlete, but you do need a certain amount of brisk exercise to help strengthen your heart. If you have been diagnosed with a heart condition, your doctor may recommend exercise to help stabilize your condition. To help make exercise a habit, choose safe, fun activities.       Be sure to check with your health care provider before starting an exercise program.    Why exercise?  Exercising regularly offers many healthy rewards. It can help you do all of the following:    Improve your blood cholesterol levels to help prevent further heart trouble    Lower your blood pressure to help prevent a stroke or heart attack    Control diabetes, or reduce your risk of getting this disease    Improve your heart and lung function    Reach and maintain a healthy  weight    Make your muscles stronger and more limber so you can stay active    Prevent falls and fractures by slowing the loss of bone mass (osteoporosis)    Manage stress better  Exercise tips  Ease into your routine. Set small goals. Then build on them.  Exercise on most days. Aim for a total of 150 or more minutes of moderate to  vigorous intensity activity each week. Consider 40 minutes, 3 to 4 times a week. For best results, activity should last for 40 minutes on average. It is OK to work up to the 40 minute period over time. Examples of moderate-intensity activity is walking one mile in 15 minutes or 30 to 45 minutes of yard work.  Step up your daily activity level. Along with your exercise program, try being more active throughout the day. Walk instead of drive. Do more household tasks or yard work.  Choose one or more activities you enjoy. Walking is one of the easiest things you can do. You can also try swimming, riding a bike, or taking an exercise class.  Stop exercising and call your doctor if you:    Have chest pain or feel dizzy or lightheaded    Feel burning, tightness, pressure, or heaviness in your chest, neck, shoulders, back, or arms    Have unusual shortness of breath    Have increased joint or muscle pain    Have palpitations or an irregular heartbeat      [id number] [employer]. [address] [place] [address], [place] [address]. All rights reserved. This information is not intended as a substitute for professional medical care. Always follow your healthcare professional's instructions.           Advance Directive  Patients advance directive was discussed and I am comfortable with the patients wishes.  Patient Education   Personalized Prevention Plan  You are due for the preventive services outlined below.  Your care team is available to assist you in scheduling these services.  If you have already completed any of these items, please share that information with your care team to update in  your medical record.  Health Maintenance   Topic Date Due   ? ZOSTER VACCINES (2 of 2) 08/01/2016   ? DXA SCAN  06/08/2018   ? MEDICARE ANNUAL WELLNESS VISIT  03/28/2020   ? FALL RISK ASSESSMENT  03/28/2020   ? INFLUENZA VACCINE RULE BASED (1) 08/01/2020   ? MAMMOGRAM  07/06/2021   ? TD 18+ HE  01/08/2023   ? LIPID  03/28/2024   ? ADVANCE CARE PLANNING  03/28/2024   ? COLORECTAL CANCER SCREENING  04/23/2029   ? HEPATITIS C SCREENING  Completed   ? PNEUMOCOCCAL IMMUNIZATION 65+ LOW/MEDIUM RISK  Completed   ? HEPATITIS B VACCINES  Aged Out

## 2021-06-19 NOTE — LETTER
Letter by Suman Doshi MD at      Author: Suman Doshi MD Service: -- Author Type: --    Filed:  Encounter Date: 3/29/2019 Status: (Other)         Dandy Mejia  [address] [place] [id number]             March 29, 2019         Dear Ms. Mejia,    Below are the results from your recent visit:    Resulted Orders   Comprehensive Metabolic Panel   Result Value Ref Range    Sodium 136 136 - 145 mmol/L    Potassium 3.7 3.5 - 5.0 mmol/L    Chloride 97 (L) 98 - 107 mmol/L    CO2 28 22 - 31 mmol/L    Anion Gap, Calculation 11 5 - 18 mmol/L    Glucose 95 70 - 125 mg/dL    BUN 11 8 - 22 mg/dL    Creatinine 0.73 0.60 - 1.10 mg/dL    GFR MDRD Af Amer >60 >60 mL/min/1.73m2    GFR MDRD Non Af Amer >60 >60 mL/min/1.73m2    Bilirubin, Total 0.4 0.0 - 1.0 mg/dL    Calcium 9.3 8.5 - 10.5 mg/dL    Protein, Total 5.7 (L) 6.0 - 8.0 g/dL    Albumin 3.6 3.5 - 5.0 g/dL    Alkaline Phosphatase 43 (L) 45 - 120 U/L    AST 17 0 - 40 U/L    ALT 16 0 - 45 U/L    Narrative    Fasting Glucose reference range is 70-99 mg/dL per  American Diabetes Association (ADA) guidelines.   HM2(CBC w/o Differential)   Result Value Ref Range    WBC 5.6 4.0 - 11.0 thou/uL    RBC 4.10 3.80 - 5.40 mill/uL    Hemoglobin 13.0 12.0 - 16.0 g/dL    Hematocrit 39.3 35.0 - 47.0 %    MCV 96 80 - 100 fL    MCH 31.8 27.0 - 34.0 pg    MCHC 33.1 32.0 - 36.0 g/dL    RDW 11.4 11.0 - 14.5 %    Platelets 328 140 - 440 thou/uL    MPV 8.1 7.0 - 10.0 fL   Lipid Cascade   Result Value Ref Range    Cholesterol 187 <=199 mg/dL    Triglycerides 133 <=149 mg/dL    HDL Cholesterol 55 >=50 mg/dL    LDL Calculated 105 <=129 mg/dL    Patient Fasting > 8hrs? Yes    Vitamin D, Total (25-Hydroxy)   Result Value Ref Range    Vitamin D, Total (25-Hydroxy) 30.1 30.0 - 80.0 ng/mL    Narrative    Deficiency <10.0 ng/mL  Insufficiency 10.0-29.9 ng/mL  Sufficiency 30.0-80.0 ng/mL  Toxicity (possible) >100.0 ng/mL       Kidney and liver tests are normal.  Normal blood sugar.    Hemoglobin  and blood counts are normal.      Excellent cholesterol levels.    Vitamin D level OK, continue on current vitamin D supplement.      No change in treatment plan.     Please call with questions or contact us using MyChart.    Sincerely,        Electronically signed by Suman Doshi MD

## 2021-06-20 NOTE — PROGRESS NOTES
1. Essential hypertension          Medications Ordered   Medications     hydroCHLOROthiazide (HYDRODIURIL) 25 MG tablet     Sig: Take 1 tablet (25 mg total) by mouth daily.     Dispense:  90 tablet     Refill:  3        Plan: I did refill her hydrochlorothiazide for 25 mg daily she seems to be doing very well with this, and I do not think there is any need to do labs today.  She is set up for a and gave her 3 refills with 90 day supply each.  Physical coming up here in a few months.  She will let us know if she has any problems.    Subjective: 63-year-old female who is a former patient of Dr. Sullivans.  She is here today to get a refill of her hydrochlorothiazide.  She has been on this medication for several years.  It is a nice job of keeping her blood pressure at a good level, and also helps to alleviate some of the swelling she gets her ankles at times.  She states that sometimes even now, she will get some fluid in her lower extremities when she has been up on them all day but they seem to be better in the morning.  She denies any shortness of breath or chest pain.  She denies any side effects of the medication.    Objective: Well-appearing female in no acute distress.  Vital signs as noted.  Blood pressure is good today.  Chest clear to auscultation.  Heart regular rate and rhythm.  Extremities show trace edema at the ankle area.

## 2021-06-21 ENCOUNTER — RECORDS - HEALTHEAST (OUTPATIENT)
Dept: INTERNAL MEDICINE | Facility: CLINIC | Age: 66
End: 2021-06-21

## 2021-06-21 DIAGNOSIS — Z12.31 VISIT FOR SCREENING MAMMOGRAM: ICD-10-CM

## 2021-06-21 NOTE — PROGRESS NOTES
B12 injection was given today and tolerated well. Patient is scheduled for next injection.    Patient is 1 day late for injection. Per Dr. Feliciano Song, it is ok to give today.

## 2021-06-27 ENCOUNTER — HEALTH MAINTENANCE LETTER (OUTPATIENT)
Age: 66
End: 2021-06-27

## 2021-06-28 ENCOUNTER — AMBULATORY - HEALTHEAST (OUTPATIENT)
Dept: NURSING | Facility: CLINIC | Age: 66
End: 2021-06-28

## 2021-07-01 ENCOUNTER — TRANSCRIBE ORDERS (OUTPATIENT)
Dept: INTERNAL MEDICINE | Facility: CLINIC | Age: 66
End: 2021-07-01

## 2021-07-01 DIAGNOSIS — Z98.84 STATUS POST GASTRIC BYPASS FOR OBESITY: Primary | ICD-10-CM

## 2021-07-01 RX ORDER — CYANOCOBALAMIN 1000 UG/ML
1000 INJECTION, SOLUTION INTRAMUSCULAR; SUBCUTANEOUS
Status: ACTIVE | OUTPATIENT
Start: 2020-10-20 | End: 2021-10-14

## 2021-07-01 NOTE — PROGRESS NOTES
As part of the required manual data conversion process for integration, this encounter was created to document a CAM (Clinic Administered Medication) order. This information was copied from the [facility] patient's chart to the [facility] patient chart.     Mai Lowry, Deb  July 1, 2021

## 2021-07-26 ENCOUNTER — ALLIED HEALTH/NURSE VISIT (OUTPATIENT)
Dept: FAMILY MEDICINE | Facility: CLINIC | Age: 66
End: 2021-07-26
Payer: COMMERCIAL

## 2021-07-26 DIAGNOSIS — Z23 NEED FOR VACCINATION: Primary | ICD-10-CM

## 2021-07-26 PROCEDURE — 96372 THER/PROPH/DIAG INJ SC/IM: CPT | Performed by: PHARMACIST

## 2021-07-26 PROCEDURE — 99207 PR NO CHARGE NURSE ONLY: CPT

## 2021-07-26 RX ADMIN — CYANOCOBALAMIN 1000 MCG: 1000 INJECTION, SOLUTION INTRAMUSCULAR; SUBCUTANEOUS at 10:27

## 2021-08-10 ENCOUNTER — HOSPITAL ENCOUNTER (OUTPATIENT)
Dept: MAMMOGRAPHY | Facility: CLINIC | Age: 66
Discharge: HOME | End: 2021-08-10
Attending: INTERNAL MEDICINE | Admitting: INTERNAL MEDICINE
Payer: COMMERCIAL

## 2021-08-10 DIAGNOSIS — Z12.31 VISIT FOR SCREENING MAMMOGRAM: ICD-10-CM

## 2021-08-10 PROCEDURE — 77063 BREAST TOMOSYNTHESIS BI: CPT

## 2021-08-23 ENCOUNTER — ALLIED HEALTH/NURSE VISIT (OUTPATIENT)
Dept: FAMILY MEDICINE | Facility: CLINIC | Age: 66
End: 2021-08-23
Payer: COMMERCIAL

## 2021-08-23 DIAGNOSIS — Z23 NEED FOR VACCINATION: Primary | ICD-10-CM

## 2021-08-23 PROCEDURE — 99207 PR NO CHARGE NURSE ONLY: CPT

## 2021-08-23 PROCEDURE — 96372 THER/PROPH/DIAG INJ SC/IM: CPT | Performed by: PHARMACIST

## 2021-08-23 RX ADMIN — CYANOCOBALAMIN 1000 MCG: 1000 INJECTION, SOLUTION INTRAMUSCULAR; SUBCUTANEOUS at 09:35

## 2021-09-23 ENCOUNTER — ALLIED HEALTH/NURSE VISIT (OUTPATIENT)
Dept: FAMILY MEDICINE | Facility: CLINIC | Age: 66
End: 2021-09-23
Payer: COMMERCIAL

## 2021-09-23 DIAGNOSIS — Z23 NEED FOR VACCINATION: Primary | ICD-10-CM

## 2021-09-23 PROCEDURE — 96372 THER/PROPH/DIAG INJ SC/IM: CPT | Performed by: PHARMACIST

## 2021-09-23 PROCEDURE — 99207 PR NO CHARGE NURSE ONLY: CPT

## 2021-09-23 RX ADMIN — CYANOCOBALAMIN 1000 MCG: 1000 INJECTION, SOLUTION INTRAMUSCULAR; SUBCUTANEOUS at 11:01

## 2021-10-17 ENCOUNTER — HEALTH MAINTENANCE LETTER (OUTPATIENT)
Age: 66
End: 2021-10-17

## 2021-10-18 DIAGNOSIS — E53.8 VITAMIN B12 DEFICIENCY (NON ANEMIC): Primary | ICD-10-CM

## 2021-10-19 RX ORDER — CYANOCOBALAMIN 1000 UG/ML
1000 INJECTION, SOLUTION INTRAMUSCULAR; SUBCUTANEOUS
Status: ACTIVE | OUTPATIENT
Start: 2021-10-19

## 2021-10-21 ENCOUNTER — OFFICE VISIT (OUTPATIENT)
Dept: INTERNAL MEDICINE | Facility: CLINIC | Age: 66
End: 2021-10-21
Payer: COMMERCIAL

## 2021-10-21 ENCOUNTER — ALLIED HEALTH/NURSE VISIT (OUTPATIENT)
Dept: FAMILY MEDICINE | Facility: CLINIC | Age: 66
End: 2021-10-21
Payer: COMMERCIAL

## 2021-10-21 VITALS
HEART RATE: 64 BPM | BODY MASS INDEX: 36.89 KG/M2 | SYSTOLIC BLOOD PRESSURE: 132 MMHG | OXYGEN SATURATION: 97 % | WEIGHT: 195.25 LBS | DIASTOLIC BLOOD PRESSURE: 80 MMHG

## 2021-10-21 DIAGNOSIS — I10 ESSENTIAL HYPERTENSION: Primary | ICD-10-CM

## 2021-10-21 DIAGNOSIS — Z23 NEED FOR IMMUNIZATION AGAINST INFLUENZA: ICD-10-CM

## 2021-10-21 DIAGNOSIS — Z23 NEED FOR VACCINATION: Primary | ICD-10-CM

## 2021-10-21 DIAGNOSIS — Z51.81 ENCOUNTER FOR THERAPEUTIC DRUG MONITORING: ICD-10-CM

## 2021-10-21 LAB
ANION GAP SERPL CALCULATED.3IONS-SCNC: 10 MMOL/L (ref 5–18)
BUN SERPL-MCNC: 8 MG/DL (ref 8–22)
CALCIUM SERPL-MCNC: 9.2 MG/DL (ref 8.5–10.5)
CHLORIDE BLD-SCNC: 97 MMOL/L (ref 98–107)
CO2 SERPL-SCNC: 27 MMOL/L (ref 22–31)
CREAT SERPL-MCNC: 0.72 MG/DL (ref 0.6–1.1)
GFR SERPL CREATININE-BSD FRML MDRD: 86 ML/MIN/1.73M2
GLUCOSE BLD-MCNC: 105 MG/DL (ref 70–125)
POTASSIUM BLD-SCNC: ABNORMAL MMOL/L
SODIUM SERPL-SCNC: 134 MMOL/L (ref 136–145)

## 2021-10-21 PROCEDURE — 82310 ASSAY OF CALCIUM: CPT | Performed by: INTERNAL MEDICINE

## 2021-10-21 PROCEDURE — 96372 THER/PROPH/DIAG INJ SC/IM: CPT | Performed by: INTERNAL MEDICINE

## 2021-10-21 PROCEDURE — 82435 ASSAY OF BLOOD CHLORIDE: CPT | Performed by: INTERNAL MEDICINE

## 2021-10-21 PROCEDURE — 82565 ASSAY OF CREATININE: CPT | Performed by: INTERNAL MEDICINE

## 2021-10-21 PROCEDURE — 99213 OFFICE O/P EST LOW 20 MIN: CPT | Mod: 25 | Performed by: INTERNAL MEDICINE

## 2021-10-21 PROCEDURE — 99207 PR NO CHARGE NURSE ONLY: CPT

## 2021-10-21 PROCEDURE — 90662 IIV NO PRSV INCREASED AG IM: CPT | Performed by: INTERNAL MEDICINE

## 2021-10-21 PROCEDURE — 84520 ASSAY OF UREA NITROGEN: CPT | Performed by: INTERNAL MEDICINE

## 2021-10-21 PROCEDURE — 84295 ASSAY OF SERUM SODIUM: CPT | Performed by: INTERNAL MEDICINE

## 2021-10-21 PROCEDURE — 82374 ASSAY BLOOD CARBON DIOXIDE: CPT | Performed by: INTERNAL MEDICINE

## 2021-10-21 PROCEDURE — 82947 ASSAY GLUCOSE BLOOD QUANT: CPT | Performed by: INTERNAL MEDICINE

## 2021-10-21 PROCEDURE — G0008 ADMIN INFLUENZA VIRUS VAC: HCPCS | Performed by: INTERNAL MEDICINE

## 2021-10-21 PROCEDURE — 36415 COLL VENOUS BLD VENIPUNCTURE: CPT | Performed by: INTERNAL MEDICINE

## 2021-10-21 RX ORDER — CHLORAL HYDRATE 500 MG
2 CAPSULE ORAL DAILY
COMMUNITY
End: 2024-06-03

## 2021-10-21 RX ADMIN — CYANOCOBALAMIN 1000 MCG: 1000 INJECTION, SOLUTION INTRAMUSCULAR; SUBCUTANEOUS at 11:02

## 2021-10-21 NOTE — PATIENT INSTRUCTIONS
Continue on current medications.  Check your [employer] computer portal for results of today's kidney labs checked.    Goal blood pressure less than 135/85, but not less than 110/60 especially if lightheaded or dizzy.    Get your  Moderna COVID-19 vaccine booster as soon as possible, it should be available next week.    Walk or move your body with stretching with 20 minutes of exercise on a daily basis.

## 2021-10-21 NOTE — PROGRESS NOTES
[facility] clinic Follow Up Note    Dandy Mejia   66 year old female    Date of Visit: 10/21/2021    Chief Complaint   Patient presents with     RECHECK     Subjective  Dandy is here with her  for blood pressure checkup.  She had run out of hydrochlorothiazide for a couple of days and had an elevated blood pressure in August.  I also increased her losartan to 25 mg twice daily and back on HCTZ 25 mg a day.    Her blood pressures been running in the 130s to 120s over 60s.  She did have 1 lower blood pressure 107/57 but stated she felt fine and did not feel lightheaded dizzy.  She not had any dizziness issues.    She has continued trace edema, stable.  No increasing shortness of breath.    She feels her CPAP machine is working well with her history of obstructive sleep apnea.    She has not started a daily walking or exercise schedule.    She is on low-dose aspirin but no bleeding issues.  But she had a left DVT over 10 years ago and does have a reason to be on that.    History of gastric bypass gets B12 shots monthly and had that today.  On her vitamin supplements.    She does have an eye appointment scheduled for next month for routine eye exam.    PMHx:  No past medical history on file.  PSHx:    Past Surgical History:   Procedure Laterality Date     CHOLECYSTECTOMY       GASTRIC BYPASS  1980     HYSTERECTOMY TOTAL ABDOMINAL  1993     RELEASE CARPAL TUNNEL Right      TONSILLECTOMY       TUBAL LIGATION       Immunizations:   Immunization History   Administered Date(s) Administered     COVID-19,PF,Moderna 03/08/2021, 04/05/2021     DT (PEDS <7y) 02/05/2004     FLU 6-35 months 10/22/2010, 10/27/2011, 11/14/2012     Flu, Unspecified 12/10/2007, 11/11/2008, 03/15/2018     Influenza (H1N1) 01/13/2010     Influenza (High Dose) 3 valent vaccine 10/29/2018     Influenza Vaccine IM > 6 months Valent IIV4 (Alfuria,Fluzone) 10/25/2019     Influenza Vaccine, 6+MO IM (QUADRIVALENT W/PRESERVATIVES) 10/23/2014,  10/27/2015, 10/25/2016, 10/16/2017     Influenza, Quad, High Dose, Pf, 65yr+ (Fluzone HD) 09/21/2020, 10/21/2021     Pneumo Conj 13-V (2010&after) 03/15/2018     Pneumococcal 23 valent 03/28/2019     Td,adult,historic,unspecified 02/05/2004, 01/08/2013     Tdap (Adacel,Boostrix) 01/08/2013       ROS A comprehensive review of systems was performed and was otherwise negative    Medications, allergies, and problem list were reviewed and updated    Exam  /80 (BP Location: Right arm, Patient Position: Sitting, Cuff Size: Adult Regular)   Pulse 64   Wt 88.6 kg (195 lb 4 oz)   SpO2 97%   BMI 36.89 kg/m    Heart regular without murmur.  Trace ankle edema but somewhat more on the left, baseline.    Assessment/Plan  1. Essential hypertension  Controlled on losartan twice daily 25 mg and HCTZ 25 mg a day.  Edema stable.    Underlying sleep apnea but compliant with CPAP.    See patient instructions for monitoring blood pressure.    She did bring in her wrist blood pressure cuff.  It was 140/80 on her check in 140/82 on my check.    Initial blood pressure was 132/80 and her home numbers are well controlled, does appear her cough is correlating and she will follow her pressure at home.  Follow-up in the spring for a blood pressure checkup appointment, then physical exam due after August 20.    2. Encounter for therapeutic drug monitoring    - Basic metabolic panel    3. Need for immunization against influenza    - INFLUENZA, QUAD, HIGH DOSE, PF, 65YR + (FLUZONE HD)      Return in about 5 months (around 3/21/2022) for Follow up.   Patient Instructions   Continue on current medications.  Check your [employer] computer portal for results of today's kidney labs checked.    Goal blood pressure less than 135/85, but not less than 110/60 especially if lightheaded or dizzy.    Get your  Moderna COVID-19 vaccine booster as soon as possible, it should be available next week.    Walk or move your body with stretching with 20 minutes  of exercise on a daily basis.    Suman Doshi MD, MD        Current Outpatient Medications   Medication Sig Dispense Refill     aspirin 81 MG EC tablet [ASPIRIN 81 MG EC TABLET] Take 81 mg by mouth daily.       Calcium Carbonate-Vit D-Min (CALCIUM 1200 PO)        cholecalciferol, vitamin D3, 5,000 unit Tab [CHOLECALCIFEROL, VITAMIN D3, 5,000 UNIT TAB] Take 1 tablet by mouth daily.        ferrous sulfate 325 (65 FE) MG tablet [FERROUS SULFATE 325 (65 FE) MG TABLET] Take 325 mg by mouth.       fish oil-omega-3 fatty acids 1000 MG capsule Take 2 g by mouth daily       hydrochlorothiazide (HYDRODIURIL) 25 MG tablet Take 1 tablet (25 mg) by mouth daily 90 tablet 3     losartan (COZAAR) 25 MG tablet Take 1 tablet (25 mg) by mouth 2 times daily 180 tablet 3     Probiotic Product (DAILY DIGESTIVE PROBIOTIC PO)        triamcinolone (KENALOG) 0.1 % cream [TRIAMCINOLONE (KENALOG) 0.1 % CREAM] Apply to affected areas 3 times a day for 2 weeks (Patient not taking: Reported on 8/23/2021) 45 g 1     Allergies   Allergen Reactions     Other Allergy (See Comments) [External Allergen Needs Reconciliation - See Comment] Unknown     Ointment Base External Ointment, 12/10/2007.  Action: RASH.; Ointment Base External Ointment, 12/10/2007.  Action: RASH.       Social History     Tobacco Use     Smoking status: Never Smoker     Smokeless tobacco: Never Used   Substance Use Topics     Alcohol use: Yes     Alcohol/week: 7.0 standard drinks     Types: 7 Standard drinks or equivalent per week     Drug use: No

## 2021-11-22 ENCOUNTER — ALLIED HEALTH/NURSE VISIT (OUTPATIENT)
Dept: FAMILY MEDICINE | Facility: CLINIC | Age: 66
End: 2021-11-22
Payer: COMMERCIAL

## 2021-11-22 DIAGNOSIS — E53.8 VITAMIN B12 DEFICIENCY (NON ANEMIC): Primary | ICD-10-CM

## 2021-11-22 PROCEDURE — 99207 PR NO CHARGE NURSE ONLY: CPT

## 2021-11-22 PROCEDURE — 96372 THER/PROPH/DIAG INJ SC/IM: CPT | Performed by: INTERNAL MEDICINE

## 2021-11-22 RX ADMIN — CYANOCOBALAMIN 1000 MCG: 1000 INJECTION, SOLUTION INTRAMUSCULAR; SUBCUTANEOUS at 11:06

## 2021-12-23 ENCOUNTER — ALLIED HEALTH/NURSE VISIT (OUTPATIENT)
Dept: FAMILY MEDICINE | Facility: CLINIC | Age: 66
End: 2021-12-23
Payer: COMMERCIAL

## 2021-12-23 DIAGNOSIS — E53.8 VITAMIN B12 DEFICIENCY (NON ANEMIC): Primary | ICD-10-CM

## 2021-12-23 PROCEDURE — 99207 PR NO CHARGE NURSE ONLY: CPT

## 2021-12-23 PROCEDURE — 96372 THER/PROPH/DIAG INJ SC/IM: CPT | Performed by: INTERNAL MEDICINE

## 2021-12-23 RX ADMIN — CYANOCOBALAMIN 1000 MCG: 1000 INJECTION, SOLUTION INTRAMUSCULAR; SUBCUTANEOUS at 10:56

## 2022-01-24 ENCOUNTER — ALLIED HEALTH/NURSE VISIT (OUTPATIENT)
Dept: FAMILY MEDICINE | Facility: CLINIC | Age: 67
End: 2022-01-24
Payer: COMMERCIAL

## 2022-01-24 DIAGNOSIS — Z23 NEED FOR VACCINATION: Primary | ICD-10-CM

## 2022-01-24 PROCEDURE — 99207 PR NO CHARGE NURSE ONLY: CPT

## 2022-01-24 PROCEDURE — 96372 THER/PROPH/DIAG INJ SC/IM: CPT | Performed by: INTERNAL MEDICINE

## 2022-01-24 RX ADMIN — CYANOCOBALAMIN 1000 MCG: 1000 INJECTION, SOLUTION INTRAMUSCULAR; SUBCUTANEOUS at 09:58

## 2022-02-23 ENCOUNTER — ALLIED HEALTH/NURSE VISIT (OUTPATIENT)
Dept: FAMILY MEDICINE | Facility: CLINIC | Age: 67
End: 2022-02-23
Payer: COMMERCIAL

## 2022-02-23 DIAGNOSIS — E53.8 VITAMIN B12 DEFICIENCY (NON ANEMIC): Primary | ICD-10-CM

## 2022-02-23 PROCEDURE — 99207 PR NO CHARGE NURSE ONLY: CPT

## 2022-02-23 PROCEDURE — 96372 THER/PROPH/DIAG INJ SC/IM: CPT | Performed by: INTERNAL MEDICINE

## 2022-02-23 RX ADMIN — CYANOCOBALAMIN 1000 MCG: 1000 INJECTION, SOLUTION INTRAMUSCULAR; SUBCUTANEOUS at 11:07

## 2022-03-23 ENCOUNTER — ALLIED HEALTH/NURSE VISIT (OUTPATIENT)
Dept: FAMILY MEDICINE | Facility: CLINIC | Age: 67
End: 2022-03-23
Payer: COMMERCIAL

## 2022-03-23 DIAGNOSIS — E53.8 VITAMIN B12 DEFICIENCY (NON ANEMIC): Primary | ICD-10-CM

## 2022-03-23 PROCEDURE — 96372 THER/PROPH/DIAG INJ SC/IM: CPT | Performed by: INTERNAL MEDICINE

## 2022-03-23 PROCEDURE — 99207 PR NO CHARGE NURSE ONLY: CPT

## 2022-03-23 RX ADMIN — CYANOCOBALAMIN 1000 MCG: 1000 INJECTION, SOLUTION INTRAMUSCULAR; SUBCUTANEOUS at 11:09

## 2022-04-27 ENCOUNTER — ALLIED HEALTH/NURSE VISIT (OUTPATIENT)
Dept: FAMILY MEDICINE | Facility: CLINIC | Age: 67
End: 2022-04-27
Payer: COMMERCIAL

## 2022-04-27 DIAGNOSIS — E53.8 VITAMIN B12 DEFICIENCY (NON ANEMIC): Primary | ICD-10-CM

## 2022-04-27 PROCEDURE — 96372 THER/PROPH/DIAG INJ SC/IM: CPT | Performed by: INTERNAL MEDICINE

## 2022-04-27 PROCEDURE — 99207 PR NO CHARGE NURSE ONLY: CPT

## 2022-04-27 RX ADMIN — CYANOCOBALAMIN 1000 MCG: 1000 INJECTION, SOLUTION INTRAMUSCULAR; SUBCUTANEOUS at 11:02

## 2022-05-27 ENCOUNTER — ALLIED HEALTH/NURSE VISIT (OUTPATIENT)
Dept: FAMILY MEDICINE | Facility: CLINIC | Age: 67
End: 2022-05-27
Payer: COMMERCIAL

## 2022-05-27 DIAGNOSIS — E53.8 VITAMIN B12 DEFICIENCY (NON ANEMIC): Primary | ICD-10-CM

## 2022-05-27 PROCEDURE — 96372 THER/PROPH/DIAG INJ SC/IM: CPT | Performed by: INTERNAL MEDICINE

## 2022-05-27 PROCEDURE — 99207 PR NO CHARGE NURSE ONLY: CPT

## 2022-05-27 RX ADMIN — CYANOCOBALAMIN 1000 MCG: 1000 INJECTION, SOLUTION INTRAMUSCULAR; SUBCUTANEOUS at 10:55

## 2022-06-30 ENCOUNTER — ALLIED HEALTH/NURSE VISIT (OUTPATIENT)
Dept: FAMILY MEDICINE | Facility: CLINIC | Age: 67
End: 2022-06-30
Payer: COMMERCIAL

## 2022-06-30 DIAGNOSIS — E53.8 VITAMIN B12 DEFICIENCY (NON ANEMIC): Primary | ICD-10-CM

## 2022-06-30 PROCEDURE — 99207 PR NO CHARGE NURSE ONLY: CPT

## 2022-06-30 PROCEDURE — 96372 THER/PROPH/DIAG INJ SC/IM: CPT | Performed by: INTERNAL MEDICINE

## 2022-06-30 RX ADMIN — CYANOCOBALAMIN 1000 MCG: 1000 INJECTION, SOLUTION INTRAMUSCULAR; SUBCUTANEOUS at 11:15

## 2022-07-15 ENCOUNTER — TRANSFERRED RECORDS (OUTPATIENT)
Dept: HEALTH INFORMATION MANAGEMENT | Facility: CLINIC | Age: 67
End: 2022-07-15

## 2022-08-01 ENCOUNTER — ALLIED HEALTH/NURSE VISIT (OUTPATIENT)
Dept: FAMILY MEDICINE | Facility: CLINIC | Age: 67
End: 2022-08-01
Payer: COMMERCIAL

## 2022-08-01 DIAGNOSIS — Z98.84 BARIATRIC SURGERY STATUS: Primary | ICD-10-CM

## 2022-08-01 PROCEDURE — 96372 THER/PROPH/DIAG INJ SC/IM: CPT | Performed by: INTERNAL MEDICINE

## 2022-08-01 PROCEDURE — 99207 PR NO CHARGE NURSE ONLY: CPT

## 2022-08-01 RX ADMIN — CYANOCOBALAMIN 1000 MCG: 1000 INJECTION, SOLUTION INTRAMUSCULAR; SUBCUTANEOUS at 11:13

## 2022-08-11 DIAGNOSIS — I10 ESSENTIAL HYPERTENSION: ICD-10-CM

## 2022-08-12 RX ORDER — HYDROCHLOROTHIAZIDE 25 MG/1
25 TABLET ORAL DAILY
Qty: 90 TABLET | Refills: 0 | Status: SHIPPED | OUTPATIENT
Start: 2022-08-12 | End: 2022-08-17

## 2022-08-12 NOTE — TELEPHONE ENCOUNTER
"Routing refill request to provider for review/approval because:  Labs out of range:  Na+    Last Written Prescription Date:  8/23/21  Last Fill Quantity: 90,  # refills: 3   Last office visit provider:  10/21/21     Requested Prescriptions   Pending Prescriptions Disp Refills     hydrochlorothiazide (HYDRODIURIL) 25 MG tablet 90 tablet 3     Sig: Take 1 tablet (25 mg) by mouth daily       Diuretics (Including Combos) Protocol Failed - 8/12/2022  2:01 PM        Failed - Normal serum sodium on file in past 12 months     Recent Labs   Lab Test 10/21/21  1153   *              Passed - Blood pressure under 140/90 in past 12 months     BP Readings from Last 3 Encounters:   10/21/21 132/80   08/23/21 (!) 146/74   08/20/20 136/78                 Passed - Recent (12 mo) or future (30 days) visit within the authorizing provider's specialty     Patient has had an office visit with the authorizing provider or a provider within the authorizing providers department within the previous 12 mos or has a future within next 30 days. See \"Patient Info\" tab in inbasket, or \"Choose Columns\" in Meds & Orders section of the refill encounter.              Passed - Medication is active on med list        Passed - Patient is age 18 or older        Passed - No active pregancy on record        Passed - Normal serum creatinine on file in past 12 months     Recent Labs   Lab Test 10/21/21  1153   CR 0.72              Passed - Normal serum potassium on file in past 12 months     Recent Labs   Lab Test 08/23/21  1027   POTASSIUM 5.0                    Passed - No positive pregnancy test in past 12 months             Donald Lester RN 08/12/22 2:01 PM  "

## 2022-08-17 ENCOUNTER — OFFICE VISIT (OUTPATIENT)
Dept: INTERNAL MEDICINE | Facility: CLINIC | Age: 67
End: 2022-08-17
Payer: COMMERCIAL

## 2022-08-17 VITALS
WEIGHT: 174 LBS | DIASTOLIC BLOOD PRESSURE: 70 MMHG | OXYGEN SATURATION: 99 % | HEIGHT: 61 IN | HEART RATE: 78 BPM | SYSTOLIC BLOOD PRESSURE: 104 MMHG | BODY MASS INDEX: 32.85 KG/M2

## 2022-08-17 DIAGNOSIS — Z86.0100 HISTORY OF COLONIC POLYPS: ICD-10-CM

## 2022-08-17 DIAGNOSIS — Z00.00 ENCOUNTER FOR WELLNESS EXAMINATION IN ADULT: Primary | ICD-10-CM

## 2022-08-17 DIAGNOSIS — G47.33 OBSTRUCTIVE SLEEP APNEA ON CPAP: ICD-10-CM

## 2022-08-17 DIAGNOSIS — M85.80 OSTEOPENIA, UNSPECIFIED LOCATION: ICD-10-CM

## 2022-08-17 DIAGNOSIS — I10 ESSENTIAL HYPERTENSION: ICD-10-CM

## 2022-08-17 DIAGNOSIS — Z51.81 ENCOUNTER FOR THERAPEUTIC DRUG MONITORING: ICD-10-CM

## 2022-08-17 PROCEDURE — G0439 PPPS, SUBSEQ VISIT: HCPCS | Performed by: INTERNAL MEDICINE

## 2022-08-17 RX ORDER — HYDROCHLOROTHIAZIDE 25 MG/1
25 TABLET ORAL DAILY
Qty: 90 TABLET | Refills: 3 | Status: SHIPPED | OUTPATIENT
Start: 2022-08-17 | End: 2023-08-17

## 2022-08-17 ASSESSMENT — ENCOUNTER SYMPTOMS
WEAKNESS: 0
NERVOUS/ANXIOUS: 0
DYSURIA: 0
DIZZINESS: 0
ABDOMINAL PAIN: 0
HEMATURIA: 0
BREAST MASS: 0
NAUSEA: 0
PARESTHESIAS: 0
FEVER: 0
DIARRHEA: 0
PALPITATIONS: 0
EYE PAIN: 0
HEMATOCHEZIA: 0
SORE THROAT: 0
JOINT SWELLING: 0
CHILLS: 0
MYALGIAS: 0
SHORTNESS OF BREATH: 0
COUGH: 0
ARTHRALGIAS: 0
HEARTBURN: 1
HEADACHES: 0
CONSTIPATION: 0
FREQUENCY: 0

## 2022-08-17 ASSESSMENT — ACTIVITIES OF DAILY LIVING (ADL): CURRENT_FUNCTION: NO ASSISTANCE NEEDED

## 2022-08-17 NOTE — PROGRESS NOTES
SUBJECTIVE:   Dandy Mejia is a 67 year old female who presents for Preventive Visit.  Lives independently with .  She is retired hairdresser.  She is a past history of sleep apnea but highly compliant with CPAP and has a new machine.  She feels is working well and minimal daytime sleepiness.  She has benefited from the CPAP.  She does need new mask and supplies.    She has been losing weight, is lost 21 pounds with improved diet and exercise.  She is walking most days.  Good exertional ability.    Status post gastric bypass previously.  Still on monthly B12 shots and vitamin supplements.    No palpitations or history of atrial fibrillation.  No increasing shortness of breath.    Does have chronic lower extremity edema, stable at +1 currently.    Status post hysterectomy 1993.  She also had a DVT over 10 years ago.  She remains on low-dose aspirin.    Continues on HCTZ 25 mg a day and losartan 25 mg a day.    Her blood pressures been mostly in the 120s/70s.  She denies any orthostasis or lightheaded dizzy spells since she is lost significant weight.  She has had a couple of lower blood pressures around 100/60 but denies lightheaded dizzy spells.    No chest pain or chest pressure.  She does have a family history of coronary disease with her mother.  She is never smoked.  August 2021  with HDL 54, has not been on a statin.    No new breast lumps or changes.  Negative mammogram last year and has one scheduled for next week.    Status post hysterectomy.  No urinary symptoms.    2016 DEXA scan with a femur score -1.4/-1.3.  Spine score -1.0.  No fracture history.    She had multiple polyps in 2019 and just had her colonoscopy done earlier this summer.  She reports there was just 1 polyp and she was told it was a 5-year follow-up plan.  Bowels are otherwise regular without blood in stool now.    She saw ophthalmology last fall, no vision issues.  No new headaches.    No mouth sores or swallowing  "difficulty.    No falls.  No other musculoskeletal pain complaints.      Patient has been advised of split billing requirements and indicates understanding: Yes  Are you in the first 12 months of your Medicare coverage?  No    Healthy Habits:     In general, how would you rate your overall health?  Good    Frequency of exercise:  None    Do you usually eat at least 4 servings of fruit and vegetables a day, include whole grains    & fiber and avoid regularly eating high fat or \"junk\" foods?  Yes    Taking medications regularly:  Yes    Medication side effects:  None    Ability to successfully perform activities of daily living:  No assistance needed    Home Safety:  No safety concerns identified    Hearing Impairment:  No hearing concerns    In the past 6 months, have you been bothered by leaking of urine?  No    In general, how would you rate your overall mental or emotional health?  Good      PHQ-2 Total Score: 0    Additional concerns today:  No    Do you feel safe in your environment? Yes    Have you ever done Advance Care Planning? (For example, a Health Directive, POLST, or a discussion with a medical provider or your loved ones about your wishes): Yes, advance care planning is on file.      Fall risk  Fallen 2 or more times in the past year?: No  Any fall with injury in the past year?: No  click delete button to remove this line now  Cognitive Screening   1) Repeat 3 items (Leader, Season, Table)    2) Clock draw: NORMAL  3) 3 item recall: Recalls 3 objects  Results: 3 items recalled: COGNITIVE IMPAIRMENT LESS LIKELY    Mini-CogTM Copyright JESSE Roland. Licensed by the author for use in [facility]; reprinted with permission (jacqueline@.[email or web address]). All rights reserved.      Do you have sleep apnea, excessive snoring or daytime drowsiness?: yes    Reviewed and updated as needed this visit by clinical staff   Tobacco  Allergies  Meds                Reviewed and updated as needed this visit by Provider     " Meds               Social History     Tobacco Use     Smoking status: Never Smoker     Smokeless tobacco: Never Used   Substance Use Topics     Alcohol use: Yes     Alcohol/week: 7.0 standard drinks     Types: 7 Standard drinks or equivalent per week     If you drink alcohol do you typically have >3 drinks per day or >7 drinks per week? No    Alcohol Use 8/17/2022   Prescreen: >3 drinks/day or >7 drinks/week? No   Prescreen: >3 drinks/day or >7 drinks/week? -   No flowsheet data found.      Current providers sharing in care for this patient include:   Patient Care Team:  Suman Doshi MD as PCP - General (Internal Medicine)  Suman Doshi MD as Assigned PCP  Suman Doshi MD as Physician (Internal Medicine)    The following health maintenance items are reviewed in Epic and correct as of today:  Health Maintenance Due   Topic Date Due     ANNUAL REVIEW OF  ORDERS  Never done     DTAP/TDAP/TD IMMUNIZATION (1 - Tdap) 01/09/2013     ZOSTER IMMUNIZATION (2 of 2) 08/01/2016     MAMMO SCREENING  08/10/2022     Labs ordered for future Lamisil  Patient Active Problem List   Diagnosis     Obstructive sleep apnea     Thrombophlebitis Of The Left Deep Femoral Vein     Post-gastric Bypass For Obesity     Nephrolithiasis     Adenomatous colon polyp (02/2014)     Essential hypertension     Past Surgical History:   Procedure Laterality Date     CHOLECYSTECTOMY       GASTRIC BYPASS  1980     HYSTERECTOMY TOTAL ABDOMINAL  1993     RELEASE CARPAL TUNNEL Right      TONSILLECTOMY       TUBAL LIGATION         Social History     Tobacco Use     Smoking status: Never Smoker     Smokeless tobacco: Never Used   Substance Use Topics     Alcohol use: Yes     Alcohol/week: 7.0 standard drinks     Types: 7 Standard drinks or equivalent per week     Family History   Problem Relation Age of Onset     Heart Failure Mother      Atrial fibrillation Mother      Macular Degeneration Mother      Diabetes Type 2  Father         Older-age onset  "of diabetes for multiple family members in father's side     Cancer Sister         \"Female cancer wi lymph node removal\"     Glaucoma Sister         Being watched for glaucoma     Depression Sister      Fibromyalgia Sister      Glaucoma Sister          Current Outpatient Medications   Medication Sig Dispense Refill     aspirin 81 MG EC tablet [ASPIRIN 81 MG EC TABLET] Take 81 mg by mouth daily.       Calcium Carbonate-Vit D-Min (CALCIUM 1200 PO)        cholecalciferol, vitamin D3, 5,000 unit Tab [CHOLECALCIFEROL, VITAMIN D3, 5,000 UNIT TAB] Take 1 tablet by mouth daily.        ferrous sulfate 325 (65 FE) MG tablet [FERROUS SULFATE 325 (65 FE) MG TABLET] Take 325 mg by mouth.       fish oil-omega-3 fatty acids 1000 MG capsule Take 2 g by mouth daily       hydrochlorothiazide (HYDRODIURIL) 25 MG tablet Take 1 tablet (25 mg) by mouth daily 90 tablet 3     losartan (COZAAR) 25 MG tablet Take 1 tablet (25 mg) by mouth 2 times daily 180 tablet 3     Probiotic Product (DAILY DIGESTIVE PROBIOTIC PO)        triamcinolone (KENALOG) 0.1 % cream [TRIAMCINOLONE (KENALOG) 0.1 % CREAM] Apply to affected areas 3 times a day for 2 weeks 45 g 1     Allergies   Allergen Reactions     Other Allergy (See Comments) [External Allergen Needs Reconciliation - See Comment] Unknown     Ointment Base External Ointment, 12/10/2007.  Action: RASH.; Ointment Base External Ointment, 12/10/2007.  Action: RASH.           Breast CA Risk Assessment (FHS-7) 8/17/2022   Do you have a family history of breast, colon, or ovarian cancer? No / Unknown       click delete button to remove this line now  August 2021 she had a negative mammogram  Pertinent mammograms are reviewed under the imaging tab.    Review of Systems   Constitutional: Negative for chills and fever.   HENT: Negative for congestion, ear pain, hearing loss and sore throat.    Eyes: Positive for visual disturbance. Negative for pain.   Respiratory: Negative for cough and shortness of breath. " "   Cardiovascular: Positive for peripheral edema. Negative for chest pain and palpitations.   Gastrointestinal: Positive for heartburn. Negative for abdominal pain, constipation, diarrhea, hematochezia and nausea.   Breasts:  Negative for tenderness, breast mass and discharge.   Genitourinary: Negative for dysuria, frequency, genital sores, hematuria, pelvic pain, urgency, vaginal bleeding and vaginal discharge.   Musculoskeletal: Negative for arthralgias, joint swelling and myalgias.   Skin: Negative for rash.   Neurological: Negative for dizziness, weakness, headaches and paresthesias.   Psychiatric/Behavioral: Negative for mood changes. The patient is not nervous/anxious.      Constitutional, HEENT, cardiovascular, pulmonary, GI, , musculoskeletal, neuro, skin, endocrine and psych systems are negative, except as otherwise noted.    OBJECTIVE:   /70 (BP Location: Right arm, Patient Position: Sitting)   Pulse 78   Ht 1.549 m (5' 1\")   Wt 78.9 kg (174 lb)   SpO2 99%   BMI 32.88 kg/m   Estimated body mass index is 32.88 kg/m  as calculated from the following:    Height as of this encounter: 1.549 m (5' 1\").    Weight as of this encounter: 78.9 kg (174 lb).  Physical Exam  \Alert and oriented x3 with good mood and affect.  Normal cognition.  Normal mobility.  Clock face drawing and word recall normal.  Pupils and irises equal and reactive.  Extraocular muscles intact.  No jaundice or conjunctivitis.  External ears and nose exam is normal.  Normal tympanic membranes.  No cervical or supraclavicular or axillary adenopathy.  No JVD and no carotid bruits.  No thyromegaly or nodularity.  Lungs are clear to auscultation with good respiratory excursion.  Heart is regular without murmur rub or gallop.  There is +1 ankle edema to the midshin bilaterally.  No stasis dermatitis or skin breakdown.  No calf tenderness bilaterally.  Abdomen is mildly overweight but nontender.  No hepatosplenomegaly and no pulsatile " abdominal mass.  She declined breast exam.  Skin exam is without suspicious skin lesions.  ASSESSMENT / PLAN:   (Z00.00) Encounter for wellness examination in adult  (primary encounter diagnosis)  Comment: Main issue for health maintenance is continued regular exercise and maintaining weight loss, which she is doing.  Continue current treatment plan.    We did discuss lower blood pressures that can occur with her weight loss.  Plan: She is full code.    Yearly mammogram scheduled for next week.    Yearly eye exam due this fall    Up-to-date on immunizations, but plan flu shot in the COVID shot this fall.  We did discuss the Shingrix vaccine as an option but she declined.    (I10) Essential hypertension  Comment: Well-controlled.  Some borderline lower blood pressures with weight loss.  She wants to continue on the current dose of hydrochlorothiazide with her chronic lower extremity edema consistent with venous insufficiency.    She is currently on losartan 25 mg twice daily.  If her blood pressure does get lower, less than 110/60 or more lightheaded dizzy spells, she was told to stop the losartan, but did not assess blood pressure closely and recheck her potassium level 2 weeks after stopping losartan.    Blood pressure remained stable and controlled and no changes, follow-up in 1 year for recheck  Plan: hydrochlorothiazide (HYDRODIURIL) 25 MG tablet,        Lipid Profile        Not planning statin.    (G47.33,  Z99.89) Obstructive sleep apnea on CPAP  Comment: Well controlled with current CPAP.  Patient is benefiting from CPAP.  Prescription given for supplies replacement  Plan: CPAP Order for DME - ONLY FOR DME            (M85.80) Osteopenia, unspecified location  Comment: Continue daily walking.  Continue calcium and vitamin D.  Plan: Patient wanted to defer DEXA scan until next year    (Z86.010) History of colonic polyps  Comment: 1 polyp earlier this summer  Plan: 5-year colonoscopy follow-up plan  I did review  "the colonoscopy report from July 15, 2022 with a sessile serrated adenoma 6 mm, follow-up plan was not specifically mentioned in the note, however.  Plan 5-year follow-up.  (Z51.81) Encounter for therapeutic drug monitoring  Comment:   Plan: Comprehensive metabolic panel, CBC with         platelets              Patient has been advised of split billing requirements and indicates understanding: Yes    COUNSELING:  Reviewed preventive health counseling, as reflected in patient instructions       Regular exercise       Healthy diet/nutrition       Vision screening    Estimated body mass index is 32.88 kg/m  as calculated from the following:    Height as of this encounter: 1.549 m (5' 1\").    Weight as of this encounter: 78.9 kg (174 lb).        She reports that she has never smoked. She has never used smokeless tobacco.      Appropriate preventive services were discussed with this patient, including applicable screening as appropriate for cardiovascular disease, diabetes, osteopenia/osteoporosis, and glaucoma.  As appropriate for age/gender, discussed screening for colorectal cancer, prostate cancer, breast cancer, and cervical cancer. Checklist reviewing preventive services available has been given to the patient.    Reviewed patients plan of care and provided an AVS. The Basic Care Plan (routine screening as documented in Health Maintenance) for Dandy meets the Care Plan requirement. This Care Plan has been established and reviewed with the Patient.    Counseling Resources:  ATP IV Guidelines  Pooled Cohorts Equation Calculator  Breast Cancer Risk Calculator  Breast Cancer: Medication to Reduce Risk  FRAX Risk Assessment  ICSI Preventive Guidelines  Dietary Guidelines for Americans, 2010  USDA's MyPlate  ASA Prophylaxis  Lung CA Screening    Suman Doshi MD  [facility]    Identified Health Risks:  "

## 2022-08-17 NOTE — PATIENT INSTRUCTIONS
If you continue to lose weight, your blood pressure may get lower.    If blood pressure is more often less than 110/60 or you are having increasing lightheaded dizzy spells, stop the losartan.    Goal blood pressure less than 135/85.    Your potassium may get too low off the losartan.  Recheck potassium level approximately 2 weeks after stopping losartan, if you do that.    Schedule fasting labs later this month when you are coming in for a B12 shot.    See me in 1 year for physical exam, if blood pressure is well controlled and no other issues.    See ophthalmology for yearly eye exam this fall, as planned.    Plan to repeat your DEXA bone density scan next year.    Continue yearly mammograms, as scheduled later this month.

## 2022-08-26 ENCOUNTER — HOSPITAL ENCOUNTER (OUTPATIENT)
Dept: MAMMOGRAPHY | Facility: CLINIC | Age: 67
Discharge: HOME | End: 2022-08-26
Attending: INTERNAL MEDICINE | Admitting: INTERNAL MEDICINE
Payer: COMMERCIAL

## 2022-08-26 DIAGNOSIS — Z12.31 VISIT FOR SCREENING MAMMOGRAM: ICD-10-CM

## 2022-08-26 PROCEDURE — 77067 SCR MAMMO BI INCL CAD: CPT

## 2022-08-29 ENCOUNTER — ALLIED HEALTH/NURSE VISIT (OUTPATIENT)
Dept: FAMILY MEDICINE | Facility: CLINIC | Age: 67
End: 2022-08-29
Payer: COMMERCIAL

## 2022-08-29 ENCOUNTER — LAB (OUTPATIENT)
Dept: LAB | Facility: CLINIC | Age: 67
End: 2022-08-29

## 2022-08-29 DIAGNOSIS — Z51.81 ENCOUNTER FOR THERAPEUTIC DRUG MONITORING: ICD-10-CM

## 2022-08-29 DIAGNOSIS — I10 ESSENTIAL HYPERTENSION: ICD-10-CM

## 2022-08-29 DIAGNOSIS — E53.8 VITAMIN B12 DEFICIENCY (NON ANEMIC): Primary | ICD-10-CM

## 2022-08-29 LAB
ALBUMIN SERPL BCG-MCNC: 3.6 G/DL (ref 3.5–5.2)
ALP SERPL-CCNC: 48 U/L (ref 35–104)
ALT SERPL W P-5'-P-CCNC: 23 U/L (ref 10–35)
ANION GAP SERPL CALCULATED.3IONS-SCNC: 9 MMOL/L (ref 7–15)
AST SERPL W P-5'-P-CCNC: 27 U/L (ref 10–35)
BILIRUB SERPL-MCNC: 0.4 MG/DL
BUN SERPL-MCNC: 11.1 MG/DL (ref 8–23)
CALCIUM SERPL-MCNC: 9.2 MG/DL (ref 8.8–10.2)
CHLORIDE SERPL-SCNC: 98 MMOL/L (ref 98–107)
CHOLEST SERPL-MCNC: 194 MG/DL
CREAT SERPL-MCNC: 0.68 MG/DL (ref 0.51–0.95)
DEPRECATED HCO3 PLAS-SCNC: 29 MMOL/L (ref 22–29)
ERYTHROCYTE [DISTWIDTH] IN BLOOD BY AUTOMATED COUNT: 12.3 % (ref 10–15)
GFR SERPL CREATININE-BSD FRML MDRD: >90 ML/MIN/1.73M2
GLUCOSE SERPL-MCNC: 103 MG/DL (ref 70–99)
HCT VFR BLD AUTO: 37.9 % (ref 35–47)
HDLC SERPL-MCNC: 68 MG/DL
HGB BLD-MCNC: 12.5 G/DL (ref 11.7–15.7)
LDLC SERPL CALC-MCNC: 104 MG/DL
MCH RBC QN AUTO: 31.6 PG (ref 26.5–33)
MCHC RBC AUTO-ENTMCNC: 33 G/DL (ref 31.5–36.5)
MCV RBC AUTO: 96 FL (ref 78–100)
NONHDLC SERPL-MCNC: 126 MG/DL
PLATELET # BLD AUTO: 308 10E3/UL (ref 150–450)
POTASSIUM SERPL-SCNC: 4.5 MMOL/L (ref 3.4–5.3)
PROT SERPL-MCNC: 5.3 G/DL (ref 6.4–8.3)
RBC # BLD AUTO: 3.95 10E6/UL (ref 3.8–5.2)
SODIUM SERPL-SCNC: 136 MMOL/L (ref 136–145)
TRIGL SERPL-MCNC: 111 MG/DL
WBC # BLD AUTO: 5.9 10E3/UL (ref 4–11)

## 2022-08-29 PROCEDURE — 96372 THER/PROPH/DIAG INJ SC/IM: CPT | Performed by: INTERNAL MEDICINE

## 2022-08-29 PROCEDURE — 80061 LIPID PANEL: CPT

## 2022-08-29 PROCEDURE — 36415 COLL VENOUS BLD VENIPUNCTURE: CPT

## 2022-08-29 PROCEDURE — 85027 COMPLETE CBC AUTOMATED: CPT

## 2022-08-29 PROCEDURE — 99207 PR NO CHARGE NURSE ONLY: CPT

## 2022-08-29 PROCEDURE — 80053 COMPREHEN METABOLIC PANEL: CPT

## 2022-08-29 RX ADMIN — CYANOCOBALAMIN 1000 MCG: 1000 INJECTION, SOLUTION INTRAMUSCULAR; SUBCUTANEOUS at 10:47

## 2022-09-29 ENCOUNTER — ALLIED HEALTH/NURSE VISIT (OUTPATIENT)
Dept: FAMILY MEDICINE | Facility: CLINIC | Age: 67
End: 2022-09-29
Payer: COMMERCIAL

## 2022-09-29 DIAGNOSIS — Z23 NEED FOR VACCINATION: Primary | ICD-10-CM

## 2022-09-29 PROCEDURE — 99207 PR NO CHARGE NURSE ONLY: CPT

## 2022-09-29 PROCEDURE — 96372 THER/PROPH/DIAG INJ SC/IM: CPT | Performed by: INTERNAL MEDICINE

## 2022-09-29 RX ADMIN — CYANOCOBALAMIN 1000 MCG: 1000 INJECTION, SOLUTION INTRAMUSCULAR; SUBCUTANEOUS at 11:06

## 2022-10-01 ENCOUNTER — HEALTH MAINTENANCE LETTER (OUTPATIENT)
Age: 67
End: 2022-10-01

## 2022-10-31 ENCOUNTER — ALLIED HEALTH/NURSE VISIT (OUTPATIENT)
Dept: FAMILY MEDICINE | Facility: CLINIC | Age: 67
End: 2022-10-31
Payer: COMMERCIAL

## 2022-10-31 DIAGNOSIS — Z98.84 BARIATRIC SURGERY STATUS: Primary | ICD-10-CM

## 2022-10-31 PROCEDURE — 96372 THER/PROPH/DIAG INJ SC/IM: CPT | Performed by: INTERNAL MEDICINE

## 2022-10-31 PROCEDURE — G0008 ADMIN INFLUENZA VIRUS VAC: HCPCS

## 2022-10-31 PROCEDURE — 99207 PR NO CHARGE NURSE ONLY: CPT

## 2022-10-31 PROCEDURE — 90662 IIV NO PRSV INCREASED AG IM: CPT

## 2022-10-31 RX ADMIN — CYANOCOBALAMIN 1000 MCG: 1000 INJECTION, SOLUTION INTRAMUSCULAR; SUBCUTANEOUS at 11:39

## 2022-11-30 ENCOUNTER — ALLIED HEALTH/NURSE VISIT (OUTPATIENT)
Dept: FAMILY MEDICINE | Facility: CLINIC | Age: 67
End: 2022-11-30
Payer: COMMERCIAL

## 2022-11-30 DIAGNOSIS — M81.0 OSTEOPOROSIS: Primary | ICD-10-CM

## 2022-11-30 PROCEDURE — 99207 PR NO CHARGE NURSE ONLY: CPT

## 2022-11-30 PROCEDURE — 96372 THER/PROPH/DIAG INJ SC/IM: CPT | Performed by: INTERNAL MEDICINE

## 2022-11-30 RX ADMIN — CYANOCOBALAMIN 1000 MCG: 1000 INJECTION, SOLUTION INTRAMUSCULAR; SUBCUTANEOUS at 11:31

## 2022-12-30 ENCOUNTER — ALLIED HEALTH/NURSE VISIT (OUTPATIENT)
Dept: FAMILY MEDICINE | Facility: CLINIC | Age: 67
End: 2022-12-30
Payer: COMMERCIAL

## 2022-12-30 DIAGNOSIS — E53.8 VITAMIN B12 DEFICIENCY (NON ANEMIC): Primary | ICD-10-CM

## 2022-12-30 PROCEDURE — 99207 PR NO CHARGE NURSE ONLY: CPT

## 2022-12-30 PROCEDURE — 96372 THER/PROPH/DIAG INJ SC/IM: CPT | Performed by: INTERNAL MEDICINE

## 2022-12-30 RX ADMIN — CYANOCOBALAMIN 1000 MCG: 1000 INJECTION, SOLUTION INTRAMUSCULAR; SUBCUTANEOUS at 11:32

## 2023-01-30 ENCOUNTER — ALLIED HEALTH/NURSE VISIT (OUTPATIENT)
Dept: FAMILY MEDICINE | Facility: CLINIC | Age: 68
End: 2023-01-30
Payer: COMMERCIAL

## 2023-01-30 DIAGNOSIS — E53.8 VITAMIN B12 DEFICIENCY (NON ANEMIC): Primary | ICD-10-CM

## 2023-01-30 PROCEDURE — 99207 PR NO CHARGE NURSE ONLY: CPT

## 2023-01-30 PROCEDURE — 96372 THER/PROPH/DIAG INJ SC/IM: CPT | Performed by: INTERNAL MEDICINE

## 2023-01-30 RX ADMIN — CYANOCOBALAMIN 1000 MCG: 1000 INJECTION, SOLUTION INTRAMUSCULAR; SUBCUTANEOUS at 12:15

## 2023-02-28 ENCOUNTER — ALLIED HEALTH/NURSE VISIT (OUTPATIENT)
Dept: FAMILY MEDICINE | Facility: CLINIC | Age: 68
End: 2023-02-28
Payer: COMMERCIAL

## 2023-02-28 DIAGNOSIS — E53.8 VITAMIN B12 DEFICIENCY (NON ANEMIC): Primary | ICD-10-CM

## 2023-02-28 PROCEDURE — 99207 PR NO CHARGE NURSE ONLY: CPT

## 2023-02-28 PROCEDURE — 96372 THER/PROPH/DIAG INJ SC/IM: CPT | Performed by: INTERNAL MEDICINE

## 2023-02-28 RX ADMIN — CYANOCOBALAMIN 1000 MCG: 1000 INJECTION, SOLUTION INTRAMUSCULAR; SUBCUTANEOUS at 10:54

## 2023-03-10 ENCOUNTER — TELEPHONE (OUTPATIENT)
Dept: INTERNAL MEDICINE | Facility: CLINIC | Age: 68
End: 2023-03-10
Payer: COMMERCIAL

## 2023-03-10 DIAGNOSIS — E53.8 VITAMIN B12 DEFICIENCY (NON ANEMIC): Primary | ICD-10-CM

## 2023-03-10 NOTE — TELEPHONE ENCOUNTER
Patient is on the [facility] schedule for B12 but needs an updated order.    Order pended.  Milena Overton CMA ............... 2:58 PM, 03/10/23

## 2023-03-12 RX ORDER — CYANOCOBALAMIN 1000 UG/ML
1000 INJECTION, SOLUTION INTRAMUSCULAR; SUBCUTANEOUS
Status: ACTIVE | OUTPATIENT
Start: 2023-03-12 | End: 2024-04-05

## 2023-03-28 ENCOUNTER — ALLIED HEALTH/NURSE VISIT (OUTPATIENT)
Dept: FAMILY MEDICINE | Facility: CLINIC | Age: 68
End: 2023-03-28
Payer: COMMERCIAL

## 2023-03-28 DIAGNOSIS — E53.8 VITAMIN B12 DEFICIENCY (NON ANEMIC): Primary | ICD-10-CM

## 2023-03-28 PROCEDURE — 99207 PR DROP WITH A PROCEDURE: CPT

## 2023-03-28 PROCEDURE — 96372 THER/PROPH/DIAG INJ SC/IM: CPT | Performed by: INTERNAL MEDICINE

## 2023-03-28 RX ADMIN — CYANOCOBALAMIN 1000 MCG: 1000 INJECTION, SOLUTION INTRAMUSCULAR; SUBCUTANEOUS at 11:12

## 2023-03-28 NOTE — PROGRESS NOTES
Patient is here today for a B12 injeciton.    Clinic Administered Medication Documentation      Injectable Medication Documentation    Is there an active order (written within the past 365 days, with administrations remaining, not ) in the chart? Yes.     Patient was given Cyanocobalamin (B-12). Prior to medication administration, verified patient's identity using patient s name and date of birth. Please see MAR and medication order for additional information. Patient instructed to report any adverse reaction to staff immediately.    Vial/Syringe: Single dose vial. Was entire vial of medication used? Yes  Was this medication supplied by the patient? No  Is this a medication the patient will need to receive again? Yes. Verified that the patient has refills remaining in their prescription.

## 2023-03-31 ENCOUNTER — TRANSFERRED RECORDS (OUTPATIENT)
Dept: HEALTH INFORMATION MANAGEMENT | Facility: CLINIC | Age: 68
End: 2023-03-31

## 2023-04-27 ENCOUNTER — ALLIED HEALTH/NURSE VISIT (OUTPATIENT)
Dept: FAMILY MEDICINE | Facility: CLINIC | Age: 68
End: 2023-04-27
Payer: COMMERCIAL

## 2023-04-27 DIAGNOSIS — E53.8 VITAMIN B12 DEFICIENCY (NON ANEMIC): Primary | ICD-10-CM

## 2023-04-27 PROCEDURE — 99207 PR NO CHARGE NURSE ONLY: CPT

## 2023-04-27 PROCEDURE — 96372 THER/PROPH/DIAG INJ SC/IM: CPT | Performed by: INTERNAL MEDICINE

## 2023-04-27 RX ADMIN — CYANOCOBALAMIN 1000 MCG: 1000 INJECTION, SOLUTION INTRAMUSCULAR; SUBCUTANEOUS at 11:22

## 2023-04-27 NOTE — PROGRESS NOTES
Clinic Administered Medication Documentation      Injectable Medication Documentation    Is there an active order (written within the past 365 days, with administrations remaining, not ) in the chart? Yes.     Patient was given Cyanocobalamin (B-12). Prior to medication administration, verified patient's identity using patient s name and date of birth. Please see MAR and medication order for additional information. Patient instructed to remain in clinic for 15 minutes and report any adverse reaction to staff immediately.    Vial/Syringe: Single dose vial. Was entire vial of medication used? Yes  Was this medication supplied by the patient? No  Is this a medication the patient will need to receive again? Yes. Verified that the patient has refills remaining in their prescription.

## 2023-05-30 ENCOUNTER — ALLIED HEALTH/NURSE VISIT (OUTPATIENT)
Dept: FAMILY MEDICINE | Facility: CLINIC | Age: 68
End: 2023-05-30
Payer: COMMERCIAL

## 2023-05-30 DIAGNOSIS — E53.8 VITAMIN B12 DEFICIENCY (NON ANEMIC): Primary | ICD-10-CM

## 2023-05-30 PROCEDURE — 99207 PR NO CHARGE NURSE ONLY: CPT

## 2023-05-30 PROCEDURE — 96372 THER/PROPH/DIAG INJ SC/IM: CPT | Performed by: INTERNAL MEDICINE

## 2023-05-30 RX ADMIN — CYANOCOBALAMIN 1000 MCG: 1000 INJECTION, SOLUTION INTRAMUSCULAR; SUBCUTANEOUS at 11:28

## 2023-05-30 NOTE — PROGRESS NOTES
Clinic Administered Medication Documentation        Patient was given B12. Prior to medication administration, verified patient's identity using patient s name and date of birth. Please see MAR and medication order for additional information. Patient instructed to remain in clinic for 15 minutes, report any adverse reaction to staff immediately and remain in clinic for 15 minutes and report any adverse reaction to staff immediately but patient declined.    Vial/Syringe: Single dose vial. Was entire vial of medication used? Yes

## 2023-06-29 ENCOUNTER — ALLIED HEALTH/NURSE VISIT (OUTPATIENT)
Dept: FAMILY MEDICINE | Facility: CLINIC | Age: 68
End: 2023-06-29
Payer: COMMERCIAL

## 2023-06-29 DIAGNOSIS — E53.8 VITAMIN B12 DEFICIENCY (NON ANEMIC): Primary | ICD-10-CM

## 2023-06-29 PROCEDURE — 96372 THER/PROPH/DIAG INJ SC/IM: CPT | Performed by: INTERNAL MEDICINE

## 2023-06-29 PROCEDURE — 99207 PR NO CHARGE NURSE ONLY: CPT

## 2023-06-29 RX ADMIN — CYANOCOBALAMIN 1000 MCG: 1000 INJECTION, SOLUTION INTRAMUSCULAR; SUBCUTANEOUS at 10:59

## 2023-07-27 ENCOUNTER — PATIENT OUTREACH (OUTPATIENT)
Dept: CARE COORDINATION | Facility: CLINIC | Age: 68
End: 2023-07-27

## 2023-07-27 ENCOUNTER — ALLIED HEALTH/NURSE VISIT (OUTPATIENT)
Dept: FAMILY MEDICINE | Facility: CLINIC | Age: 68
End: 2023-07-27
Payer: COMMERCIAL

## 2023-07-27 DIAGNOSIS — E53.8 VITAMIN B12 DEFICIENCY (NON ANEMIC): Primary | ICD-10-CM

## 2023-07-27 PROCEDURE — 99207 PR NO CHARGE NURSE ONLY: CPT

## 2023-07-27 PROCEDURE — 96372 THER/PROPH/DIAG INJ SC/IM: CPT | Performed by: INTERNAL MEDICINE

## 2023-07-27 RX ADMIN — CYANOCOBALAMIN 1000 MCG: 1000 INJECTION, SOLUTION INTRAMUSCULAR; SUBCUTANEOUS at 15:26

## 2023-08-07 DIAGNOSIS — I10 ESSENTIAL HYPERTENSION: ICD-10-CM

## 2023-08-07 RX ORDER — LOSARTAN POTASSIUM 25 MG/1
25 TABLET ORAL 2 TIMES DAILY
Qty: 180 TABLET | Refills: 0 | Status: SHIPPED | OUTPATIENT
Start: 2023-08-07 | End: 2023-08-17

## 2023-08-07 NOTE — TELEPHONE ENCOUNTER
"Last Written Prescription Date:  11/14/22  Last Fill Quantity: 180,  # refills: 2   Last office visit provider:  8/17/22 with xochilt    Requested Prescriptions   Pending Prescriptions Disp Refills    losartan (COZAAR) 25 MG tablet 180 tablet 2     Sig: Take 1 tablet (25 mg) by mouth 2 times daily       Angiotensin-II Receptors Passed - 8/7/2023  3:24 PM        Passed - Last blood pressure under 140/90 in past 12 months     BP Readings from Last 3 Encounters:   08/17/22 104/70   10/21/21 132/80   08/23/21 (!) 146/74                 Passed - Recent (12 mo) or future (30 days) visit within the authorizing provider's specialty     Patient has had an office visit with the authorizing provider or a provider within the authorizing providers department within the previous 12 mos or has a future within next 30 days. See \"Patient Info\" tab in inbasket, or \"Choose Columns\" in Meds & Orders section of the refill encounter.              Passed - Medication is active on med list        Passed - Patient is age 18 or older        Passed - No active pregnancy on record        Passed - Normal serum creatinine on file in past 12 months     Recent Labs   Lab Test 08/29/22  1035   CR 0.68       Ok to refill medication if creatinine is low          Passed - Normal serum potassium on file in past 12 months     Recent Labs   Lab Test 08/29/22  1035   POTASSIUM 4.5                    Passed - No positive pregnancy test in past 12 months             MALIKA CM RN 08/07/23 3:24 PM  "

## 2023-08-17 ENCOUNTER — OFFICE VISIT (OUTPATIENT)
Dept: INTERNAL MEDICINE | Facility: CLINIC | Age: 68
End: 2023-08-17
Payer: COMMERCIAL

## 2023-08-17 VITALS
WEIGHT: 187 LBS | HEIGHT: 62 IN | BODY MASS INDEX: 34.41 KG/M2 | DIASTOLIC BLOOD PRESSURE: 60 MMHG | OXYGEN SATURATION: 99 % | HEART RATE: 66 BPM | RESPIRATION RATE: 16 BRPM | TEMPERATURE: 97.9 F | SYSTOLIC BLOOD PRESSURE: 124 MMHG

## 2023-08-17 DIAGNOSIS — G47.33 OBSTRUCTIVE SLEEP APNEA ON CPAP: ICD-10-CM

## 2023-08-17 DIAGNOSIS — I10 ESSENTIAL HYPERTENSION: ICD-10-CM

## 2023-08-17 DIAGNOSIS — Z51.81 ENCOUNTER FOR THERAPEUTIC DRUG MONITORING: ICD-10-CM

## 2023-08-17 DIAGNOSIS — M79.672 LEFT FOOT PAIN: ICD-10-CM

## 2023-08-17 DIAGNOSIS — Z86.0100 HISTORY OF COLONIC POLYPS: ICD-10-CM

## 2023-08-17 DIAGNOSIS — Z00.00 ENCOUNTER FOR WELLNESS EXAMINATION IN ADULT: Primary | ICD-10-CM

## 2023-08-17 DIAGNOSIS — Z78.0 POSTMENOPAUSAL STATUS: ICD-10-CM

## 2023-08-17 LAB
ALBUMIN SERPL BCG-MCNC: 3.4 G/DL (ref 3.5–5.2)
ALP SERPL-CCNC: 42 U/L (ref 35–104)
ALT SERPL W P-5'-P-CCNC: 26 U/L (ref 0–50)
ANION GAP SERPL CALCULATED.3IONS-SCNC: 9 MMOL/L (ref 7–15)
AST SERPL W P-5'-P-CCNC: 25 U/L (ref 0–45)
BILIRUB SERPL-MCNC: 0.4 MG/DL
BUN SERPL-MCNC: 11 MG/DL (ref 8–23)
CALCIUM SERPL-MCNC: 8.9 MG/DL (ref 8.8–10.2)
CHLORIDE SERPL-SCNC: 100 MMOL/L (ref 98–107)
CHOLEST SERPL-MCNC: 195 MG/DL
CREAT SERPL-MCNC: 0.65 MG/DL (ref 0.51–0.95)
DEPRECATED HCO3 PLAS-SCNC: 29 MMOL/L (ref 22–29)
ERYTHROCYTE [DISTWIDTH] IN BLOOD BY AUTOMATED COUNT: 12.3 % (ref 10–15)
GFR SERPL CREATININE-BSD FRML MDRD: >90 ML/MIN/1.73M2
GLUCOSE SERPL-MCNC: 109 MG/DL (ref 70–99)
HCT VFR BLD AUTO: 37.1 % (ref 35–47)
HDLC SERPL-MCNC: 71 MG/DL
HGB BLD-MCNC: 12 G/DL (ref 11.7–15.7)
LDLC SERPL CALC-MCNC: 101 MG/DL
MCH RBC QN AUTO: 31.9 PG (ref 26.5–33)
MCHC RBC AUTO-ENTMCNC: 32.3 G/DL (ref 31.5–36.5)
MCV RBC AUTO: 99 FL (ref 78–100)
NONHDLC SERPL-MCNC: 124 MG/DL
PLATELET # BLD AUTO: 294 10E3/UL (ref 150–450)
POTASSIUM SERPL-SCNC: 4.1 MMOL/L (ref 3.4–5.3)
PROT SERPL-MCNC: 5.2 G/DL (ref 6.4–8.3)
RBC # BLD AUTO: 3.76 10E6/UL (ref 3.8–5.2)
SODIUM SERPL-SCNC: 138 MMOL/L (ref 136–145)
TRIGL SERPL-MCNC: 113 MG/DL
WBC # BLD AUTO: 5.3 10E3/UL (ref 4–11)

## 2023-08-17 PROCEDURE — 80053 COMPREHEN METABOLIC PANEL: CPT | Performed by: INTERNAL MEDICINE

## 2023-08-17 PROCEDURE — 36415 COLL VENOUS BLD VENIPUNCTURE: CPT | Performed by: INTERNAL MEDICINE

## 2023-08-17 PROCEDURE — G0439 PPPS, SUBSEQ VISIT: HCPCS | Performed by: INTERNAL MEDICINE

## 2023-08-17 PROCEDURE — 80061 LIPID PANEL: CPT | Performed by: INTERNAL MEDICINE

## 2023-08-17 PROCEDURE — 85027 COMPLETE CBC AUTOMATED: CPT | Performed by: INTERNAL MEDICINE

## 2023-08-17 PROCEDURE — 99214 OFFICE O/P EST MOD 30 MIN: CPT | Mod: 25 | Performed by: INTERNAL MEDICINE

## 2023-08-17 RX ORDER — HYDROCHLOROTHIAZIDE 25 MG/1
25 TABLET ORAL DAILY
Qty: 90 TABLET | Refills: 3 | Status: SHIPPED | OUTPATIENT
Start: 2023-08-17 | End: 2024-08-19

## 2023-08-17 RX ORDER — LOSARTAN POTASSIUM 25 MG/1
25 TABLET ORAL 2 TIMES DAILY
Qty: 180 TABLET | Refills: 3 | Status: SHIPPED | OUTPATIENT
Start: 2023-08-17 | End: 2024-01-03

## 2023-08-17 ASSESSMENT — ENCOUNTER SYMPTOMS
DIARRHEA: 0
HEMATOCHEZIA: 0
FREQUENCY: 0
ABDOMINAL PAIN: 0
PALPITATIONS: 0
PARESTHESIAS: 0
NAUSEA: 0
BREAST MASS: 0
HEADACHES: 0
COUGH: 0
WEAKNESS: 0
HEMATURIA: 0
DYSURIA: 0
MYALGIAS: 0
NERVOUS/ANXIOUS: 0
CHILLS: 0
ARTHRALGIAS: 1
SORE THROAT: 0
JOINT SWELLING: 0
FEVER: 0
EYE PAIN: 0
SHORTNESS OF BREATH: 0
CONSTIPATION: 0
HEARTBURN: 1
DIZZINESS: 0

## 2023-08-17 ASSESSMENT — ACTIVITIES OF DAILY LIVING (ADL): CURRENT_FUNCTION: NO ASSISTANCE NEEDED

## 2023-08-17 NOTE — PROGRESS NOTES
"Answers submitted by the patient for this visit:  Annual Preventive Visit (Submitted on 8/17/2023)  Chief Complaint: Annual Exam:  In general, how would you rate your overall physical health?: good  Frequency of exercise:: 1 day/week  Do you usually eat at least 4 servings of fruit and vegetables a day, include whole grains & fiber, and avoid regularly eating high fat or \"junk\" foods? : No  Taking medications regularly:: Yes  Medication side effects:: Not applicable  Activities of Daily Living: no assistance needed  Home safety: no safety concerns identified  Hearing Impairment:: no hearing concerns  In the past 6 months, have you been bothered by leaking of urine?: No  abdominal pain: No  Blood in stool: No  Blood in urine: No  chest pain: No  chills: No  congestion: No  constipation: No  cough: No  diarrhea: No  dizziness: No  ear pain: No  eye pain: No  nervous/anxious: No  fever: No  frequency: No  genital sores: No  headaches: No  hearing loss: No  heartburn: Yes  arthralgias: Yes  joint swelling: No  peripheral edema: Yes  mood changes: No  myalgias: No  nausea: No  dysuria: No  palpitations: No  Skin sensation changes: No  sore throat: No  urgency: No  rash: No  shortness of breath: No  visual disturbance: Yes  weakness: No  pelvic pain: No  vaginal bleeding: No  tenderness: No  breast mass: No  breast discharge: No  In general, how would you rate your overall mental or emotional health?: good  Additional concerns today:: No  Exercise outside of work (Submitted on 8/17/2023)  Chief Complaint: Annual Exam:  Duration of exercise:: Less than 15 minutes  SUBJECTIVE:   Dandy is a 68 year old who presents for Preventive Visit.    Lives independently with .   is active and doing well.    Patient is retired hairdresser.    She lost significant weight last year but gained it back this year.  She has recently not been active as she stubbed her left pinky toe 1 month ago, slowly getting getting better but " she has not been going to the exercise room.    Her apartment building has an exercise room with treadmill and bike that she can go to.    She has obstructive sleep apnea but highly compliant with CPAP and has a new machine that she feels is working well.  She denies significant daytime sleepiness.    History of gastric bypass.  Takes her vitamins and monthly IM B12.  No foot neuropathy symptoms.  No abdominal pain.    She has not been following her diet well recently.    She has chronic lower extremity edema with her obesity and sleep apnea.  That is stable.    She denies increasing shortness of breath.    History of DVT over 10 years ago.  Status post hysterectomy 1993.  She continues on low-dose aspirin.  No history of ulcer or GI bleeding.  No melena or blood in stool.    Hypertension controlled losartan 25 mg a day and HCTZ 25 mg a day.  She denies orthostasis.  She has not recently checked blood pressure.    Family history of coronary disease with mother.  Patient denies chest pain.  No palpitations.  August 2022  and HDL 68, not on statin.    Is never smoked.  No new cough.    No mouth sores or swallowing difficulty.    Blood sugar was 103 last year.  No polyuria or polydipsia.    2016 DEXA scan with a spine score of -1.0.  Femur score -1.4/-1.3.  No fracture history.  She denies falls.    Bowels are normal.  July 2022 colonoscopy with a sessile serrated adenoma, 5-year follow-up plan.    Denies urinary symptoms.  Status post hysterectomy.    She has a mammogram scheduled for later this month.  She denies any new breast lumps or changes.    No headache complaints.    She is ophthalmology within the past year and denies problems.    Her mother passed away recently, and she has not been as active since her mother passed away.      8/17/2023     9:59 AM   Additional Questions   Roomed by Lily PUCKETT MA       Are you in the first 12 months of your Medicare coverage?  No    Healthy Habits:     In general, how  "would you rate your overall health?  Good    Frequency of exercise:  1 day/week    Duration of exercise:  Less than 15 minutes    Do you usually eat at least 4 servings of fruit and vegetables a day, include whole grains    & fiber and avoid regularly eating high fat or \"junk\" foods?  No    Taking medications regularly:  Yes    Medication side effects:  Not applicable    Ability to successfully perform activities of daily living:  No assistance needed    Home Safety:  No safety concerns identified    Hearing Impairment:  No hearing concerns    In the past 6 months, have you been bothered by leaking of urine?  No    In general, how would you rate your overall mental or emotional health?  Good    Additional concerns today:  No        Have you ever done Advance Care Planning? (For example, a Health Directive, POLST, or a discussion with a medical provider or your loved ones about your wishes): Yes, advance care planning is on file.       Fall risk  Low fall risk, normal gait  click delete button to remove this line now  Cognitive Screening   1) Repeat 3 items (Leader, Season, Table)    2) Clock draw: NORMAL  3) 3 item recall: Recalls 3 objects  Results: 3 items recalled: COGNITIVE IMPAIRMENT LESS LIKELY    Mini-CogTM Copyright JESSE Roland. Licensed by the author for use in [facility]; reprinted with permission (soob@[facility]). All rights reserved.      Do you have sleep apnea, excessive snoring or daytime drowsiness? : yes    Reviewed and updated as needed this visit by clinical staff   Tobacco  Allergies  Meds              Reviewed and updated as needed this visit by Provider                 Social History     Tobacco Use    Smoking status: Never    Smokeless tobacco: Never   Substance Use Topics    Alcohol use: Yes     Alcohol/week: 7.0 standard drinks of alcohol     Types: 7 Standard drinks or equivalent per week             8/17/2023     9:58 AM   Alcohol Use   Prescreen: >3 drinks/day or >7 " drinks/week? No          No data to display              Do you have a current opioid prescription? No  Do you use any other controlled substances or medications that are not prescribed by a provider? None              Current providers sharing in care for this patient include:   Patient Care Team:  Suman Doshi MD as PCP - General (Internal Medicine)  Suman Doshi MD as Assigned PCP  Suman Doshi MD as Physician (Internal Medicine)    The following health maintenance items are reviewed in Epic and correct as of today:  Health Maintenance   Topic Date Due    ANNUAL REVIEW OF HM ORDERS  Never done    ZOSTER IMMUNIZATION (2 of 2) 08/01/2016    DTAP/TDAP/TD IMMUNIZATION (3 - Td or Tdap) 01/08/2023    COVID-19 Vaccine (6 - Moderna series) 02/12/2023    MEDICARE ANNUAL WELLNESS VISIT  08/17/2023    MAMMO SCREENING  08/26/2023    INFLUENZA VACCINE (1) 09/01/2023    FALL RISK ASSESSMENT  08/17/2024    ADVANCE CARE PLANNING  08/22/2027    LIPID  08/29/2027    DEXA  06/08/2031    COLORECTAL CANCER SCREENING  07/15/2032    HEPATITIS C SCREENING  Completed    PHQ-2 (once per calendar year)  Completed    Pneumococcal Vaccine: 65+ Years  Completed    IPV IMMUNIZATION  Aged Out    MENINGITIS IMMUNIZATION  Aged Out     Current Outpatient Medications   Medication Sig Dispense Refill    aspirin 81 MG EC tablet [ASPIRIN 81 MG EC TABLET] Take 81 mg by mouth daily.      Calcium Carbonate-Vit D-Min (CALCIUM 1200 PO)       cholecalciferol, vitamin D3, 5,000 unit Tab [CHOLECALCIFEROL, VITAMIN D3, 5,000 UNIT TAB] Take 1 tablet by mouth daily.       ferrous sulfate 325 (65 FE) MG tablet [FERROUS SULFATE 325 (65 FE) MG TABLET] Take 325 mg by mouth.      fish oil-omega-3 fatty acids 1000 MG capsule Take 2 g by mouth daily      hydrochlorothiazide (HYDRODIURIL) 25 MG tablet Take 1 tablet (25 mg) by mouth daily 90 tablet 3    losartan (COZAAR) 25 MG tablet Take 1 tablet (25 mg) by mouth 2 times daily 180 tablet 3    Probiotic Product  "(DAILY DIGESTIVE PROBIOTIC PO)       triamcinolone (KENALOG) 0.1 % cream [TRIAMCINOLONE (KENALOG) 0.1 % CREAM] Apply to affected areas 3 times a day for 2 weeks 45 g 1     Allergies   Allergen Reactions    Other Allergy (See Comments) [External Allergen Needs Reconciliation - See Comment] Unknown     Ointment Base External Ointment, 12/10/2007.  Action: RASH.; Ointment Base External Ointment, 12/10/2007.  Action: RASH.             Review of Systems   Constitutional:  Negative for chills and fever.   HENT:  Negative for congestion, ear pain, hearing loss and sore throat.    Eyes:  Positive for visual disturbance. Negative for pain.   Respiratory:  Negative for cough and shortness of breath.    Cardiovascular:  Positive for peripheral edema. Negative for chest pain and palpitations.   Gastrointestinal:  Positive for heartburn. Negative for abdominal pain, constipation, diarrhea, hematochezia and nausea.   Breasts:  Negative for tenderness, breast mass and discharge.   Genitourinary:  Negative for dysuria, frequency, genital sores, hematuria, pelvic pain, urgency and vaginal bleeding.   Musculoskeletal:  Positive for arthralgias. Negative for joint swelling and myalgias.   Skin:  Negative for rash.   Neurological:  Negative for dizziness, weakness, headaches and paresthesias.   Psychiatric/Behavioral:  Negative for mood changes. The patient is not nervous/anxious.      Vision has been normal.  Edema is chronic.  Heartburn controlled.  Arthralgias with her left foot pain    OBJECTIVE:   /60 (BP Location: Right arm, Patient Position: Sitting, Cuff Size: Adult Regular)   Pulse 66   Temp 97.9  F (36.6  C) (Oral)   Resp 16   Ht 1.575 m (5' 2\")   Wt 84.8 kg (187 lb)   SpO2 99%   BMI 34.20 kg/m   Estimated body mass index is 34.2 kg/m  as calculated from the following:    Height as of this encounter: 1.575 m (5' 2\").    Weight as of this encounter: 84.8 kg (187 lb).  Physical Exam  Moderately overweight female.  " Alert and oriented x3.  Normal cognition.  Word recall normal.  Mobility normal, just a slight limp with her left foot pain.  Able to climb up on exam table.  Pupils and irises equal and reactive.  No jaundice or conjunctivitis.  External ears and nose exam is normal.  Tympanic membranes are normal with minimal cerumen.  Pharynx is within normal limits, some mild postnasal drip pharyngitis.  Teeth in good condition.  No cervical or supraclavicular or axillary adenopathy.  No JVD and no carotid bruits.  No thyromegaly or nodularity.  Lungs are clear to auscultation with good respiratory excursion.  Heart is regular without murmur rub or gallop.  She does have +1 ankle edema to the upper shin bilaterally which is chronic.  Some mild stasis dermatitis on the left ankle.  Abdomen is obese but nontender.  No hepatosplenomegaly.  No pulsatile abdominal mass.  No concerning skin lesions.  She declined breast exam.    ASSESSMENT / PLAN:   (Z00.00) Encounter for wellness examination in adult  (primary encounter diagnosis)  Comment: Main issue for patient is obesity with sleep apnea and lack of regular exercise plan at this time.    I stressed the need for daily exercise plan, and weight loss with reduce carbohydrates.    Patient is full code.    Status post hysterectomy.      Patient instructions:  You need to get back to regular exercise.  Even with your foot pain, you should be able to do the exercise bike for at least 10 minutes a day.    Long-term goal for a 20-minute aerobic workout on a daily basis.  Use the treadmill or bicycle.  If you are going to use the weight machines, avoid using too much weight or straining.  Avoid causing injury on the weight machines.    You need to lose at least 15 pounds.  Avoid baked goods and starchy foods.  No rice or pasta or potatoes.    Continue to use your CPAP machine every night.    Schedule a bone density for osteoporosis screening.    Proceed with mammogram later this month, as  planned.  Continue yearly mammograms.    Your colonoscopy will be due July 2027.    You are due for a tetanus shot which can be obtained at local pharmacy.    Get the new COVID shot and flu shot this fall when it is available.    Monitor blood pressure.  Goal blood pressure less than 135/85 but not less than 110/60.  Blood pressure is outside that range more than occasionally, follow-up in clinic to evaluate your medication.    If you are otherwise stable, follow-up in 1 year for adult wellness visit physical exam.  7      (I10) Essential hypertension  Comment: Controlled.  Continue current medication at  Plan: hydrochlorothiazide (HYDRODIURIL) 25 MG tablet,        losartan (COZAAR) 25 MG tablet, Lipid Profile        Cholesterol levels have been well controlled, not planning statin.    (G47.33,  Z99.89) Obstructive sleep apnea on CPAP  Comment: Compliant with CPAP, which is working well.  Patient counseled to lose weight  Plan:     (Z78.0) Postmenopausal status  Comment: Osteoporosis screening  Plan: DX Hip/Pelvis/Spine            (Z86.010) History of colonic polyps  Comment: 5-year colonoscopy due July 2027  Plan:     (Z51.81) Encounter for therapeutic drug monitoring  Comment:   Plan: CBC with platelets, Comprehensive metabolic         panel            (M79.672) Left foot pain  Comment: Consistent with left pinky toe injury, possible fracture but is 1 month old.  There is some mild tenderness at the pinky toe itself.  There is no erythema or wound or ulcer.  There is no pain in the proximal metatarsals or ankle.  Chronic lower extremity edema.    She is wearing a hard soled sandal and will continue to do that.  Plan:     Patient has been advised of split billing requirements and indicates understanding: Yes      COUNSELING:  Reviewed preventive health counseling, as reflected in patient instructions       Regular exercise       Healthy diet/nutrition      BMI:   Estimated body mass index is 34.2 kg/m  as  "calculated from the following:    Height as of this encounter: 1.575 m (5' 2\").    Weight as of this encounter: 84.8 kg (187 lb).   Weight management plan: Discussed healthy diet and exercise guidelines      She reports that she has never smoked. She has never used smokeless tobacco.      Appropriate preventive services were discussed with this patient, including applicable screening as appropriate for cardiovascular disease, diabetes, osteopenia/osteoporosis, and glaucoma.  As appropriate for age/gender, discussed screening for colorectal cancer, prostate cancer, breast cancer, and cervical cancer. Checklist reviewing preventive services available has been given to the patient.    Reviewed patients plan of care and provided an AVS. The Basic Care Plan (routine screening as documented in Health Maintenance) for Dandy meets the Care Plan requirement. This Care Plan has been established and reviewed with the Patient.          Suman Doshi MD  [facility]    Identified Health Risks:  I have reviewed Opioid Use Disorder and Substance Use Disorder risk factors and made any needed referrals.   "

## 2023-08-17 NOTE — PATIENT INSTRUCTIONS
You need to get back to regular exercise.  Even with your foot pain, you should be able to do the exercise bike for at least 10 minutes a day.    Long-term goal for a 20-minute aerobic workout on a daily basis.  Use the treadmill or bicycle.  If you are going to use the weight machines, avoid using too much weight or straining.  Avoid causing injury on the weight machines.    You need to lose at least 15 pounds.  Avoid baked goods and starchy foods.  No rice or pasta or potatoes.    Continue to use your CPAP machine every night.    Schedule a bone density for osteoporosis screening.    Proceed with mammogram later this month, as planned.  Continue yearly mammograms.    Your colonoscopy will be due July 2027.    You are due for a tetanus shot which can be obtained at local pharmacy.    Get the new COVID shot and flu shot this fall when it is available.    Monitor blood pressure.  Goal blood pressure less than 135/85 but not less than 110/60.  Blood pressure is outside that range more than occasionally, follow-up in clinic to evaluate your medication.    If you are otherwise stable, follow-up in 1 year for adult wellness visit physical exam.

## 2023-08-31 ENCOUNTER — ALLIED HEALTH/NURSE VISIT (OUTPATIENT)
Dept: FAMILY MEDICINE | Facility: CLINIC | Age: 68
End: 2023-08-31
Payer: COMMERCIAL

## 2023-08-31 DIAGNOSIS — E53.8 VITAMIN B12 DEFICIENCY (NON ANEMIC): Primary | ICD-10-CM

## 2023-08-31 PROCEDURE — 99207 PR NO CHARGE NURSE ONLY: CPT

## 2023-08-31 PROCEDURE — 96372 THER/PROPH/DIAG INJ SC/IM: CPT | Performed by: INTERNAL MEDICINE

## 2023-08-31 RX ADMIN — CYANOCOBALAMIN 1000 MCG: 1000 INJECTION, SOLUTION INTRAMUSCULAR; SUBCUTANEOUS at 10:50

## 2023-09-01 ENCOUNTER — ANCILLARY PROCEDURE (OUTPATIENT)
Dept: BONE DENSITY | Facility: CLINIC | Age: 68
End: 2023-09-01
Attending: INTERNAL MEDICINE
Payer: COMMERCIAL

## 2023-09-01 ENCOUNTER — HOSPITAL ENCOUNTER (OUTPATIENT)
Dept: MAMMOGRAPHY | Facility: CLINIC | Age: 68
Discharge: HOME | End: 2023-09-01
Attending: INTERNAL MEDICINE | Admitting: INTERNAL MEDICINE
Payer: COMMERCIAL

## 2023-09-01 DIAGNOSIS — Z12.31 VISIT FOR SCREENING MAMMOGRAM: ICD-10-CM

## 2023-09-01 DIAGNOSIS — Z78.0 POSTMENOPAUSAL STATUS: ICD-10-CM

## 2023-09-01 PROCEDURE — 77067 SCR MAMMO BI INCL CAD: CPT

## 2023-09-01 PROCEDURE — 77080 DXA BONE DENSITY AXIAL: CPT | Mod: TC | Performed by: PHYSICIAN ASSISTANT

## 2023-10-02 ENCOUNTER — ALLIED HEALTH/NURSE VISIT (OUTPATIENT)
Dept: FAMILY MEDICINE | Facility: CLINIC | Age: 68
End: 2023-10-02
Payer: COMMERCIAL

## 2023-10-02 DIAGNOSIS — E53.8 VITAMIN B12 DEFICIENCY (NON ANEMIC): Primary | ICD-10-CM

## 2023-10-02 PROCEDURE — 96372 THER/PROPH/DIAG INJ SC/IM: CPT | Performed by: INTERNAL MEDICINE

## 2023-10-02 PROCEDURE — 99207 PR NO CHARGE NURSE ONLY: CPT

## 2023-10-02 RX ADMIN — CYANOCOBALAMIN 1000 MCG: 1000 INJECTION, SOLUTION INTRAMUSCULAR; SUBCUTANEOUS at 11:04

## 2023-10-31 ENCOUNTER — TRANSFERRED RECORDS (OUTPATIENT)
Dept: HEALTH INFORMATION MANAGEMENT | Facility: CLINIC | Age: 68
End: 2023-10-31
Payer: COMMERCIAL

## 2023-11-02 ENCOUNTER — ALLIED HEALTH/NURSE VISIT (OUTPATIENT)
Dept: FAMILY MEDICINE | Facility: CLINIC | Age: 68
End: 2023-11-02
Payer: COMMERCIAL

## 2023-11-02 DIAGNOSIS — E53.8 VITAMIN B12 DEFICIENCY (NON ANEMIC): Primary | ICD-10-CM

## 2023-11-02 DIAGNOSIS — Z23 INFLUENZA VACCINATION ADMINISTERED AT CURRENT VISIT: ICD-10-CM

## 2023-11-02 PROCEDURE — 90662 IIV NO PRSV INCREASED AG IM: CPT

## 2023-11-02 PROCEDURE — 96372 THER/PROPH/DIAG INJ SC/IM: CPT | Performed by: INTERNAL MEDICINE

## 2023-11-02 PROCEDURE — G0008 ADMIN INFLUENZA VIRUS VAC: HCPCS

## 2023-11-02 RX ADMIN — CYANOCOBALAMIN 1000 MCG: 1000 INJECTION, SOLUTION INTRAMUSCULAR; SUBCUTANEOUS at 11:08

## 2023-12-04 ENCOUNTER — ALLIED HEALTH/NURSE VISIT (OUTPATIENT)
Dept: FAMILY MEDICINE | Facility: CLINIC | Age: 68
End: 2023-12-04
Payer: COMMERCIAL

## 2023-12-04 DIAGNOSIS — E53.8 VITAMIN B12 DEFICIENCY (NON ANEMIC): Primary | ICD-10-CM

## 2023-12-04 PROCEDURE — 96372 THER/PROPH/DIAG INJ SC/IM: CPT | Performed by: INTERNAL MEDICINE

## 2023-12-04 PROCEDURE — 99207 PR NO CHARGE NURSE ONLY: CPT

## 2023-12-04 RX ADMIN — CYANOCOBALAMIN 1000 MCG: 1000 INJECTION, SOLUTION INTRAMUSCULAR; SUBCUTANEOUS at 11:05

## 2023-12-04 NOTE — PROGRESS NOTES
Clinic Administered Medication Documentation      Injectable Medication Documentation    Is there an active order (written within the past 365 days, with administrations remaining, not ) in the chart? Yes.     Patient was given Cyanocobalamin (B-12). Prior to medication administration, verified patient's identity using patient s name and date of birth. Please see MAR and medication order for additional information. Patient instructed to remain in clinic for 15 minutes and report any adverse reaction to staff immediately.    Vial/Syringe: Multi dose vial  Was this medication supplied by the patient? No  Is this a medication the patient will need to receive again? No - not necessary to check for refills remaining.

## 2023-12-13 ENCOUNTER — LAB (OUTPATIENT)
Dept: LAB | Facility: CLINIC | Age: 68
End: 2023-12-13
Payer: COMMERCIAL

## 2023-12-13 ENCOUNTER — NURSE TRIAGE (OUTPATIENT)
Dept: INTERNAL MEDICINE | Facility: CLINIC | Age: 68
End: 2023-12-13
Payer: COMMERCIAL

## 2023-12-13 ENCOUNTER — OFFICE VISIT (OUTPATIENT)
Dept: FAMILY MEDICINE | Facility: CLINIC | Age: 68
End: 2023-12-13
Payer: COMMERCIAL

## 2023-12-13 VITALS
DIASTOLIC BLOOD PRESSURE: 69 MMHG | BODY MASS INDEX: 32.92 KG/M2 | TEMPERATURE: 97.8 F | OXYGEN SATURATION: 97 % | HEART RATE: 74 BPM | RESPIRATION RATE: 16 BRPM | WEIGHT: 180 LBS | SYSTOLIC BLOOD PRESSURE: 123 MMHG

## 2023-12-13 DIAGNOSIS — R19.7 DIARRHEA, UNSPECIFIED TYPE: Primary | ICD-10-CM

## 2023-12-13 DIAGNOSIS — R19.7 DIARRHEA, UNSPECIFIED TYPE: ICD-10-CM

## 2023-12-13 LAB

## 2023-12-13 PROCEDURE — 87493 C DIFF AMPLIFIED PROBE: CPT | Mod: 59

## 2023-12-13 PROCEDURE — 99213 OFFICE O/P EST LOW 20 MIN: CPT | Performed by: PHYSICIAN ASSISTANT

## 2023-12-13 PROCEDURE — 87507 IADNA-DNA/RNA PROBE TQ 12-25: CPT

## 2023-12-13 NOTE — PROGRESS NOTES
Assessment & Plan:      Problem List Items Addressed This Visit    None  Visit Diagnoses       Diarrhea, unspecified type    -  Primary    Relevant Orders    Enteric Bacteria and Virus Panel by [facility] Stool    C. difficile Toxin B PCR with reflex to C. difficile Antigen and Toxins A/B EIA          Medical Decision Making  Patient presents with ongoing diarrhea for 1 week.  Symptoms appear most consistent with a viral gastroenteritis.  However, given the duration of symptoms, do recommend stool testing at this time.  Patient otherwise shows no signs of dehydration.  Continue with conservative measures.  Will contact only if stool test is positive and will treat appropriately at that time only if symptoms are still persisting.  Discussed treatment and symptomatic care.  Allergies and medication interactions reviewed.  Discussed signs of worsening symptoms and when to follow-up with PCP if no symptom improvement.     Subjective:      Dandy Mejia is a 68 year old female here for evaluation of diarrhea.  Onset of symptoms was 1 week ago.  Symptoms were more severe the first couple days and then was showing good signs of improvement so patient had incontinence of stool last night.  Patient did take a dose of Imodium 2 days ago which seem to be helpful.  Patient otherwise denies fevers, body aches, sore throat, rhinorrhea, cough, nausea, and emesis.  No other family members at home with similar symptoms.     The following portions of the patient's history were reviewed and updated as appropriate: allergies, current medications, and problem list.     Review of Systems  Pertinent items are noted in HPI.    Allergies  Allergies   Allergen Reactions    Other Allergy (See Comments) [External Allergen Needs Reconciliation - See Comment] Unknown     Ointment Base External Ointment, 12/10/2007.  Action: RASH.; Ointment Base External Ointment, 12/10/2007.  Action: RASH.         Family History   Problem Relation Age of Onset     "Heart Failure Mother     Atrial fibrillation Mother     Macular Degeneration Mother     Diabetes Type 2  Father         Older-age onset of diabetes for multiple family members in father's side    Cancer Sister         \"Female cancer wih lymph node removal\"    Glaucoma Sister         Being watched for glaucoma    Depression Sister     Fibromyalgia Sister     Glaucoma Sister        Social History     Tobacco Use    Smoking status: Never    Smokeless tobacco: Never   Substance Use Topics    Alcohol use: Yes     Alcohol/week: 7.0 standard drinks of alcohol     Types: 7 Standard drinks or equivalent per week        Objective:      /69   Pulse 74   Temp 97.8  F (36.6  C) (Oral)   Resp 16   Wt 81.6 kg (180 lb)   SpO2 97%   BMI 32.92 kg/m    General appearance - alert, well appearing, and in no distress and non-toxic    The use of Dragon/[employer] dictation services was used to construct the content of this note; any grammatical errors are non-intentional. Please contact the author directly if you are in need of any clarification.     "

## 2023-12-13 NOTE — TELEPHONE ENCOUNTER
No appointments available today, disposition states she be seen today and patient wants to be seen d/t diarrhea for x1 wk with a incontinent episode last night. Coming in for [facility].    Reason for Disposition   MODERATE diarrhea (e.g., 4-6 times / day more than normal) and age > 70 years    Additional Information   Negative: Shock suspected (e.g., cold/pale/clammy skin, too weak to stand, low BP, rapid pulse)   Negative: Difficult to awaken or acting confused (e.g., disoriented, slurred speech)   Negative: Sounds like a life-threatening emergency to the triager   Negative: Vomiting also present and worse than the diarrhea   Negative: Blood in stool and without diarrhea   Negative: SEVERE abdominal pain (e.g., excruciating) and present > 1 hour   Negative: SEVERE abdominal pain and age > 60 years   Negative: Bloody, black, or tarry bowel movements  (Exception: Chronic-unchanged black-grey bowel movements and is taking iron pills or Pepto-Bismol.)   Negative: SEVERE diarrhea (e.g., 7 or more times / day more than normal) and age > 60 years   Negative: Constant abdominal pain lasting > 2 hours   Negative: Drinking very little and dehydration suspected (e.g., no urine > 12 hours, very dry mouth, very lightheaded)   Negative: Patient sounds very sick or weak to the triager   Negative: SEVERE diarrhea (e.g., 7 or more times / day more than normal) and present > 24 hours (1 day)    Protocols used: Diarrhea-A-OH

## 2023-12-17 ENCOUNTER — HOSPITAL ENCOUNTER (EMERGENCY)
Facility: HOSPITAL | Age: 68
Discharge: HOME | End: 2023-12-17
Attending: EMERGENCY MEDICINE | Admitting: EMERGENCY MEDICINE
Payer: COMMERCIAL

## 2023-12-17 VITALS
RESPIRATION RATE: 16 BRPM | HEIGHT: 61 IN | WEIGHT: 178 LBS | BODY MASS INDEX: 33.61 KG/M2 | TEMPERATURE: 98.1 F | DIASTOLIC BLOOD PRESSURE: 67 MMHG | SYSTOLIC BLOOD PRESSURE: 121 MMHG | OXYGEN SATURATION: 95 % | HEART RATE: 79 BPM

## 2023-12-17 DIAGNOSIS — E87.6 HYPOKALEMIA: ICD-10-CM

## 2023-12-17 DIAGNOSIS — R19.7 DIARRHEA, UNSPECIFIED TYPE: ICD-10-CM

## 2023-12-17 DIAGNOSIS — E86.0 DEHYDRATION: ICD-10-CM

## 2023-12-17 DIAGNOSIS — R53.1 WEAKNESS: ICD-10-CM

## 2023-12-17 LAB
ANION GAP SERPL CALCULATED.3IONS-SCNC: 10 MMOL/L (ref 7–15)
BUN SERPL-MCNC: 13.9 MG/DL (ref 8–23)
CALCIUM SERPL-MCNC: 8.3 MG/DL (ref 8.8–10.2)
CHLORIDE SERPL-SCNC: 96 MMOL/L (ref 98–107)
CREAT SERPL-MCNC: 0.76 MG/DL (ref 0.51–0.95)
DEPRECATED HCO3 PLAS-SCNC: 28 MMOL/L (ref 22–29)
EGFRCR SERPLBLD CKD-EPI 2021: 84 ML/MIN/1.73M2
ERYTHROCYTE [DISTWIDTH] IN BLOOD BY AUTOMATED COUNT: 11.9 % (ref 10–15)
GLUCOSE SERPL-MCNC: 107 MG/DL (ref 70–99)
HCT VFR BLD AUTO: 40.2 % (ref 35–47)
HGB BLD-MCNC: 13.6 G/DL (ref 11.7–15.7)
MAGNESIUM SERPL-MCNC: 1.8 MG/DL (ref 1.7–2.3)
MCH RBC QN AUTO: 31.6 PG (ref 26.5–33)
MCHC RBC AUTO-ENTMCNC: 33.8 G/DL (ref 31.5–36.5)
MCV RBC AUTO: 93 FL (ref 78–100)
PLATELET # BLD AUTO: 357 10E3/UL (ref 150–450)
POTASSIUM SERPL-SCNC: 3.1 MMOL/L (ref 3.4–5.3)
RBC # BLD AUTO: 4.31 10E6/UL (ref 3.8–5.2)
SODIUM SERPL-SCNC: 134 MMOL/L (ref 135–145)
WBC # BLD AUTO: 9.2 10E3/UL (ref 4–11)

## 2023-12-17 PROCEDURE — 250N000011 HC RX IP 250 OP 636: Mod: JZ | Performed by: EMERGENCY MEDICINE

## 2023-12-17 PROCEDURE — 36415 COLL VENOUS BLD VENIPUNCTURE: CPT | Performed by: STUDENT IN AN ORGANIZED HEALTH CARE EDUCATION/TRAINING PROGRAM

## 2023-12-17 PROCEDURE — 250N000013 HC RX MED GY IP 250 OP 250 PS 637: Performed by: EMERGENCY MEDICINE

## 2023-12-17 PROCEDURE — 80048 BASIC METABOLIC PNL TOTAL CA: CPT | Performed by: STUDENT IN AN ORGANIZED HEALTH CARE EDUCATION/TRAINING PROGRAM

## 2023-12-17 PROCEDURE — 96365 THER/PROPH/DIAG IV INF INIT: CPT | Mod: 59

## 2023-12-17 PROCEDURE — 80048 BASIC METABOLIC PNL TOTAL CA: CPT | Performed by: EMERGENCY MEDICINE

## 2023-12-17 PROCEDURE — 85027 COMPLETE CBC AUTOMATED: CPT | Performed by: EMERGENCY MEDICINE

## 2023-12-17 PROCEDURE — 96361 HYDRATE IV INFUSION ADD-ON: CPT

## 2023-12-17 PROCEDURE — 258N000003 HC RX IP 258 OP 636: Performed by: EMERGENCY MEDICINE

## 2023-12-17 PROCEDURE — 99284 EMERGENCY DEPT VISIT MOD MDM: CPT | Mod: 25

## 2023-12-17 PROCEDURE — 36415 COLL VENOUS BLD VENIPUNCTURE: CPT | Performed by: EMERGENCY MEDICINE

## 2023-12-17 PROCEDURE — 83735 ASSAY OF MAGNESIUM: CPT | Performed by: EMERGENCY MEDICINE

## 2023-12-17 RX ORDER — POTASSIUM CHLORIDE 1500 MG/1
20 TABLET, EXTENDED RELEASE ORAL 2 TIMES DAILY
Qty: 6 TABLET | Refills: 0 | Status: SHIPPED | OUTPATIENT
Start: 2023-12-17 | End: 2023-12-20

## 2023-12-17 RX ORDER — ONDANSETRON 4 MG/1
4 TABLET, ORALLY DISINTEGRATING ORAL ONCE
Status: DISCONTINUED | OUTPATIENT
Start: 2023-12-17 | End: 2023-12-17

## 2023-12-17 RX ORDER — POTASSIUM CHLORIDE 7.45 MG/ML
10 INJECTION INTRAVENOUS ONCE
Status: COMPLETED | OUTPATIENT
Start: 2023-12-17 | End: 2023-12-17

## 2023-12-17 RX ORDER — POTASSIUM CHLORIDE 1500 MG/1
40 TABLET, EXTENDED RELEASE ORAL ONCE
Status: COMPLETED | OUTPATIENT
Start: 2023-12-17 | End: 2023-12-17

## 2023-12-17 RX ADMIN — POTASSIUM CHLORIDE 40 MEQ: 1500 TABLET, EXTENDED RELEASE ORAL at 15:47

## 2023-12-17 RX ADMIN — SODIUM CHLORIDE 1000 ML: 9 INJECTION, SOLUTION INTRAVENOUS at 15:49

## 2023-12-17 RX ADMIN — POTASSIUM CHLORIDE 10 MEQ: 7.46 INJECTION, SOLUTION INTRAVENOUS at 15:49

## 2023-12-17 ASSESSMENT — ACTIVITIES OF DAILY LIVING (ADL): ADLS_ACUITY_SCORE: 35

## 2023-12-17 NOTE — DISCHARGE INSTRUCTIONS
You were seen in the Emergency Department today for evaluation of weakness.  Your lab work showed low potassium . You were prescribed potassium to take twice a day for 3 days.  You can try Imodium again and see if it starts to help.  You should take all medications as prescribed.  Follow up with your primary care physician to ensure resolution of symptoms. Return if you have new or worsening symptoms.

## 2023-12-17 NOTE — ED TRIAGE NOTES
Pt presents with 2 weeks of diarrhea, went to PMD last Wednesday and stools were negative. Diarrhea persists, pt feels weak. No n/v, no fevers/chills , no cp/sob     Triage Assessment (Adult)       Row Name 12/17/23 [id number]          Triage Assessment    Airway WDL WDL        Respiratory WDL    Respiratory WDL WDL        Skin Circulation/Temperature WDL    Skin Circulation/Temperature WDL WDL        Cardiac WDL    Cardiac WDL WDL        Peripheral/Neurovascular WDL    Peripheral Neurovascular WDL WDL        Cognitive/Neuro/Behavioral WDL    Cognitive/Neuro/Behavioral WDL WDL

## 2023-12-17 NOTE — ED PROVIDER NOTES
EMERGENCY DEPARTMENT ENCOUNTER      NAME: Dandy Mejia  AGE: 68 year old female  YOB: 1955  MRN: [id number]  EVALUATION DATE & TIME: No admission date for patient encounter.    PCP: Suman Doshi    ED PROVIDER: Symone Nayak M.D.      Chief Complaint   Patient presents with    Diarrhea     FINAL IMPRESSION:  1. Weakness    2. Diarrhea, unspecified type    3. Dehydration    4. Hypokalemia        ED COURSE & MEDICAL DECISION MAKING:    Pertinent Labs & Imaging studies reviewed. (See chart for details)  ED Course as of 12/17/23 2228   Sun Dec 17, 2023   1530 Patient is a 68-year-old female that comes in today for evaluation of some generalized weakness that started the last day or 2.  She had been having diarrhea for about 2 weeks now.  She not having any blood in the stool.  She followed up in walk-in clinic and they did stool studies and they were negative.  She  did take some Imodium initially but not taking it now.  She has been trying to eat and drink okay.  She is not having any abdominal pain.  She is not having any fevers or chills.  She takes losartan for hypertension and is supposed to take hydrochlorothiazide but held it because of the diarrhea.  She is able to ambulate.  She feels like the weakness is diffuse and not one-sided.  So far we have a potassium back that is 3.1 so I will order some oral and IV potassium.  I am can add on a magnesium and a CBC as well.  She was initially hypotensive although that came up on its own.  I will order some IV fluids for her and see if we get her feeling little bit better.  She has no abdominal tenderness so I do not think that we need to CT imaging right now.  I discussed that with her and she agreed with the plan.   1702 Magnesium is 1.8.  Potassium is a little bit low at 3.1.  She was not tolerating any IV potassium well.   1732 I checked back in on the patient.  I am going to ambulate her after her fluids and potassium are complete and then we  we will hopefully be able to get her discharged home.  We can send her with a few days of potassium.  She is comfortable with the plan.   1802 Patient ambulated well and is feeling much better.  Will get her discharged home.     3:20 PM I met with the patient and performed the physical exam.   5:29 PM I rechecked and updated the patient.  6:02 PM Patient is ambulating well. We discussed the plan for discharge and the patient is agreeable. Reviewed supportive cares, symptomatic treatment, outpatient follow up, and reasons to return to the Emergency Department. Patient to be discharged by ED RN.      Medical Decision Making    History:  Supplemental history from: N/A  External Record(s) reviewed: I reviewed the note from [facility] on 12/13/23    Work Up:  Emergent/Severe conditions considered and evaluated for: Electrolyte abnormality, dehydration, renal failure  I independently reviewed and interpreted none  In additional to work up documented, I considered the following work up: None  Medications given that require intensive monitoring for toxicity: IV potassium    External consultation:  Discussion of management with another provider: None    Complicating factors:  Care impacted by chronic illness: HTN  Care affected by social determinants of health: None    Disposition considerations: Considered admission but patient was feeling quite a bit better and was able to ambulate safely.  Prescriptions considered/prescribed: Potassium    At the conclusion of the encounter I discussed  the results of all of the tests and the disposition with patient.   All questions were answered.  The patient acknowledged understanding and was involved in the decision making regarding the overall care plan.      I discussed with patient the utility, limitations and findings of the exam/interventions/studies done during this visit as well as the list of differential diagnosis and symptoms to monitor/return  to ER for.  Additional verbal discharge instructions were provided.     MEDICATIONS GIVEN IN THE EMERGENCY:  Medications   sodium chloride 0.9% BOLUS 1,000 mL (0 mLs Intravenous Stopped 12/17/23 1745)   potassium chloride ER (KLOR-CON M) CR tablet 40 mEq (40 mEq Oral $Given 12/17/23 4005)   potassium chloride 10 mEq in 100 mL sterile water infusion (0 mEq Intravenous Stopped 12/17/23 1745)       NEW PRESCRIPTIONS STARTED AT TODAY'S ER VISIT  Discharge Medication List as of 12/17/2023  6:35 PM        START taking these medications    Details   potassium chloride ER (KLOR-CON M) 20 MEQ CR tablet Take 1 tablet (20 mEq) by mouth 2 times daily for 3 days, Disp-6 tablet, R-0, E-Prescribe                =================================================================    HPI    Triage Note: Pt presents with 2 weeks of diarrhea, went to PMD last Wednesday and stools were negative. Diarrhea persists, pt feels weak. No n/v, no fevers/chills , no cp/sob     Triage Assessment (Adult)       Row Name 12/17/23 9986          Triage Assessment    Airway WDL WDL        Respiratory WDL    Respiratory WDL WDL        Skin Circulation/Temperature WDL    Skin Circulation/Temperature WDL WDL        Cardiac WDL    Cardiac WDL WDL        Peripheral/Neurovascular WDL    Peripheral Neurovascular WDL WDL        Cognitive/Neuro/Behavioral WDL    Cognitive/Neuro/Behavioral WDL WDL                   Patient information was obtained from: Patient    Use of : N/A     Per chart review, patient was seen at [facility] on 12/13/23 for diarrhea. Patient was negative for C. Diff and had a negative enteric bacteria and virus panel. She was then sent home with no prescriptions.       Dandy Mejia is a 68 year old female who presents with diarrhea. She says the diarrhea started 2 weeks ago without much change. Patient reports coming in today because of new general weakness. She says she tried amodium without  relief and recently stopped taking her hydrochlorothiazide without relief. Otherwise, patient denied any blood in stool, pain, fever, chills, chest pain, or shortness of breath. No other complaints at this time.     PAST MEDICAL HISTORY:  History reviewed. No pertinent past medical history.    PAST SURGICAL HISTORY:  Past Surgical History:   Procedure Laterality Date    CHOLECYSTECTOMY      GASTRIC BYPASS  1980    HYSTERECTOMY TOTAL ABDOMINAL  1993    RELEASE CARPAL TUNNEL Right     TONSILLECTOMY      TUBAL LIGATION         CURRENT MEDICATIONS:      Current Facility-Administered Medications:     cyanocobalamin injection 1,000 mcg, 1,000 mcg, Intramuscular, Q30 Days, Suman Doshi MD, 1,000 mcg at 10/02/23 1104    cyanocobalamin injection 1,000 mcg, 1,000 mcg, Intramuscular, Q30 Days, Suman Doshi MD, 1,000 mcg at 12/04/23 1105    Current Outpatient Medications:     potassium chloride ER (KLOR-CON M) 20 MEQ CR tablet, Take 1 tablet (20 mEq) by mouth 2 times daily for 3 days, Disp: 6 tablet, Rfl: 0    aspirin 81 MG EC tablet, [ASPIRIN 81 MG EC TABLET] Take 81 mg by mouth daily., Disp: , Rfl:     Calcium Carbonate-Vit D-Min (CALCIUM 1200 PO), , Disp: , Rfl:     cholecalciferol, vitamin D3, 5,000 unit Tab, [CHOLECALCIFEROL, VITAMIN D3, 5,000 UNIT TAB] Take 1 tablet by mouth daily. , Disp: , Rfl:     ferrous sulfate 325 (65 FE) MG tablet, [FERROUS SULFATE 325 (65 FE) MG TABLET] Take 325 mg by mouth., Disp: , Rfl:     fish oil-omega-3 fatty acids 1000 MG capsule, Take 2 g by mouth daily, Disp: , Rfl:     hydrochlorothiazide (HYDRODIURIL) 25 MG tablet, Take 1 tablet (25 mg) by mouth daily, Disp: 90 tablet, Rfl: 3    losartan (COZAAR) 25 MG tablet, Take 1 tablet (25 mg) by mouth 2 times daily, Disp: 180 tablet, Rfl: 3    Probiotic Product (DAILY DIGESTIVE PROBIOTIC PO), , Disp: , Rfl:     triamcinolone (KENALOG) 0.1 % cream, [TRIAMCINOLONE (KENALOG) 0.1 % CREAM] Apply to affected areas 3 times a day for 2 weeks, Disp:  "45 g, Rfl: 1    ALLERGIES:  Allergies   Allergen Reactions    Other Allergy (See Comments) [External Allergen Needs Reconciliation - See Comment] Unknown     Ointment Base External Ointment, 12/10/2007.  Action: RASH.; Ointment Base External Ointment, 12/10/2007.  Action: RASH.         FAMILY HISTORY:  Family History   Problem Relation Age of Onset    Heart Failure Mother     Atrial fibrillation Mother     Macular Degeneration Mother     Diabetes Type 2  Father         Older-age onset of diabetes for multiple family members in father's side    Cancer Sister         \"Female cancer wih lymph node removal\"    Glaucoma Sister         Being watched for glaucoma    Depression Sister     Fibromyalgia Sister     Glaucoma Sister        SOCIAL HISTORY:   Social History     Socioeconomic History    Marital status:    Tobacco Use    Smoking status: Never    Smokeless tobacco: Never   Vaping Use    Vaping Use: Never used   Substance and Sexual Activity    Alcohol use: Yes     Alcohol/week: 7.0 standard drinks of alcohol     Types: 7 Standard drinks or equivalent per week    Drug use: No    Sexual activity: Never     Partners: Male       PHYSICAL EXAM    VITAL SIGNS: /67   Pulse 79   Temp 98.1  F (36.7  C) (Temporal)   Resp 16   Ht 1.549 m (5' 1\")   Wt 80.7 kg (178 lb)   SpO2 95%   BMI 33.63 kg/m     GENERAL: Awake, alert, answering questions appropriately  SPEECH:  Easy to understand speech, Normal volume and rick  PULMONARY: No respiratory distress, Lungs clear to auscultation bilaterally  CARDIOVASCULAR: Regular rate and rhythm, Distal pulses present and normal.  ABDOMINAL: Soft, Nondistended, Nontender, No rebound or guarding, No palpable masses  EXTREMITIES: No lower extremity edema.  PSYCH: Normal mood and affect     LAB:  All pertinent labs reviewed and interpreted.  Results for orders placed or performed during the hospital encounter of 12/17/23   Basic metabolic panel   Result Value Ref Range    " Sodium 134 (L) 135 - 145 mmol/L    Potassium 3.1 (L) 3.4 - 5.3 mmol/L    Chloride 96 (L) 98 - 107 mmol/L    Carbon Dioxide (CO2) 28 22 - 29 mmol/L    Anion Gap 10 7 - 15 mmol/L    Urea Nitrogen 13.9 8.0 - 23.0 mg/dL    Creatinine 0.76 0.51 - 0.95 mg/dL    GFR Estimate 84 >60 mL/min/1.73m2    Calcium 8.3 (L) 8.8 - 10.2 mg/dL    Glucose 107 (H) 70 - 99 mg/dL   Result Value Ref Range    Magnesium 1.8 1.7 - 2.3 mg/dL   CBC (+ platelets, no diff)   Result Value Ref Range    WBC Count 9.2 4.0 - 11.0 10e3/uL    RBC Count 4.31 3.80 - 5.20 10e6/uL    Hemoglobin 13.6 11.7 - 15.7 g/dL    Hematocrit 40.2 35.0 - 47.0 %    MCV 93 78 - 100 fL    MCH 31.6 26.5 - 33.0 pg    MCHC 33.8 31.5 - 36.5 g/dL    RDW 11.9 10.0 - 15.0 %    Platelet Count 357 150 - 450 10e3/uL       I, Katina Mathis, am serving as a scribe to document services personally performed by Dr. Nayak based on my observation and the provider's statements to me. I, Symone Nayak MD attest that Katina Mathis is acting in a scribe capacity, has observed my performance of the services and has documented them in accordance with my direction.    Symone Nayak M.D.  Emergency Medicine  [facility] EMERGENCY DEPARTMENT  [address] [id number] [phone]  Dept: [phone]       Symone Nayak MD  12/17/23 8172

## 2023-12-22 ENCOUNTER — APPOINTMENT (OUTPATIENT)
Dept: CT IMAGING | Facility: HOSPITAL | Age: 68
DRG: 175 | End: 2023-12-22
Attending: FAMILY MEDICINE
Payer: COMMERCIAL

## 2023-12-22 ENCOUNTER — HOSPITAL ENCOUNTER (INPATIENT)
Facility: HOSPITAL | Age: 68
LOS: 2 days | Discharge: HOME | DRG: 175 | End: 2023-12-24
Attending: FAMILY MEDICINE | Admitting: INTERNAL MEDICINE
Payer: COMMERCIAL

## 2023-12-22 ENCOUNTER — APPOINTMENT (OUTPATIENT)
Dept: ULTRASOUND IMAGING | Facility: HOSPITAL | Age: 68
DRG: 175 | End: 2023-12-22
Attending: FAMILY MEDICINE
Payer: COMMERCIAL

## 2023-12-22 DIAGNOSIS — E78.5 HYPERLIPIDEMIA LDL GOAL <100: ICD-10-CM

## 2023-12-22 DIAGNOSIS — I26.09 ACUTE PULMONARY EMBOLISM WITH ACUTE COR PULMONALE, UNSPECIFIED PULMONARY EMBOLISM TYPE (H): Primary | ICD-10-CM

## 2023-12-22 DIAGNOSIS — R06.09 DYSPNEA ON EXERTION: ICD-10-CM

## 2023-12-22 LAB
ALBUMIN SERPL BCG-MCNC: 2.5 G/DL (ref 3.5–5.2)
ALP SERPL-CCNC: 68 U/L (ref 40–150)
ALT SERPL W P-5'-P-CCNC: 22 U/L (ref 0–50)
ANION GAP SERPL CALCULATED.3IONS-SCNC: 6 MMOL/L (ref 7–15)
APTT PPP: 29 SECONDS (ref 22–38)
AST SERPL W P-5'-P-CCNC: 18 U/L (ref 0–45)
BASE EXCESS BLDV CALC-SCNC: 5.7 MMOL/L
BASOPHILS # BLD AUTO: 0 10E3/UL (ref 0–0.2)
BASOPHILS NFR BLD AUTO: 1 %
BILIRUB SERPL-MCNC: 0.2 MG/DL
BUN SERPL-MCNC: 14.1 MG/DL (ref 8–23)
CALCIUM SERPL-MCNC: 8.1 MG/DL (ref 8.8–10.2)
CHLORIDE SERPL-SCNC: 99 MMOL/L (ref 98–107)
CREAT SERPL-MCNC: 0.8 MG/DL (ref 0.51–0.95)
DEPRECATED HCO3 PLAS-SCNC: 30 MMOL/L (ref 22–29)
EGFRCR SERPLBLD CKD-EPI 2021: 79 ML/MIN/1.73M2
EOSINOPHIL # BLD AUTO: 0.2 10E3/UL (ref 0–0.7)
EOSINOPHIL NFR BLD AUTO: 4 %
ERYTHROCYTE [DISTWIDTH] IN BLOOD BY AUTOMATED COUNT: 12.3 % (ref 10–15)
FLUAV RNA SPEC QL NAA+PROBE: NEGATIVE
FLUBV RNA RESP QL NAA+PROBE: NEGATIVE
GLUCOSE SERPL-MCNC: 97 MG/DL (ref 70–99)
HCO3 BLDV-SCNC: 31 MMOL/L (ref 24–30)
HCT VFR BLD AUTO: 40 % (ref 35–47)
HGB BLD-MCNC: 13.4 G/DL (ref 11.7–15.7)
HOLD SPECIMEN: NORMAL
IMM GRANULOCYTES # BLD: 0 10E3/UL
IMM GRANULOCYTES NFR BLD: 0 %
LYMPHOCYTES # BLD AUTO: 0.9 10E3/UL (ref 0.8–5.3)
LYMPHOCYTES NFR BLD AUTO: 14 %
MAGNESIUM SERPL-MCNC: 1.7 MG/DL (ref 1.7–2.3)
MCH RBC QN AUTO: 31.5 PG (ref 26.5–33)
MCHC RBC AUTO-ENTMCNC: 33.5 G/DL (ref 31.5–36.5)
MCV RBC AUTO: 94 FL (ref 78–100)
MONOCYTES # BLD AUTO: 0.6 10E3/UL (ref 0–1.3)
MONOCYTES NFR BLD AUTO: 9 %
NEUTROPHILS # BLD AUTO: 4.6 10E3/UL (ref 1.6–8.3)
NEUTROPHILS NFR BLD AUTO: 72 %
NRBC # BLD AUTO: 0 10E3/UL
NRBC BLD AUTO-RTO: 0 /100
NT-PROBNP SERPL-MCNC: 387 PG/ML (ref 0–900)
OXYHGB MFR BLDV: 32.5 % (ref 70–75)
PCO2 BLDV: 54 MM HG (ref 35–50)
PH BLDV: 7.37 [PH] (ref 7.35–7.45)
PLATELET # BLD AUTO: 264 10E3/UL (ref 150–450)
PO2 BLDV: 23 MM HG (ref 25–47)
POTASSIUM SERPL-SCNC: 3.9 MMOL/L (ref 3.4–5.3)
PROT SERPL-MCNC: 4.4 G/DL (ref 6.4–8.3)
RADIOLOGIST FLAGS: ABNORMAL
RBC # BLD AUTO: 4.25 10E6/UL (ref 3.8–5.2)
RSV RNA SPEC NAA+PROBE: NEGATIVE
SAO2 % BLDV: 32.8 % (ref 70–75)
SARS-COV-2 RNA RESP QL NAA+PROBE: NEGATIVE
SODIUM SERPL-SCNC: 135 MMOL/L (ref 135–145)
TROPONIN T SERPL HS-MCNC: 101 NG/L
TROPONIN T SERPL HS-MCNC: 127 NG/L
TROPONIN T SERPL HS-MCNC: 46 NG/L
TSH SERPL DL<=0.005 MIU/L-ACNC: 1.7 UIU/ML (ref 0.3–4.2)
UFH PPP CHRO-ACNC: 1.09 IU/ML
WBC # BLD AUTO: 6.4 10E3/UL (ref 4–11)

## 2023-12-22 PROCEDURE — 99223 1ST HOSP IP/OBS HIGH 75: CPT | Performed by: INTERNAL MEDICINE

## 2023-12-22 PROCEDURE — 82040 ASSAY OF SERUM ALBUMIN: CPT | Performed by: EMERGENCY MEDICINE

## 2023-12-22 PROCEDURE — 36415 COLL VENOUS BLD VENIPUNCTURE: CPT | Performed by: EMERGENCY MEDICINE

## 2023-12-22 PROCEDURE — 250N000013 HC RX MED GY IP 250 OP 250 PS 637: Performed by: INTERNAL MEDICINE

## 2023-12-22 PROCEDURE — 71275 CT ANGIOGRAPHY CHEST: CPT

## 2023-12-22 PROCEDURE — 36415 COLL VENOUS BLD VENIPUNCTURE: CPT | Performed by: FAMILY MEDICINE

## 2023-12-22 PROCEDURE — 84484 ASSAY OF TROPONIN QUANT: CPT | Performed by: EMERGENCY MEDICINE

## 2023-12-22 PROCEDURE — 84443 ASSAY THYROID STIM HORMONE: CPT | Performed by: EMERGENCY MEDICINE

## 2023-12-22 PROCEDURE — 83880 ASSAY OF NATRIURETIC PEPTIDE: CPT | Performed by: EMERGENCY MEDICINE

## 2023-12-22 PROCEDURE — 250N000011 HC RX IP 250 OP 636: Mod: JZ | Performed by: FAMILY MEDICINE

## 2023-12-22 PROCEDURE — 87637 SARSCOV2&INF A&B&RSV AMP PRB: CPT | Performed by: FAMILY MEDICINE

## 2023-12-22 PROCEDURE — 83735 ASSAY OF MAGNESIUM: CPT | Performed by: EMERGENCY MEDICINE

## 2023-12-22 PROCEDURE — 84484 ASSAY OF TROPONIN QUANT: CPT | Performed by: INTERNAL MEDICINE

## 2023-12-22 PROCEDURE — 85520 HEPARIN ASSAY: CPT | Performed by: FAMILY MEDICINE

## 2023-12-22 PROCEDURE — 85730 THROMBOPLASTIN TIME PARTIAL: CPT | Performed by: FAMILY MEDICINE

## 2023-12-22 PROCEDURE — 82805 BLOOD GASES W/O2 SATURATION: CPT | Performed by: EMERGENCY MEDICINE

## 2023-12-22 PROCEDURE — 99291 CRITICAL CARE FIRST HOUR: CPT | Mod: 25

## 2023-12-22 PROCEDURE — 93970 EXTREMITY STUDY: CPT

## 2023-12-22 PROCEDURE — 85025 COMPLETE CBC W/AUTO DIFF WBC: CPT | Performed by: EMERGENCY MEDICINE

## 2023-12-22 PROCEDURE — 93005 ELECTROCARDIOGRAM TRACING: CPT | Performed by: EMERGENCY MEDICINE

## 2023-12-22 PROCEDURE — 120N000001 HC R&B MED SURG/OB

## 2023-12-22 PROCEDURE — 84484 ASSAY OF TROPONIN QUANT: CPT | Performed by: FAMILY MEDICINE

## 2023-12-22 RX ORDER — HYDROCHLOROTHIAZIDE 25 MG/1
25 TABLET ORAL DAILY
Status: DISCONTINUED | OUTPATIENT
Start: 2023-12-23 | End: 2023-12-24

## 2023-12-22 RX ORDER — LOSARTAN POTASSIUM 25 MG/1
25 TABLET ORAL 2 TIMES DAILY
Status: DISCONTINUED | OUTPATIENT
Start: 2023-12-22 | End: 2023-12-24 | Stop reason: HOSPADM

## 2023-12-22 RX ORDER — IOPAMIDOL 755 MG/ML
90 INJECTION, SOLUTION INTRAVASCULAR ONCE
Status: COMPLETED | OUTPATIENT
Start: 2023-12-22 | End: 2023-12-22

## 2023-12-22 RX ORDER — ONDANSETRON 2 MG/ML
4 INJECTION INTRAMUSCULAR; INTRAVENOUS EVERY 6 HOURS PRN
Status: DISCONTINUED | OUTPATIENT
Start: 2023-12-22 | End: 2023-12-24 | Stop reason: HOSPADM

## 2023-12-22 RX ORDER — COLD-HOT PACK
EACH MISCELLANEOUS DAILY
Status: DISCONTINUED | OUTPATIENT
Start: 2023-12-22 | End: 2023-12-22

## 2023-12-22 RX ORDER — HEPARIN SODIUM 10000 [USP'U]/100ML
0-5000 INJECTION, SOLUTION INTRAVENOUS CONTINUOUS
Status: DISCONTINUED | OUTPATIENT
Start: 2023-12-22 | End: 2023-12-24

## 2023-12-22 RX ORDER — AMOXICILLIN 250 MG
2 CAPSULE ORAL 2 TIMES DAILY PRN
Status: DISCONTINUED | OUTPATIENT
Start: 2023-12-22 | End: 2023-12-24 | Stop reason: HOSPADM

## 2023-12-22 RX ORDER — LIDOCAINE 40 MG/G
CREAM TOPICAL
Status: DISCONTINUED | OUTPATIENT
Start: 2023-12-22 | End: 2023-12-24 | Stop reason: HOSPADM

## 2023-12-22 RX ORDER — CALCIUM CARBONATE 500 MG/1
1000 TABLET, CHEWABLE ORAL 4 TIMES DAILY PRN
Status: DISCONTINUED | OUTPATIENT
Start: 2023-12-22 | End: 2023-12-24 | Stop reason: HOSPADM

## 2023-12-22 RX ORDER — ACETAMINOPHEN 500 MG
TABLET ORAL 2 TIMES DAILY
Status: DISCONTINUED | OUTPATIENT
Start: 2023-12-22 | End: 2023-12-22

## 2023-12-22 RX ORDER — ACETAMINOPHEN 650 MG/1
650 SUPPOSITORY RECTAL EVERY 4 HOURS PRN
Status: DISCONTINUED | OUTPATIENT
Start: 2023-12-22 | End: 2023-12-24 | Stop reason: HOSPADM

## 2023-12-22 RX ORDER — AMOXICILLIN 250 MG
1 CAPSULE ORAL 2 TIMES DAILY PRN
Status: DISCONTINUED | OUTPATIENT
Start: 2023-12-22 | End: 2023-12-24 | Stop reason: HOSPADM

## 2023-12-22 RX ORDER — ACETAMINOPHEN 325 MG/1
650 TABLET ORAL EVERY 4 HOURS PRN
Status: DISCONTINUED | OUTPATIENT
Start: 2023-12-22 | End: 2023-12-24 | Stop reason: HOSPADM

## 2023-12-22 RX ORDER — FERROUS SULFATE 325(65) MG
325 TABLET ORAL DAILY
Status: DISCONTINUED | OUTPATIENT
Start: 2023-12-22 | End: 2023-12-24 | Stop reason: HOSPADM

## 2023-12-22 RX ORDER — ONDANSETRON 4 MG/1
4 TABLET, ORALLY DISINTEGRATING ORAL EVERY 6 HOURS PRN
Status: DISCONTINUED | OUTPATIENT
Start: 2023-12-22 | End: 2023-12-24 | Stop reason: HOSPADM

## 2023-12-22 RX ORDER — ASPIRIN 81 MG/1
81 TABLET ORAL DAILY
Status: DISCONTINUED | OUTPATIENT
Start: 2023-12-22 | End: 2023-12-24 | Stop reason: HOSPADM

## 2023-12-22 RX ADMIN — LOSARTAN POTASSIUM 25 MG: 25 TABLET, FILM COATED ORAL at 20:43

## 2023-12-22 RX ADMIN — Medication 125 MCG: at 17:00

## 2023-12-22 RX ADMIN — Medication 81 MG: at 17:00

## 2023-12-22 RX ADMIN — HEPARIN SODIUM 1300 UNITS/HR: 10000 INJECTION, SOLUTION INTRAVENOUS at 21:50

## 2023-12-22 RX ADMIN — HEPARIN SODIUM 1500 UNITS/HR: 10000 INJECTION, SOLUTION INTRAVENOUS at 14:22

## 2023-12-22 RX ADMIN — IOPAMIDOL 90 ML: 755 INJECTION, SOLUTION INTRAVENOUS at 13:31

## 2023-12-22 RX ADMIN — FERROUS SULFATE TAB 325 MG (65 MG ELEMENTAL FE) 325 MG: 325 (65 FE) TAB at 17:00

## 2023-12-22 ASSESSMENT — ACTIVITIES OF DAILY LIVING (ADL)
ADLS_ACUITY_SCORE: 35
ADLS_ACUITY_SCORE: 35
ADLS_ACUITY_SCORE: 22
ADLS_ACUITY_SCORE: 33

## 2023-12-22 ASSESSMENT — ENCOUNTER SYMPTOMS
VOMITING: 0
COUGH: 0
SHORTNESS OF BREATH: 1

## 2023-12-22 NOTE — PLAN OF CARE
Pt arrived to floor around 1500 from ED. Pt alert and oriented. Walked to bed. Steady gait. Stand by assist to bathroom to help with IV and O2 tubing. Slight SOB when walking to bathroom. Recovered quickly. On 2L NC. LS clear, diminished. Denies pain. Heparin running and started in ED. Heparin running at 1500 unit(s)/hr, 15 ml/hr verified. Next anti XA scheduled for 1954.

## 2023-12-22 NOTE — MEDICATION SCRIBE - ADMISSION MEDICATION HISTORY
Medication Scribe Admission Medication History    Admission medication history is complete. The information provided in this note is only as accurate as the sources available at the time of the update.    Information Source(s): Patient and CareEverywhere/SureScripts via in-person    Pertinent Information: pt states currently still using kenalog cream prn  Pt states completed potassium    Changes made to PTA medication list:  Added: None  Deleted: None  Changed: None    Medication Affordability:  Not including over the counter (OTC) medications, was there a time in the past 3 months when you did not take your medications as prescribed because of cost?: No    Allergies reviewed with patient and updates made in EHR: yes    Medication History Completed By: MARINE LÓPEZ 12/22/2023 2:13 PM    Current Facility-Administered Medications for the 12/22/23 encounter (Hospital Encounter)   Medication    cyanocobalamin injection 1,000 mcg    cyanocobalamin injection 1,000 mcg     PTA Med List   Medication Sig Last Dose    aspirin 81 MG EC tablet [ASPIRIN 81 MG EC TABLET] Take 81 mg by mouth daily. 12/21/2023 at pm    Calcium Carbonate-Vit D-Min (CALCIUM 1200 PO) Take 1 tablet by mouth 2 times daily 12/22/2023 at am    cholecalciferol, vitamin D3, 5,000 unit Tab [CHOLECALCIFEROL, VITAMIN D3, 5,000 UNIT TAB] Take 1 tablet by mouth daily.  12/21/2023 at pm    ferrous sulfate 325 (65 FE) MG tablet [FERROUS SULFATE 325 (65 FE) MG TABLET] Take 325 mg by mouth. 12/21/2023 at pm    fish oil-omega-3 fatty acids 1000 MG capsule Take 2 g by mouth daily 12/21/2023 at pm    hydrochlorothiazide (HYDRODIURIL) 25 MG tablet Take 1 tablet (25 mg) by mouth daily 12/22/2023 at am    losartan (COZAAR) 25 MG tablet Take 1 tablet (25 mg) by mouth 2 times daily 12/22/2023 at am    Probiotic Product (DAILY DIGESTIVE PROBIOTIC PO) Take 1 capsule by mouth daily 12/22/2023 at am    triamcinolone (KENALOG) 0.1 % cream [TRIAMCINOLONE (KENALOG) 0.1 %  CREAM] Apply to affected areas 3 times a day for 2 weeks (Patient taking differently: Apply topically 3 times daily as needed for irritation) More than a month at pt states using prn

## 2023-12-22 NOTE — CONSULTS
Interventional Radiology - CHART REVIEW Consultation Note:  Inpatient - [facility]  12/22/2023     Reason for Consult:  PE    Requesting Provider:  Dr. Stallings    HPI:  Dandy Mejia is a 68 year old female with PMHx HTN and remote history RLE DVT. Presented to ED today 12/22 for SOB since 12/18. Also with some chest discomfort and slight leg swelling. CT PE study completed in ED which showed moderate burden bilateral PE. Troponin elevated and BNP slightly elevated also.     IR consulted for potential intervention given PE presence.     IMAGING:    CT read pending      PAST MEDICAL HISTORY:  History reviewed. No pertinent past medical history.    PAST SURGICAL HISTORY:  Past Surgical History:   Procedure Laterality Date    CHOLECYSTECTOMY      GASTRIC BYPASS  1980    HYSTERECTOMY TOTAL ABDOMINAL  1993    RELEASE CARPAL TUNNEL Right     TONSILLECTOMY      TUBAL LIGATION         ALLERGIES:  Allergies   Allergen Reactions    Other Allergy (See Comments) [External Allergen Needs Reconciliation - See Comment] Unknown     Ointment Base External Ointment, 12/10/2007.  Action: RASH.; Ointment Base External Ointment, 12/10/2007.  Action: RASH.          MEDICATIONS:  Current Facility-Administered Medications   Medication    cyanocobalamin injection 1,000 mcg    cyanocobalamin injection 1,000 mcg    heparin ANTICOAGULANT Loading dose for HIGH INTENSITY TREATMENT * Give BEFORE starting heparin infusion    heparin infusion 25,000 units in D5W 250 mL ANTICOAGULANT     Current Outpatient Medications   Medication    aspirin 81 MG EC tablet    Calcium Carbonate-Vit D-Min (CALCIUM 1200 PO)    cholecalciferol, vitamin D3, 5,000 unit Tab    ferrous sulfate 325 (65 FE) MG tablet    fish oil-omega-3 fatty acids 1000 MG capsule    hydrochlorothiazide (HYDRODIURIL) 25 MG tablet    losartan (COZAAR) 25 MG tablet    Probiotic Product (DAILY DIGESTIVE PROBIOTIC PO)    triamcinolone (KENALOG) 0.1 % cream     "    LABS:  No results found for: \"INR\"   Hemoglobin   Date Value Ref Range Status   12/22/2023 13.4 11.7 - 15.7 g/dL Final     Platelet Count   Date Value Ref Range Status   12/22/2023 264 150 - 450 10e3/uL Final     Creatinine   Date Value Ref Range Status   12/22/2023 0.80 0.51 - 0.95 mg/dL Final     Potassium   Date Value Ref Range Status   12/22/2023 3.9 3.4 - 5.3 mmol/L Final   10/21/2021   Final     Comment:     Specimen hemolyzed, result invalid         EXAM:  /69   Pulse 85   Temp 97.8  F (36.6  C) (Oral)   Resp 19   Ht 1.549 m (5' 1\")   Wt 83.9 kg (185 lb)   SpO2 92%   BMI 34.96 kg/m        ASSESSMENT:  69yo female who presented with SOB and CT revealing bilateral PE    PLAN:    Imaging reviewed by Dr. Wayne (IR) - PE clot burden is too segmental at this point to consider IR intervention for thrombectomy.    Recommend proceeding with conservative management and initiation  of heparin gtt with eventual change to oral anticoagulation.       Thank you for kindly for this consultation.     Total time spent on the date of the encounter: 20 minutes.      AUTUMN Faye CNP  Interventional Radiology  [phone]        "

## 2023-12-22 NOTE — ED NOTES
"[facility] ED Handoff Report    ED Chief Complaint: PE    ED Diagnosis:  (R06.09) Dyspnea on exertion  Comment:    Plan:      (I26.09) Acute pulmonary embolism with acute cor pulmonale, unspecified pulmonary embolism type (H)  Comment:    Plan:         PMH:  History reviewed. No pertinent past medical history.     Code Status:  Prior     Falls Risk: Yes Band: Applied    Current Living Situation/Residence: lives with a significant other     Elimination Status: Continent: Yes     Activity Level: Independent    Patients Preferred Language:  English     Needed: No    Vital Signs:  /69   Pulse 85   Temp 97.8  F (36.6  C) (Oral)   Resp 19   Ht 1.549 m (5' 1\")   Wt 83.9 kg (185 lb)   SpO2 92%   BMI 34.96 kg/m       Cardiac Rhythm: bigeminy, normal sinus with pvc's    Pain Score: 2/10    Is the Patient Confused:  No    Last Food or Drink: 12/22/23 at unknown    Focused Assessment:  4 days of sob, especially with activity, feeling of discomfort in chest 2/10 intensity    Tests Performed: Done: Labs and Imaging    Treatments Provided:  Heparin started    Family Dynamics/Concerns: No    Family Updated On Visitor Policy: Yes    Plan of Care Communicated to Family: Yes    Who Was Updated about Plan of Care:  with pt    Belongings Checklist Done and Signed by Patient: Yes    Medications sent with patient: none    Covid: symptomatic, negative    Additional Information: pt is getting BLE dopplers at this time and will come to P4 410 afterward    RN: Diane Carrasco RN    12/22/2023 2:26 PM      "

## 2023-12-22 NOTE — H&P
ADMISSION HISTORY & PHYSICAL    CODE STATUS:  Full code    Suman Doshi, [phone]    Patient YOB: 1955  Admit date: 12/22/2023  Date of Service: 12/22/2023    ASSESSMENT AND PLAN:  Patient is a 68 year old female with PMH significant for hypertension and remote h/o RLE DVT who presented to our ED for evaluation of ROCK and fatigue.     Shortness of breath  Fatigue  -- 4 days duration. Found to have bilateral PE with moderate clot burden and right heart strain per CT  -- Per IR, the clots are too distal for thrombectomy  -- Started on heparin gtt. Check an echo.  -- Unclear on etiology of PE. No recent travel, no known h/o malignancy. Patient reports DVT RLE >40 years ago. Also reports that her mother had PE when she was in her 60s. Recommend outpatient work up.     Elevated trop  -- No chest pain  -- Trop on 46-->127 noted. Trend.   -- EKG- sinus rhythm. Check echo as above. Cardiology consult if trop keeps rising.    Leg swelling  -- Likely due to acute DVT. Ultrasound- pending  -- Normal BNP noted    Essential hypertension  -- Cont home meds with hold parameters    Disposition:  -Anticipated Length of Stay in midnights and medical necessity (including a midnight in the Emergency Department after triage if applicable): >2      CHIEF COMPLAINT:  Shortness of breath and fatigue    HISTORY OF PRESENTING ILLNESS:  Patient is a 68 year old female with PMH significant for hypertension and remote h/o RLE DVT who presented to our ED for evaluation of ROCK and fatigue.     Patient reports she was having some diarrhea for the last couple of wks. Seen by her PCP on 12/13/23 and our ED on 12/17/23. She presents back to our ED today with 4 days of dyspnea on exertion and fatigue. Denies any chest pain. Denies any cough, fever or chills. Endorses having chronic leg swelling. In ED, she was found to have bilateral PE with moderate clot burden and right ventricular strain. Started on heparin gtt and admitted for  further management.    PMH/PSH:  Patient Active Problem List   Diagnosis    Obstructive sleep apnea    Thrombophlebitis Of The Left Deep Femoral Vein    Post-gastric Bypass For Obesity    Nephrolithiasis    Adenomatous colon polyp (02/2014)    Essential hypertension    Dyspnea on exertion    Acute pulmonary embolism with acute cor pulmonale, unspecified pulmonary embolism type (H)       History reviewed. No pertinent past medical history.     Past Surgical History:   Procedure Laterality Date    CHOLECYSTECTOMY      GASTRIC BYPASS  1980    HYSTERECTOMY TOTAL ABDOMINAL  1993    RELEASE CARPAL TUNNEL Right     TONSILLECTOMY      TUBAL LIGATION            ALLERGIES:  Allergies   Allergen Reactions    Other Allergy (See Comments) [External Allergen Needs Reconciliation - See Comment] Unknown     Ointment Base External Ointment, 12/10/2007.  Action: RASH.; Ointment Base External Ointment, 12/10/2007.  Action: RASH.         MEDICATIONS:  Reviewed.  Current Facility-Administered Medications   Medication    cyanocobalamin injection 1,000 mcg    cyanocobalamin injection 1,000 mcg    heparin ANTICOAGULANT Loading dose for HIGH INTENSITY TREATMENT * Give BEFORE starting heparin infusion    heparin infusion 25,000 units in D5W 250 mL ANTICOAGULANT     Current Outpatient Medications   Medication    aspirin 81 MG EC tablet    Calcium Carbonate-Vit D-Min (CALCIUM 1200 PO)    cholecalciferol, vitamin D3, 5,000 unit Tab    ferrous sulfate 325 (65 FE) MG tablet    fish oil-omega-3 fatty acids 1000 MG capsule    hydrochlorothiazide (HYDRODIURIL) 25 MG tablet    losartan (COZAAR) 25 MG tablet    Probiotic Product (DAILY DIGESTIVE PROBIOTIC PO)    triamcinolone (KENALOG) 0.1 % cream       Current Facility-Administered Medications:     cyanocobalamin injection 1,000 mcg, 1,000 mcg, Intramuscular, Q30 Days, Suman Doshi MD, 1,000 mcg at 10/02/23 1104    cyanocobalamin injection 1,000 mcg, 1,000 mcg, Intramuscular, Q30 Days, [place]  Suman MIGUEL MD, 1,000 mcg at 12/04/23 1105    heparin ANTICOAGULANT Loading dose for HIGH INTENSITY TREATMENT * Give BEFORE starting heparin infusion, 80 Units/kg, Intravenous, Once, Singh Stallings MD    heparin infusion 25,000 units in D5W 250 mL ANTICOAGULANT, 0-5,000 Units/hr, Intravenous, Continuous, Singh Stallings MD    Current Outpatient Medications:     aspirin 81 MG EC tablet, [ASPIRIN 81 MG EC TABLET] Take 81 mg by mouth daily., Disp: , Rfl:     Calcium Carbonate-Vit D-Min (CALCIUM 1200 PO), , Disp: , Rfl:     cholecalciferol, vitamin D3, 5,000 unit Tab, [CHOLECALCIFEROL, VITAMIN D3, 5,000 UNIT TAB] Take 1 tablet by mouth daily. , Disp: , Rfl:     ferrous sulfate 325 (65 FE) MG tablet, [FERROUS SULFATE 325 (65 FE) MG TABLET] Take 325 mg by mouth., Disp: , Rfl:     fish oil-omega-3 fatty acids 1000 MG capsule, Take 2 g by mouth daily, Disp: , Rfl:     hydrochlorothiazide (HYDRODIURIL) 25 MG tablet, Take 1 tablet (25 mg) by mouth daily, Disp: 90 tablet, Rfl: 3    losartan (COZAAR) 25 MG tablet, Take 1 tablet (25 mg) by mouth 2 times daily, Disp: 180 tablet, Rfl: 3    Probiotic Product (DAILY DIGESTIVE PROBIOTIC PO), , Disp: , Rfl:     triamcinolone (KENALOG) 0.1 % cream, [TRIAMCINOLONE (KENALOG) 0.1 % CREAM] Apply to affected areas 3 times a day for 2 weeks, Disp: 45 g, Rfl: 1   Scheduled Meds:   cyanocobalamin  1,000 mcg Intramuscular Q30 Days    cyanocobalamin  1,000 mcg Intramuscular Q30 Days    heparin ANTICOAGULANT Loading dose  80 Units/kg Intravenous Once     Continuous Infusions:   heparin       PRN Meds:.    SOCIAL HISTORY:  Social History     Socioeconomic History    Marital status:      Spouse name: Not on file    Number of children: Not on file    Years of education: Not on file    Highest education level: Not on file   Occupational History    Not on file   Tobacco Use    Smoking status: Never    Smokeless tobacco: Never   Vaping Use    Vaping Use: Never used   Substance and Sexual  "Activity    Alcohol use: Yes     Alcohol/week: 7.0 standard drinks of alcohol     Types: 7 Standard drinks or equivalent per week    Drug use: No    Sexual activity: Never     Partners: Male   Other Topics Concern    Not on file   Social History Narrative    Not on file     Social Determinants of Health     Financial Resource Strain: Not on file   Food Insecurity: Not on file   Transportation Needs: Not on file   Physical Activity: Not on file   Stress: Not on file   Social Connections: Not on file   Interpersonal Safety: Not on file   Housing Stability: Not on file       FAMILY HISTORY:  Family History   Problem Relation Age of Onset    Heart Failure Mother     Atrial fibrillation Mother     Macular Degeneration Mother     Diabetes Type 2  Father         Older-age onset of diabetes for multiple family members in father's side    Cancer Sister         \"Female cancer wih lymph node removal\"    Glaucoma Sister         Being watched for glaucoma    Depression Sister     Fibromyalgia Sister     Glaucoma Sister         ROS:  12 point review of systems reviewed and is negative except for what has already been mentioned in HPI.       PHYSICAL EXAM:  GENRL:  Not in acute distress   /69   Pulse 85   Temp 97.8  F (36.6  C) (Oral)   Resp 19   Ht 1.549 m (5' 1\")   Wt 83.9 kg (185 lb)   SpO2 92%   BMI 34.96 kg/m    No intake/output data recorded.  No intake/output data recorded.  HEENT: NC/AT      Eyes-  Pupil round and reactive to light bilaterally       Neck- supple, no JVP elevation,       Sclera- anicteric      Oropharynx- moist and pink  CHEST: Clear to auscultation bilateral anteriorly, no ronchi or wheezing  HEART: S1S2 normal, regular.   ABDMN: Soft. Non-tender, non-distended.  No guarding or rigidity. Bowel sounds- active  EXTRM: 1+ pedal edema bilateraly  INTGM: No skin rash, no cyanosis or clubbing  MUSCULOSKELETAL: no joint tenderness or swelling on upper and lower extremities  NEURO: Alert and awake. " "Cranial nerves II-XII grossly intact. No focal neurological deficit.      DIAGNOSTIC DATA:    Recent Labs   Lab 12/22/23  1152 12/17/23  1553   WBC 6.4 9.2   HGB 13.4 13.6   HCT 40.0 40.2    357       Recent Labs   Lab 12/22/23  1152 12/17/23  1414    134*   CO2 30* 28   BUN 14.1 13.9       No results for input(s): \"INR\" in the last 168 hours.    Recent Labs   Lab 12/22/23  1152 12/17/23  1414    134*   CO2 30* 28   BUN 14.1 13.9   ALBUMIN 2.5*  --    ALKPHOS 68  --    ALT 22  --    AST 18  --        No results found.    Patient's new lab studies reviewed personally.  Patient's new radiology reports reviewed personally.  I personally viewed and personally interpreted patient's EKG: NSR    Note created using dragon voice recognition software.  Errors in spelling or words which seems out of context are unintentional.  Sounds alike errors may have escaped editing.     12/22/2023  Christopher Churchill MD  HOSPITALIST, [facility]  PAGER NO. [phone]    "

## 2023-12-22 NOTE — ED TRIAGE NOTES
Pt states that she has had shortness of breath and fatigue for the last four days. Pt able to speak in complete sentences and does not have an increased work of breathing in triage. Pt denies any cough or congestion.      Triage Assessment (Adult)       Row Name 12/22/23 1125          Triage Assessment    Airway WDL WDL        Respiratory WDL    Respiratory WDL rhythm/pattern     Rhythm/Pattern, Respiratory shortness of breath;depth regular;pattern regular;unlabored        Skin Circulation/Temperature WDL    Skin Circulation/Temperature WDL WDL        Cardiac WDL    Cardiac WDL WDL        Peripheral/Neurovascular WDL    Peripheral Neurovascular WDL WDL        Cognitive/Neuro/Behavioral WDL    Cognitive/Neuro/Behavioral WDL WDL

## 2023-12-22 NOTE — ED PROVIDER NOTES
EMERGENCY DEPARTMENT ENCOUNTER      NAME: Dandy Mejia  AGE: 68 year old female  YOB: 1955  MRN: [id number]  EVALUATION DATE & TIME: No admission date for patient encounter.    PCP: Suman Doshi    ED PROVIDER: Singh Stallings M.D.    Chief Complaint   Patient presents with    Shortness of Breath     FINAL IMPRESSION:  1. Dyspnea on exertion    2. Acute pulmonary embolism with acute cor pulmonale, unspecified pulmonary embolism type (H)      ED COURSE & MEDICAL DECISION MAKING:    Pertinent Labs & Imaging studies independently interpreted by me. (See chart for details)  1:05 PM Patient seen and examined, reviewed most recent visit from December 17 when patient was seen with weakness and diarrhea, at that time was hypokalemic which was replaced.  Patient presents today with shortness of breath and dyspnea on exertion.  On exam here, no tachycardia or hypoxia, speaking in full sentences, lungs are clear.  Labs ordered and independently interpreted by me prior to evaluation of the patient with reassuring venous blood gas, hypercarbia but normal pH, reassuring basic metabolic panel including normal potassium, troponin elevated at 46 and will be repeated.  CBC is reassuring with no anemia.  Lungs are clear, CT PE study is ordered along with repeat troponin.  1:40 PM CT PE study independently interpreted by me demonstrates moderate burden bilateral pulmonary emboli, troponin is elevated although BNP is only minimally elevated.  Will discuss with interventional radiology, plan for admission and IR consult.  1:47 PM  Care discussed with Dr. Regan, radiologist, right heart strain.  1:52 PM  Care discussed with IR, too distal for thrombectomy.  2:03 PM Care discussed with Dr. Churchill, hospitalist for admission.    At the conclusion of the encounter I discussed the results of all of the tests and the disposition. The questions were answered. The patient or family acknowledged understanding and was  agreeable with the care plan.     Medical Decision Making    History:  Supplemental history from: Documented in chart, if applicable  External Record(s) reviewed: Documented in chart, if applicable. and Outpatient Record: 12/17/2023 [facility] Emergency Department Visit    Work Up:  Chart documentation includes differential considered and any EKGs or imaging independently interpreted by provider, where specified.  In additional to work up documented, I considered the following work up: Documented in chart, if applicable.    External consultation:  Discussion of management with another provider: Documented in chart, if applicable    Complicating factors:  Care impacted by chronic illness: Hypertension  Care affected by social determinants of health: N/A    Disposition considerations: Admit.    EKG:    Performed at: 11:34 AM, 12/22/2023  Impression: Sinus rhythm. Normal ECG.   Rate: 87 BPM  Rhythm: Sinus rhythm  Axis: 33  WA Interval: 194 ms  QRS Interval: 76 ms  QTc Interval: 430 ms  ST Changes: N/A  Comparison: No previous ECG available.    I have independently reviewed and interpreted the EKG(s) documented above.    PROCEDURES:     Critical Care     Performed by: Dr Singh Stallings  Total critical care time: 60 minutes  Critical care was necessary to treat or prevent imminent or life-threatening deterioration of the following conditions: Manera embolism with cor pulmonale, heparin initiated, consideration for thrombectomy, admission  Critical care was time spent personally by me on the following activities: development of treatment plan with patient or surrogate, discussions with consultants, examination of patient, evaluation of patient's response to treatment, obtaining history from patient or surrogate, ordering and performing treatments and interventions, ordering and review of laboratory studies, ordering and review of radiographic studies, re-evaluation of patient's condition and monitoring for potential  decompensation.  Critical care time was exclusive of separately billable procedures and treating other patients.      MEDICATIONS GIVEN IN THE EMERGENCY:  Medications   heparin infusion 25,000 units in D5W 250 mL ANTICOAGULANT (1,500 Units/hr Intravenous Rate/Dose Verify 12/22/23 2043)   aspirin EC tablet 81 mg (81 mg Oral $Given 12/22/23 1700)   Vitamin D3 (CHOLECALCIFEROL) tablet 125 mcg (125 mcg Oral $Given 12/22/23 1700)   ferrous sulfate (FEROSUL) tablet 325 mg (325 mg Oral $Given 12/22/23 1700)   hydrochlorothiazide (HYDRODIURIL) tablet 25 mg (has no administration in time range)   losartan (COZAAR) tablet 25 mg (25 mg Oral $Given 12/22/23 2043)   lidocaine 1 % 0.1-1 mL (has no administration in time range)   lidocaine (LMX4) cream (has no administration in time range)   sodium chloride (PF) 0.9% PF flush 3 mL (3 mLs Intracatheter $Given 12/22/23 1704)   sodium chloride (PF) 0.9% PF flush 3 mL (has no administration in time range)   senna-docusate (SENOKOT-S/PERICOLACE) 8.6-50 MG per tablet 1 tablet (has no administration in time range)     Or   senna-docusate (SENOKOT-S/PERICOLACE) 8.6-50 MG per tablet 2 tablet (has no administration in time range)   calcium carbonate (TUMS) chewable tablet 1,000 mg (has no administration in time range)   Patient is already receiving anticoagulation with heparin, enoxaparin (LOVENOX), warfarin (COUMADIN)  or other anticoagulant medication (has no administration in time range)   ondansetron (ZOFRAN ODT) ODT tab 4 mg (has no administration in time range)     Or   ondansetron (ZOFRAN) injection 4 mg (has no administration in time range)   acetaminophen (TYLENOL) tablet 650 mg (has no administration in time range)     Or   acetaminophen (TYLENOL) Suppository 650 mg (has no administration in time range)   iopamidol (ISOVUE-370) solution 90 mL (90 mLs Intravenous $Given 12/22/23 1331)   heparin ANTICOAGULANT Loading dose for HIGH INTENSITY TREATMENT * Give BEFORE starting heparin  infusion (6,700 Units Intravenous $Given 12/22/23 6667)       NEW PRESCRIPTIONS STARTED AT TODAY'S ER VISIT  Current Discharge Medication List          =================================================================    HPI    Patient information was obtained from: the patient      Dandy Mejia is a 68 year old female with a pertinent history of hypertension who presents to this ED walk-in for evaluation of shortness of breath.     The patient reports that she has had ongoing shortness of breath since the morning of 12/18 that is exacerbated by walking. Additionally, she has had chest discomfort and slight leg swelling. She denies vomiting or having a cough. She has no known medical issues. The patient takes Losartan and Hydrochlorothiazide. Of note, she was seen at this ED on 12/17.     Per Chart Review, the patient was seen on 12/17 at [facility] for evaluation of generalized weakness. The patient's potassium was 3.1, so she was given oral and IV potassium. However, she was not tolerating the IV potassium well. The patient was discharged with potassium.      REVIEW OF SYSTEMS   Review of Systems   Respiratory:  Positive for shortness of breath. Negative for cough.    Cardiovascular:  Positive for chest pain and leg swelling.   Gastrointestinal:  Negative for vomiting.      All other systems reviewed and negative    PAST MEDICAL HISTORY:  History reviewed. No pertinent past medical history.    PAST SURGICAL HISTORY:  Past Surgical History:   Procedure Laterality Date    CHOLECYSTECTOMY      GASTRIC BYPASS  1980    HYSTERECTOMY TOTAL ABDOMINAL  1993    RELEASE CARPAL TUNNEL Right     TONSILLECTOMY      TUBAL LIGATION         CURRENT MEDICATIONS:    Current Facility-Administered Medications   Medication    acetaminophen (TYLENOL) tablet 650 mg    Or    acetaminophen (TYLENOL) Suppository 650 mg    aspirin EC tablet 81 mg    calcium carbonate (TUMS) chewable tablet 1,000 mg    ferrous sulfate  "(FEROSUL) tablet 325 mg    heparin infusion 25,000 units in D5W 250 mL ANTICOAGULANT    [START ON 12/23/2023] hydrochlorothiazide (HYDRODIURIL) tablet 25 mg    lidocaine (LMX4) cream    lidocaine 1 % 0.1-1 mL    losartan (COZAAR) tablet 25 mg    ondansetron (ZOFRAN ODT) ODT tab 4 mg    Or    ondansetron (ZOFRAN) injection 4 mg    Patient is already receiving anticoagulation with heparin, enoxaparin (LOVENOX), warfarin (COUMADIN)  or other anticoagulant medication    senna-docusate (SENOKOT-S/PERICOLACE) 8.6-50 MG per tablet 1 tablet    Or    senna-docusate (SENOKOT-S/PERICOLACE) 8.6-50 MG per tablet 2 tablet    sodium chloride (PF) 0.9% PF flush 3 mL    sodium chloride (PF) 0.9% PF flush 3 mL    Vitamin D3 (CHOLECALCIFEROL) tablet 125 mcg       ALLERGIES:  Allergies   Allergen Reactions    Other Allergy (See Comments) [External Allergen Needs Reconciliation - See Comment] Unknown     Ointment Base External Ointment, 12/10/2007.  Action: RASH.; Ointment Base External Ointment, 12/10/2007.  Action: RASH.         FAMILY HISTORY:  Family History   Problem Relation Age of Onset    Heart Failure Mother     Atrial fibrillation Mother     Macular Degeneration Mother     Diabetes Type 2  Father         Older-age onset of diabetes for multiple family members in father's side    Cancer Sister         \"Female cancer wih lymph node removal\"    Glaucoma Sister         Being watched for glaucoma    Depression Sister     Fibromyalgia Sister     Glaucoma Sister        SOCIAL HISTORY:   Social History     Socioeconomic History    Marital status:    Tobacco Use    Smoking status: Never    Smokeless tobacco: Never   Vaping Use    Vaping Use: Never used   Substance and Sexual Activity    Alcohol use: Yes     Alcohol/week: 7.0 standard drinks of alcohol     Types: 7 Standard drinks or equivalent per week    Drug use: No    Sexual activity: Never     Partners: Male       VITALS:  /73 (BP Location: Left arm)   Pulse 77   " "Temp 97.8  F (36.6  C) (Oral)   Resp 20   Ht 1.549 m (5' 1\")   Wt 77 kg (169 lb 12.1 oz)   SpO2 94%   BMI 32.07 kg/m      PHYSICAL EXAM:  Physical Exam  Vitals and nursing note reviewed.   Constitutional:       Appearance: Normal appearance.   HENT:      Head: Normocephalic and atraumatic.      Right Ear: External ear normal.      Left Ear: External ear normal.      Nose: Nose normal.      Mouth/Throat:      Mouth: Mucous membranes are moist.   Eyes:      Extraocular Movements: Extraocular movements intact.      Conjunctiva/sclera: Conjunctivae normal.      Pupils: Pupils are equal, round, and reactive to light.   Cardiovascular:      Rate and Rhythm: Normal rate.      Comments: Occasional extrasystoles.  Pulmonary:      Effort: Pulmonary effort is normal.      Breath sounds: Normal breath sounds. No wheezing or rales.   Abdominal:      General: Abdomen is flat. There is no distension.      Palpations: Abdomen is soft.      Tenderness: There is no abdominal tenderness. There is no guarding.   Musculoskeletal:         General: Normal range of motion.      Cervical back: Normal range of motion and neck supple.      Comments: Mild bilateral lower extremity edema.   Lymphadenopathy:      Cervical: No cervical adenopathy.   Skin:     General: Skin is warm and dry.   Neurological:      General: No focal deficit present.      Mental Status: She is alert and oriented to person, place, and time. Mental status is at baseline.      Comments: No gross focal neurologic deficits   Psychiatric:         Mood and Affect: Mood normal.         Behavior: Behavior normal.         Thought Content: Thought content normal.          LAB:  All pertinent labs reviewed and interpreted.  Results for orders placed or performed during the hospital encounter of 12/22/23   CT Chest Pulmonary Embolism w Contrast   Result Value Ref Range    Radiologist flags New diagnosis of pulmonary embolism (AA)     Impression    IMPRESSION:  Acute segmental " and subsegmental emboli bilaterally with secondary signs of right heart strain. No evidence of pulmonary infarct.    [Critical Result: New diagnosis of pulmonary embolism]    Finding was identified on 12/22/2023 1:43 PM CST.     Dr. Stallings was contacted by me on 12/22/2023 1:47 PM CST and verbalized understanding of the critical result.   US Lower Extremity Venous Duplex Bilateral    Impression    IMPRESSION:  1.  No deep venous thrombosis in the bilateral lower extremities.  2.  Bilateral lower extremity subcutaneous edema.   Comprehensive metabolic panel   Result Value Ref Range    Sodium 135 135 - 145 mmol/L    Potassium 3.9 3.4 - 5.3 mmol/L    Carbon Dioxide (CO2) 30 (H) 22 - 29 mmol/L    Anion Gap 6 (L) 7 - 15 mmol/L    Urea Nitrogen 14.1 8.0 - 23.0 mg/dL    Creatinine 0.80 0.51 - 0.95 mg/dL    GFR Estimate 79 >60 mL/min/1.73m2    Calcium 8.1 (L) 8.8 - 10.2 mg/dL    Chloride 99 98 - 107 mmol/L    Glucose 97 70 - 99 mg/dL    Alkaline Phosphatase 68 40 - 150 U/L    AST 18 0 - 45 U/L    ALT 22 0 - 50 U/L    Protein Total 4.4 (L) 6.4 - 8.3 g/dL    Albumin 2.5 (L) 3.5 - 5.2 g/dL    Bilirubin Total 0.2 <=1.2 mg/dL   Blood gas venous   Result Value Ref Range    pH Venous 7.37 7.35 - 7.45    pCO2 Venous 54 (H) 35 - 50 mm Hg    pO2 Venous 23 (L) 25 - 47 mm Hg    Bicarbonate Venous 31 (H) 24 - 30 mmol/L    Base Excess/Deficit 5.7   mmol/L    Oxyhemoglobin Venous 32.5 (L) 70.0 - 75.0 %    O2 Sat, Venous 32.8 (L) 70.0 - 75.0 %   Nt probnp inpatient   Result Value Ref Range    N terminal Pro BNP Inpatient 387 0 - 900 pg/mL   Troponin T, High Sensitivity (now)   Result Value Ref Range    Troponin T, High Sensitivity 46 (H) <=14 ng/L   TSH with free T4 reflex   Result Value Ref Range    TSH 1.70 0.30 - 4.20 uIU/mL   Result Value Ref Range    Magnesium 1.7 1.7 - 2.3 mg/dL   CBC with platelets and differential   Result Value Ref Range    WBC Count 6.4 4.0 - 11.0 10e3/uL    RBC Count 4.25 3.80 - 5.20 10e6/uL    Hemoglobin  13.4 11.7 - 15.7 g/dL    Hematocrit 40.0 35.0 - 47.0 %    MCV 94 78 - 100 fL    MCH 31.5 26.5 - 33.0 pg    MCHC 33.5 31.5 - 36.5 g/dL    RDW 12.3 10.0 - 15.0 %    Platelet Count 264 150 - 450 10e3/uL    % Neutrophils 72 %    % Lymphocytes 14 %    % Monocytes 9 %    % Eosinophils 4 %    % Basophils 1 %    % Immature Granulocytes 0 %    NRBCs per 100 WBC 0 <1 /100    Absolute Neutrophils 4.6 1.6 - 8.3 10e3/uL    Absolute Lymphocytes 0.9 0.8 - 5.3 10e3/uL    Absolute Monocytes 0.6 0.0 - 1.3 10e3/uL    Absolute Eosinophils 0.2 0.0 - 0.7 10e3/uL    Absolute Basophils 0.0 0.0 - 0.2 10e3/uL    Absolute Immature Granulocytes 0.0 <=0.4 10e3/uL    Absolute NRBCs 0.0 10e3/uL   Symptomatic Influenza A/B, RSV, & SARS-CoV2 PCR (COVID-19) Nasopharyngeal    Specimen: Nasopharyngeal; Swab   Result Value Ref Range    Influenza A PCR Negative Negative    Influenza B PCR Negative Negative    RSV PCR Negative Negative    SARS CoV2 PCR Negative Negative   Result Value Ref Range    Troponin T, High Sensitivity 127 (HH) <=14 ng/L   Result Value Ref Range    Anti Xa Unfractionated Heparin 1.09 For Reference Range, See Comment IU/mL   Result Value Ref Range    aPTT 29 22 - 38 Seconds   Result Value Ref Range    Troponin T, High Sensitivity 101 (HH) <=14 ng/L   Extra Purple Top Tube   Result Value Ref Range    Hold Specimen [facility]        RADIOLOGY:  Reviewed all pertinent imaging. Please see official radiology report.  US Lower Extremity Venous Duplex Bilateral   Final Result   IMPRESSION:   1.  No deep venous thrombosis in the bilateral lower extremities.   2.  Bilateral lower extremity subcutaneous edema.      CT Chest Pulmonary Embolism w Contrast   Final Result   Abnormal   IMPRESSION:   Acute segmental and subsegmental emboli bilaterally with secondary signs of right heart strain. No evidence of pulmonary infarct.      [Critical Result: New diagnosis of pulmonary embolism]      Finding was identified on 12/22/2023 1:43 PM CST.         Chandrakant was contacted by me on 12/22/2023 1:47 PM CST and verbalized understanding of the critical result.      Echocardiogram Complete    (Results Pending)       I, Vaishali Del Toro, am serving as a scribe to document services personally performed by Dr. Stallings based on my observation and the provider's statements to me. I, Singh Stallings MD attest that Vaishali Del Toro is acting in a scribe capacity, has observed my performance of the services and has documented them in accordance with my direction.    Singh Stallings M.D.  Emergency Medicine  [facility] EMERGENCY DEPARTMENT  [facility] [facility] [id number] [phone]  Dept: [phone]       Singh Stallings MD  12/22/23 4969

## 2023-12-23 ENCOUNTER — APPOINTMENT (OUTPATIENT)
Dept: CARDIOLOGY | Facility: HOSPITAL | Age: 68
DRG: 175 | End: 2023-12-23
Attending: INTERNAL MEDICINE
Payer: COMMERCIAL

## 2023-12-23 LAB
ANION GAP SERPL CALCULATED.3IONS-SCNC: 5 MMOL/L (ref 7–15)
ATRIAL RATE - MUSE: 87 BPM
BUN SERPL-MCNC: 10.9 MG/DL (ref 8–23)
CALCIUM SERPL-MCNC: 7.7 MG/DL (ref 8.8–10.2)
CHLORIDE SERPL-SCNC: 100 MMOL/L (ref 98–107)
CREAT SERPL-MCNC: 0.6 MG/DL (ref 0.51–0.95)
DEPRECATED HCO3 PLAS-SCNC: 29 MMOL/L (ref 22–29)
DIASTOLIC BLOOD PRESSURE - MUSE: NORMAL MMHG
EGFRCR SERPLBLD CKD-EPI 2021: >90 ML/MIN/1.73M2
ERYTHROCYTE [DISTWIDTH] IN BLOOD BY AUTOMATED COUNT: 12.2 % (ref 10–15)
GLUCOSE SERPL-MCNC: 117 MG/DL (ref 70–99)
HCT VFR BLD AUTO: 34.7 % (ref 35–47)
HGB BLD-MCNC: 11.8 G/DL (ref 11.7–15.7)
INTERPRETATION ECG - MUSE: NORMAL
LVEF ECHO: NORMAL
MCH RBC QN AUTO: 31.9 PG (ref 26.5–33)
MCHC RBC AUTO-ENTMCNC: 34 G/DL (ref 31.5–36.5)
MCV RBC AUTO: 94 FL (ref 78–100)
P AXIS - MUSE: 69 DEGREES
PLATELET # BLD AUTO: 212 10E3/UL (ref 150–450)
POTASSIUM SERPL-SCNC: 3.2 MMOL/L (ref 3.4–5.3)
POTASSIUM SERPL-SCNC: 4.1 MMOL/L (ref 3.4–5.3)
PR INTERVAL - MUSE: 194 MS
QRS DURATION - MUSE: 76 MS
QT - MUSE: 358 MS
QTC - MUSE: 430 MS
R AXIS - MUSE: 33 DEGREES
RBC # BLD AUTO: 3.7 10E6/UL (ref 3.8–5.2)
SODIUM SERPL-SCNC: 134 MMOL/L (ref 135–145)
SYSTOLIC BLOOD PRESSURE - MUSE: NORMAL MMHG
T AXIS - MUSE: 50 DEGREES
UFH PPP CHRO-ACNC: 0.58 IU/ML
UFH PPP CHRO-ACNC: 0.59 IU/ML
VENTRICULAR RATE- MUSE: 87 BPM
WBC # BLD AUTO: 5.5 10E3/UL (ref 4–11)

## 2023-12-23 PROCEDURE — 120N000001 HC R&B MED SURG/OB

## 2023-12-23 PROCEDURE — 250N000013 HC RX MED GY IP 250 OP 250 PS 637: Performed by: INTERNAL MEDICINE

## 2023-12-23 PROCEDURE — 250N000011 HC RX IP 250 OP 636: Mod: JZ | Performed by: FAMILY MEDICINE

## 2023-12-23 PROCEDURE — 93306 TTE W/DOPPLER COMPLETE: CPT

## 2023-12-23 PROCEDURE — 93306 TTE W/DOPPLER COMPLETE: CPT | Mod: 26 | Performed by: INTERNAL MEDICINE

## 2023-12-23 PROCEDURE — 84132 ASSAY OF SERUM POTASSIUM: CPT | Performed by: INTERNAL MEDICINE

## 2023-12-23 PROCEDURE — 85027 COMPLETE CBC AUTOMATED: CPT | Performed by: INTERNAL MEDICINE

## 2023-12-23 PROCEDURE — 99222 1ST HOSP IP/OBS MODERATE 55: CPT | Mod: 25 | Performed by: INTERNAL MEDICINE

## 2023-12-23 PROCEDURE — 36415 COLL VENOUS BLD VENIPUNCTURE: CPT | Performed by: INTERNAL MEDICINE

## 2023-12-23 PROCEDURE — 99233 SBSQ HOSP IP/OBS HIGH 50: CPT | Performed by: INTERNAL MEDICINE

## 2023-12-23 PROCEDURE — 85520 HEPARIN ASSAY: CPT | Performed by: INTERNAL MEDICINE

## 2023-12-23 PROCEDURE — 80048 BASIC METABOLIC PNL TOTAL CA: CPT | Performed by: INTERNAL MEDICINE

## 2023-12-23 RX ORDER — POTASSIUM CHLORIDE 1500 MG/1
40 TABLET, EXTENDED RELEASE ORAL ONCE
Status: COMPLETED | OUTPATIENT
Start: 2023-12-23 | End: 2023-12-23

## 2023-12-23 RX ORDER — ROSUVASTATIN CALCIUM 10 MG/1
20 TABLET, COATED ORAL DAILY
Status: DISCONTINUED | OUTPATIENT
Start: 2023-12-23 | End: 2023-12-24 | Stop reason: HOSPADM

## 2023-12-23 RX ADMIN — ROSUVASTATIN CALCIUM 20 MG: 10 TABLET, FILM COATED ORAL at 14:06

## 2023-12-23 RX ADMIN — Medication 125 MCG: at 08:52

## 2023-12-23 RX ADMIN — FERROUS SULFATE TAB 325 MG (65 MG ELEMENTAL FE) 325 MG: 325 (65 FE) TAB at 08:52

## 2023-12-23 RX ADMIN — HEPARIN SODIUM 1300 UNITS/HR: 10000 INJECTION, SOLUTION INTRAVENOUS at 06:15

## 2023-12-23 RX ADMIN — POTASSIUM CHLORIDE 40 MEQ: 1500 TABLET, EXTENDED RELEASE ORAL at 08:52

## 2023-12-23 RX ADMIN — Medication 81 MG: at 08:51

## 2023-12-23 ASSESSMENT — ACTIVITIES OF DAILY LIVING (ADL)
ADLS_ACUITY_SCORE: 22
ADLS_ACUITY_SCORE: 23
ADLS_ACUITY_SCORE: 22
ADLS_ACUITY_SCORE: 23
ADLS_ACUITY_SCORE: 23
ADLS_ACUITY_SCORE: 22

## 2023-12-23 NOTE — PLAN OF CARE
"  Problem: Adult Inpatient Plan of Care  Goal: Plan of Care Review  Description: The Plan of Care Review/Shift note should be completed every shift.  The Outcome Evaluation is a brief statement about your assessment that the patient is improving, declining, or no change.  This information will be displayed automatically on your shift  note.  12/23/2023 1210 by Ksenia Cabrera RN  Outcome: Progressing  12/23/2023 1207 by Ksenia Cabrera RN  Outcome: Progressing  Goal: Patient-Specific Goal (Individualized)  Description: You can add care plan individualizations to a care plan. Examples of Individualization might be:  \"Parent requests to be called daily at 9am for status\", \"I have a hard time hearing out of my right ear\", or \"Do not touch me to wake me up as it startles  me\".  12/23/2023 1210 by Ksenia Cabrera RN  Outcome: Progressing  12/23/2023 1207 by Ksenia Cabrera RN  Outcome: Progressing  Goal: Absence of Hospital-Acquired Illness or Injury  12/23/2023 1210 by Ksenia Cabrera RN  Outcome: Progressing  12/23/2023 1207 by Ksenia Cabrera RN  Outcome: Progressing  Intervention: Identify and Manage Fall Risk  Recent Flowsheet Documentation  Taken 12/23/2023 0900 by Ksenia Cabrera RN  Safety Promotion/Fall Prevention:   room door open   room near nurse's station   toileting scheduled  Intervention: Prevent Skin Injury  Recent Flowsheet Documentation  Taken 12/23/2023 0900 by Ksenia Cabrera RN  Body Position: position changed independently  Intervention: Prevent Infection  Recent Flowsheet Documentation  Taken 12/23/2023 0900 by Ksenia Cabrera RN  Infection Prevention: hand hygiene promoted  Goal: Optimal Comfort and Wellbeing  12/23/2023 1210 by Ksenia Cabrera RN  Outcome: Progressing  12/23/2023 1207 by Ksenia Cabrera RN  Outcome: Progressing  Goal: Readiness for Transition of Care  12/23/2023 1210 by Ksenia Cabrera RN  Outcome: Progressing  12/23/2023 1207 by Ksenia Cabrera RN  Outcome: Progressing     Problem: Risk for Delirium  Goal: Optimal " Coping  12/23/2023 1210 by Ksenia Cabrera RN  Outcome: Progressing  12/23/2023 1207 by Ksenia Cabrera RN  Outcome: Progressing  Goal: Improved Behavioral Control  12/23/2023 1210 by Ksenia Cabrera RN  Outcome: Progressing  12/23/2023 1207 by Ksenia Cabrera RN  Outcome: Progressing  Intervention: Minimize Safety Risk  Recent Flowsheet Documentation  Taken 12/23/2023 0900 by Ksenia Cabrera RN  Enhanced Safety Measures: room near unit station  Goal: Improved Attention and Thought Clarity  12/23/2023 1210 by Ksenia Cabrera RN  Outcome: Progressing  12/23/2023 1207 by Ksenia Cabrera RN  Outcome: Progressing  Goal: Improved Sleep  12/23/2023 1210 by Ksenia Cabrera RN  Outcome: Progressing  12/23/2023 1207 by Ksenia Cbarera RN  Outcome: Progressing     Problem: VTE (Venous Thromboembolism)  Goal: Tissue Perfusion  Outcome: Progressing  Goal: Right Ventricular Function  Outcome: Progressing   Goal Outcome Evaluation:    Pt alert and oriented. On 1L O2 NC d/t SOB with activity. Steady gait, ambulate in room with stand by assist, only needing help with lines/IV. LS clear, diminished. IV heparin continued. 0930 antiXA 0.58. per protocol no changes to rate and recheck tomorrow morning 0600. MD aware. ECHO complete. Cardiology seen Pt. BP meds held this morning per parameters, MD aware. Slight edema bilateral ankles. Pt elevating legs. Continue to monitor

## 2023-12-23 NOTE — PROGRESS NOTES
Upon inquiring about CPAP use patient states she does not have her unit with her. Hospital unit was offered but she declined. RT will follow.    Dre Sargent, RT

## 2023-12-23 NOTE — PROGRESS NOTES
"Daily Progress Note        CODE STATUS:  Full Code    12/23/23  Assessment/Plan:  Patient is a 68 year old female with PMH significant for hypertension and remote h/o RLE DVT who presented to our ED for evaluation of ROCK and fatigue.      Shortness of breath  Fatigue  -- 4 days duration. Found to have bilateral PE with moderate clot burden and right heart strain per CT  -- Per IR, the clots are too distal for thrombectomy  -- Started on heparin gtt. Check an echo.  -- Unclear on etiology of PE. No recent travel, no known h/o malignancy. Patient reports DVT RLE >40 years ago. Also reports that her mother had PE when she was in her 60s. Recommend outpatient work up. Will make referral to [facility] upon discharge tomorrow  -- Switch Heparin infusion to Eliquis 5mg BID from tomorrow     Elevated trop  -- No chest pain  -- Trop on 46-->127 noted. Trend.   -- EKG- sinus rhythm. Check echo as above. Cardiology consulted as the patient reported some vague left sided chest discomfort to the nursing staff. Cardiology ordered statin for primary prevention of ischemic cardiac events     Leg swelling, chronic  -- Ultrasound- negative for DVT  -- Normal BNP noted     Essential hypertension  -- Cont home meds with hold parameters      Disposition; Home tomorrow  Barrier to discharge; Heparin infusion, pending echo.    Subjective:  Interval History: Patient seen and examined. Notes, labs, imaging reports personally reviewed. Patient reports doing well with no chest pain or shortness of breath at rest. It seems she reported to some vague left sided chest discomfort to the nursing staff. When I asked about it, she stated it was \"nothing\". She tends to minimize her symptoms.     Review of Systems:   As mentioned in subjective.    Patient Active Problem List   Diagnosis    Obstructive sleep apnea    Thrombophlebitis Of The Left Deep Femoral Vein    Post-gastric Bypass For Obesity    Nephrolithiasis    Adenomatous colon polyp (02/2014)    " Essential hypertension    Dyspnea on exertion    Acute pulmonary embolism with acute cor pulmonale, unspecified pulmonary embolism type (H)       Scheduled Meds:   aspirin  81 mg Oral Daily    ferrous sulfate  325 mg Oral Daily    hydrochlorothiazide  25 mg Oral Daily    losartan  25 mg Oral BID    rosuvastatin  20 mg Oral Daily    sodium chloride (PF)  3 mL Intracatheter Q8H    vitamin D3  125 mcg Oral Daily     Continuous Infusions:   heparin 1,300 Units/hr (12/23/23 0954)    - MEDICATION INSTRUCTIONS -       PRN Meds:.acetaminophen **OR** acetaminophen, calcium carbonate, lidocaine 4%, lidocaine (buffered or not buffered), ondansetron **OR** ondansetron, - MEDICATION INSTRUCTIONS -, senna-docusate **OR** senna-docusate, sodium chloride (PF)    Objective:  Vital signs in last 24 hours:  Temp:  [97.5  F (36.4  C)-98  F (36.7  C)] 98  F (36.7  C)  Pulse:  [70-83] 76  Resp:  [20-24] 20  BP: (108-135)/(59-73) 108/68  SpO2:  [94 %-100 %] 96 %        Intake/Output Summary (Last 24 hours) at 12/23/2023 1331  Last data filed at 12/22/2023 1939  Gross per 24 hour   Intake 734 ml   Output --   Net 734 ml       Physical Exam:    General: Not in obvious distress.  HEENT: NC, AT   Chest: Clear to auscultation bilaterally  Heart: S1S2 normal, regular. No M/R/G  Abdomen: Soft. NT, ND. Bowel sounds- active.  Extremities: 1+ pedal edema bilaterally  Neuro: Alert and awake, grossly non-focal      Lab Results:(I have personally reviewed the results)    Recent Results (from the past 24 hour(s))   Troponin T, High Sensitivity    Collection Time: 12/22/23  1:36 PM   Result Value Ref Range    Troponin T, High Sensitivity 127 (HH) <=14 ng/L   PTT    Collection Time: 12/22/23  2:16 PM   Result Value Ref Range    aPTT 29 22 - 38 Seconds   Heparin Unfractionated Anti Xa Level    Collection Time: 12/22/23  7:47 PM   Result Value Ref Range    Anti Xa Unfractionated Heparin 1.09 For Reference Range, See Comment IU/mL   Troponin T, High  Sensitivity    Collection Time: 12/22/23  7:47 PM   Result Value Ref Range    Troponin T, High Sensitivity 101 (HH) <=14 ng/L   Extra Purple Top Tube    Collection Time: 12/22/23  7:47 PM   Result Value Ref Range    Hold Specimen JIC    Heparin Unfractionated Anti Xa Level    Collection Time: 12/23/23  3:30 AM   Result Value Ref Range    Anti Xa Unfractionated Heparin 0.59 For Reference Range, See Comment IU/mL   Basic metabolic panel    Collection Time: 12/23/23  6:02 AM   Result Value Ref Range    Sodium 134 (L) 135 - 145 mmol/L    Potassium 3.2 (L) 3.4 - 5.3 mmol/L    Chloride 100 98 - 107 mmol/L    Carbon Dioxide (CO2) 29 22 - 29 mmol/L    Anion Gap 5 (L) 7 - 15 mmol/L    Urea Nitrogen 10.9 8.0 - 23.0 mg/dL    Creatinine 0.60 0.51 - 0.95 mg/dL    GFR Estimate >90 >60 mL/min/1.73m2    Calcium 7.7 (L) 8.8 - 10.2 mg/dL    Glucose 117 (H) 70 - 99 mg/dL   CBC with platelets    Collection Time: 12/23/23  6:02 AM   Result Value Ref Range    WBC Count 5.5 4.0 - 11.0 10e3/uL    RBC Count 3.70 (L) 3.80 - 5.20 10e6/uL    Hemoglobin 11.8 11.7 - 15.7 g/dL    Hematocrit 34.7 (L) 35.0 - 47.0 %    MCV 94 78 - 100 fL    MCH 31.9 26.5 - 33.0 pg    MCHC 34.0 31.5 - 36.5 g/dL    RDW 12.2 10.0 - 15.0 %    Platelet Count 212 150 - 450 10e3/uL   Heparin Unfractionated Anti Xa Level    Collection Time: 12/23/23  9:21 AM   Result Value Ref Range    Anti Xa Unfractionated Heparin 0.58 For Reference Range, See Comment IU/mL       All laboratory and imaging data in the past 24 hours reviewed  Serum Glucose range:   Recent Labs   Lab 12/23/23  0602 12/22/23  1152 12/17/23  1414   * 97 107*     ABG: No lab results found in last 7 days.  CBC:   Recent Labs   Lab 12/23/23  0602 12/22/23  1152 12/17/23  1553   WBC 5.5 6.4 9.2   HGB 11.8 13.4 13.6   HCT 34.7* 40.0 40.2   MCV 94 94 93    264 357   NEUTROPHIL  --  72  --    LYMPH  --  14  --    MONOCYTE  --  9  --    EOSINOPHIL  --  4  --      Chemistry:   Recent Labs   Lab  "12/23/23  0602 12/22/23  1152 12/17/23  1414   * 135 134*   POTASSIUM 3.2* 3.9 3.1*   CHLORIDE 100 99 96*   CO2 29 30* 28   BUN 10.9 14.1 13.9   CR 0.60 0.80 0.76   GFRESTIMATED >90 79 84   ZACHARY 7.7* 8.1* 8.3*   MAG  --  1.7 1.8   PROTTOTAL  --  4.4*  --    ALBUMIN  --  2.5*  --    AST  --  18  --    ALT  --  22  --    ALKPHOS  --  68  --    BILITOTAL  --  0.2  --      Coags:  Recent Labs   Lab 12/22/23  1416   PTT 29     Cardiac Markers:  No results for input(s): \"CKTOTAL\", \"TROPONINI\" in the last 168 hours.       US Lower Extremity Venous Duplex Bilateral    Result Date: 12/22/2023  EXAM: US LOWER EXTREMITY VENOUS DUPLEX BILATERAL LOCATION: [facility] DATE: 12/22/2023 INDICATION: Recently diagnosed pulmonary artery embolism, heparin therapy, leg swelling COMPARISON: None. TECHNIQUE: Venous Duplex ultrasound of bilateral lower extremities with and without compression, augmentation and duplex. Color flow and spectral Doppler with waveform analysis performed. FINDINGS: Exam includes the common femoral, femoral, popliteal veins as well as segmentally visualized deep calf veins and greater saphenous vein. RIGHT: No deep vein thrombosis. No superficial thrombophlebitis. No popliteal cyst. LEFT: No deep vein thrombosis. No superficial thrombophlebitis. No popliteal cyst. Mild right calf and moderate left calf subcutaneous edema.     IMPRESSION: 1.  No deep venous thrombosis in the bilateral lower extremities. 2.  Bilateral lower extremity subcutaneous edema.    CT Chest Pulmonary Embolism w Contrast    Result Date: 12/22/2023  EXAM: CT CHEST PULMONARY EMBOLISM W CONTRAST LOCATION: [facility] DATE: 12/22/2023 INDICATION: Dyspnea, lower extremity swelling right greater than left, hypoxia COMPARISON: None. TECHNIQUE: CT chest pulmonary angiogram during arterial phase injection of IV contrast. Multiplanar reformats and MIP reconstructions were performed. Dose reduction " techniques were used. CONTRAST: 90ml isovue 370 FINDINGS: ANGIOGRAM CHEST: Filling defects within the segmental and subsegmental branches of all lobes bilaterally. Thoracic aorta is negative for dissection. RV/LV ratio abnormal, greater than 1.0. LUNGS AND PLEURA: Normal. MEDIASTINUM/AXILLAE: Normal. CORONARY ARTERY CALCIFICATION: Mild. UPPER ABDOMEN: Postsurgical changes in the stomach. Tiny benign angiomyolipoma left kidney. MUSCULOSKELETAL: Multilevel degenerative changes in the spine.     IMPRESSION: Acute segmental and subsegmental emboli bilaterally with secondary signs of right heart strain. No evidence of pulmonary infarct. [Critical Result: New diagnosis of pulmonary embolism] Finding was identified on 12/22/2023 1:43 PM CST. Dr. Stallings was contacted by me on 12/22/2023 1:47 PM CST and verbalized understanding of the critical result.      Latest radiology report personally reviewed.    Note created using dragon voice recognition software so sounds alike errors may have escaped editing.      12/23/2023   Christopher Churchill MD  Hospitalist, [facility]  Pager: [phone]

## 2023-12-23 NOTE — CONSULTS
"  Thank you,  No ref. provider found, for asking the [facility] Care team to see Dandy Mejia to evaluate elevated troponin.      Assessment/Recommendations   Assessment:    Pulmonary embolism - moderate thrombotic burden with RV strain, hemodynamically stable  Minor troponin elevation secondary to RV strain  Coronary calcification, mild, consistent with nonobstructive coronary atherosclerosis  Hypertension, controlled    Plan:  Echo, repeat in 3 months if pulmonary pressures elevated  Anticoagulation  Primary prevention of ischemic cardiac events with statin        History of Present Illness/Subjective    Ms. Dandy Mejia is a 68 year old female who came into the emergency with complaints of progressive shortness of breath.  She was found to have moderate PE with RV strain.  She had mildly elevated troponin.  She denies any chest pains.  She has no history of nonobstructive coronary artery disease.  She has no prior history of known PE.  She reports that for a week prior to shortness of breath she had severe diarrhea and she gets badly dehydrated.  She denies any history of valvular heart disease or cardiomyopathy.    ECG: Personally reviewed.  Normal sinus rhythm within normal limits.    ECHO (personnaly Reviewed): Pending    CT chest  Acute segmental and subsegmental emboli bilaterally with secondary signs of right heart strain. No evidence of pulmonary infarct.  Mild coronary calcification     Physical Examination Review of Systems   /68 (BP Location: Left arm)   Pulse 76   Temp 98  F (36.7  C) (Oral)   Resp 20   Ht 1.549 m (5' 1\")   Wt 77 kg (169 lb 12.1 oz)   SpO2 96%   BMI 32.07 kg/m    Body mass index is 32.07 kg/m .  Wt Readings from Last 3 Encounters:   12/22/23 77 kg (169 lb 12.1 oz)   12/17/23 80.7 kg (178 lb)   12/13/23 81.6 kg (180 lb)       Intake/Output Summary (Last 24 hours) at 12/23/2023 1202  Last data filed at 12/22/2023 1939  Gross per 24 hour   Intake 734 ml   Output " "--   Net 734 ml     General Appearance:   Alert, cooperative, no distress, appears stated age   Head/ENT: Normocephalic, without obvious abnormality. Membranes moist.      EYES:  No scleral icterus   Neck: Supple, symmetrical, trachea midline, no adenopathy, thyroid: not enlarged, symmetric, no carotid bruit or JVD   Chest/Lungs:   lungs are clear to auscultation, respirations unlabored. No tenderness or deformity   Cardiovascular:   Regular rhythm, S1, S2 normal, no murmur, rub or gallop.   Abdomen:  Soft, non-tender, bowel sounds active all four quadrants,  no masses, no organomegaly   Extremities: no cyanosis or clubbing. No edema   Skin: Skin color, texture, turgor normal, no rashes or lesions.    Neurologic: Alert and oriented x 3, moving all four extremities.    Psychiatric: Normal affect.      10 system review of systems completed see history of present illness and inpatient H&P (reviewed) for further details.          Lab Results    Chemistry/lipid CBC Cardiac Enzymes/BNP/TSH/INR   Recent Labs   Lab Test 08/17/23  1040   CHOL 195   HDL 71   *   TRIG 113     Recent Labs   Lab Test 08/17/23  1040 08/29/22  1035 08/23/21  1027   * 104* 129     Recent Labs   Lab Test 12/23/23  0602   *   POTASSIUM 3.2*   CHLORIDE 100   CO2 29   *   BUN 10.9   CR 0.60   GFRESTIMATED >90   ZACHARY 7.7*     Recent Labs   Lab Test 12/23/23  0602 12/22/23  1152 12/17/23  1414   CR 0.60 0.80 0.76     No results for input(s): \"A1C\" in the last 68441 hours.       Recent Labs   Lab Test 12/23/23  0602   WBC 5.5   HGB 11.8   HCT 34.7*   MCV 94        Recent Labs   Lab Test 12/23/23  0602 12/22/23  1152 12/17/23  1553   HGB 11.8 13.4 13.6    No results for input(s): \"TROPONINI\" in the last 40804 hours.  Recent Labs   Lab Test 12/22/23  1152   NTBNPI 387     Recent Labs   Lab Test 12/22/23  1152   TSH 1.70     No results for input(s): \"INR\" in the last 87881 hours.     Medical History  Surgical History Family " History Social History   Hypertension   Past Surgical History:   Procedure Laterality Date    CHOLECYSTECTOMY      GASTRIC BYPASS  1980    HYSTERECTOMY TOTAL ABDOMINAL  1993    RELEASE CARPAL TUNNEL Right     TONSILLECTOMY      TUBAL LIGATION       No premature CAD, SCD,cardiomyopathy   Social History     Socioeconomic History    Marital status:      Spouse name: Not on file    Number of children: Not on file    Years of education: Not on file    Highest education level: Not on file   Occupational History    Not on file   Tobacco Use    Smoking status: Never    Smokeless tobacco: Never   Vaping Use    Vaping Use: Never used   Substance and Sexual Activity    Alcohol use: Yes     Alcohol/week: 7.0 standard drinks of alcohol     Types: 7 Standard drinks or equivalent per week    Drug use: No    Sexual activity: Never     Partners: Male   Other Topics Concern    Not on file   Social History Narrative    Not on file     Social Determinants of Health     Financial Resource Strain: Not on file   Food Insecurity: Not on file   Transportation Needs: Not on file   Physical Activity: Not on file   Stress: Not on file   Social Connections: Not on file   Interpersonal Safety: Not on file   Housing Stability: Not on file         Medications  Allergies   Current Facility-Administered Medications Ordered in Epic   Medication Dose Route Frequency Provider Last Rate Last Admin    acetaminophen (TYLENOL) tablet 650 mg  650 mg Oral Q4H PRN Christopher Churchill MBBS        Or    acetaminophen (TYLENOL) Suppository 650 mg  650 mg Rectal Q4H PRN Christopher Churchill, MBCATALINA        aspirin EC tablet 81 mg  81 mg Oral Daily Christopher Churchill MBBS   81 mg at 12/23/23 0851    calcium carbonate (TUMS) chewable tablet 1,000 mg  1,000 mg Oral 4x Daily PRN Christopher Churchill, MBCATALINA        ferrous sulfate (FEROSUL) tablet 325 mg  325 mg Oral Daily Christopher Churchill MBBS   325 mg at 12/23/23 0852    heparin infusion 25,000 units in D5W 250 mL  ANTICOAGULANT  0-5,000 Units/hr Intravenous Continuous Singh Stallings MD 13 mL/hr at 12/23/23 0954 1,300 Units/hr at 12/23/23 0954    hydrochlorothiazide (HYDRODIURIL) tablet 25 mg  25 mg Oral Daily Bairagi, Christopher B, MBBS        lidocaine (LMX4) cream   Topical Q1H PRN Bairagi, Christopher B, MBBS        lidocaine 1 % 0.1-1 mL  0.1-1 mL Other Q1H PRN Bairagi, Christopher B, MBBS        losartan (COZAAR) tablet 25 mg  25 mg Oral BID Bairagi, Christopher B, MBBS   25 mg at 12/22/23 2043    ondansetron (ZOFRAN ODT) ODT tab 4 mg  4 mg Oral Q6H PRN Bairagi, Christopher B, MBBS        Or    ondansetron (ZOFRAN) injection 4 mg  4 mg Intravenous Q6H PRN Bairagi, Christopher B, MBBS        Patient is already receiving anticoagulation with heparin, enoxaparin (LOVENOX), warfarin (COUMADIN)  or other anticoagulant medication   Does not apply Continuous PRN Bairagi, Christopher B, MBBS        senna-docusate (SENOKOT-S/PERICOLACE) 8.6-50 MG per tablet 1 tablet  1 tablet Oral BID PRN Bairagi, Christopher B, MBBS        Or    senna-docusate (SENOKOT-S/PERICOLACE) 8.6-50 MG per tablet 2 tablet  2 tablet Oral BID PRN Bairagi, Christopher B, MBBS        sodium chloride (PF) 0.9% PF flush 3 mL  3 mL Intracatheter Q8H Bairagi, Christopher B, MBBS   3 mL at 12/23/23 0852    sodium chloride (PF) 0.9% PF flush 3 mL  3 mL Intracatheter q1 min prn Bairagi, Christopher B, MBBS        Vitamin D3 (CHOLECALCIFEROL) tablet 125 mcg  125 mcg Oral Daily Bairagi, Christopher B, MBBS   125 mcg at 12/23/23 0852     No current Epic-ordered outpatient medications on file.       Allergies   Allergen Reactions    Other Allergy (See Comments) [External Allergen Needs Reconciliation - See Comment] Unknown     Ointment Base External Ointment, 12/10/2007.  Action: RASH.; Ointment Base External Ointment, 12/10/2007.  Action: RASH.

## 2023-12-23 NOTE — PROGRESS NOTES
0930 anti XA 0.58. second result in goal range. No rate change per protocol. Recheck anti XA 12/24/23 0600 per protocol. MD notified.

## 2023-12-23 NOTE — PLAN OF CARE
Problem: Adult Inpatient Plan of Care  Goal: Absence of Hospital-Acquired Illness or Injury  Intervention: Identify and Manage Fall Risk  Recent Flowsheet Documentation  Taken 12/23/2023 0100 by Nella Cowan RN  Safety Promotion/Fall Prevention:   clutter free environment maintained   nonskid shoes/slippers when out of bed     Problem: Adult Inpatient Plan of Care  Goal: Absence of Hospital-Acquired Illness or Injury  Intervention: Prevent Skin Injury  Recent Flowsheet Documentation  Taken 12/23/2023 0100 by Nella Cowan RN  Body Position: position changed independently     Problem: Risk for Delirium  Goal: Improved Behavioral Control  Intervention: Minimize Safety Risk  Recent Flowsheet Documentation  Taken 12/23/2023 0100 by Nella Cowan RN  Communication Enhancement Strategies: communication board used  Enhanced Safety Measures: room near unit station     Problem: Risk for Delirium  Goal: Improved Attention and Thought Clarity  Intervention: Maximize Cognitive Function  Recent Flowsheet Documentation  Taken 12/23/2023 0100 by Nella Cowan RN  Reorientation Measures: clock in view   Goal Outcome Evaluation:       Patient alert and oriented x 4, denied any pain. No complain of shortness of breath. On 1 L oxygen via NC, oxygen saturation 97%. Heparin drip now at 13 ml/ hr, next Anti Xa at 0930. She ambulated to the bathroom, needs 1 person stand by.

## 2023-12-24 VITALS
WEIGHT: 169.75 LBS | HEIGHT: 61 IN | HEART RATE: 66 BPM | RESPIRATION RATE: 18 BRPM | TEMPERATURE: 97.7 F | DIASTOLIC BLOOD PRESSURE: 51 MMHG | OXYGEN SATURATION: 96 % | BODY MASS INDEX: 32.05 KG/M2 | SYSTOLIC BLOOD PRESSURE: 97 MMHG

## 2023-12-24 LAB
HOLD SPECIMEN: NORMAL
MAGNESIUM SERPL-MCNC: 2 MG/DL (ref 1.7–2.3)
POTASSIUM SERPL-SCNC: 3.4 MMOL/L (ref 3.4–5.3)
POTASSIUM SERPL-SCNC: 4.1 MMOL/L (ref 3.4–5.3)
UFH PPP CHRO-ACNC: 0.58 IU/ML

## 2023-12-24 PROCEDURE — 36415 COLL VENOUS BLD VENIPUNCTURE: CPT | Performed by: INTERNAL MEDICINE

## 2023-12-24 PROCEDURE — 250N000013 HC RX MED GY IP 250 OP 250 PS 637: Performed by: INTERNAL MEDICINE

## 2023-12-24 PROCEDURE — 250N000011 HC RX IP 250 OP 636: Mod: JZ | Performed by: FAMILY MEDICINE

## 2023-12-24 PROCEDURE — 84132 ASSAY OF SERUM POTASSIUM: CPT | Performed by: INTERNAL MEDICINE

## 2023-12-24 PROCEDURE — 99239 HOSP IP/OBS DSCHRG MGMT >30: CPT | Performed by: INTERNAL MEDICINE

## 2023-12-24 PROCEDURE — 83735 ASSAY OF MAGNESIUM: CPT | Performed by: INTERNAL MEDICINE

## 2023-12-24 PROCEDURE — 85520 HEPARIN ASSAY: CPT | Performed by: INTERNAL MEDICINE

## 2023-12-24 RX ORDER — HYDROCHLOROTHIAZIDE 25 MG/1
25 TABLET ORAL DAILY
Status: DISCONTINUED | OUTPATIENT
Start: 2023-12-25 | End: 2023-12-24 | Stop reason: HOSPADM

## 2023-12-24 RX ORDER — HYDROCHLOROTHIAZIDE 12.5 MG/1
12.5 TABLET ORAL DAILY
Status: DISCONTINUED | OUTPATIENT
Start: 2023-12-25 | End: 2023-12-24

## 2023-12-24 RX ORDER — ROSUVASTATIN CALCIUM 20 MG/1
20 TABLET, COATED ORAL DAILY
Qty: 30 TABLET | Refills: 0 | Status: SHIPPED | OUTPATIENT
Start: 2023-12-25 | End: 2024-01-15

## 2023-12-24 RX ORDER — POTASSIUM CHLORIDE 1500 MG/1
40 TABLET, EXTENDED RELEASE ORAL ONCE
Status: COMPLETED | OUTPATIENT
Start: 2023-12-24 | End: 2023-12-24

## 2023-12-24 RX ADMIN — ROSUVASTATIN CALCIUM 20 MG: 10 TABLET, FILM COATED ORAL at 09:07

## 2023-12-24 RX ADMIN — Medication 81 MG: at 09:08

## 2023-12-24 RX ADMIN — HYDROCHLOROTHIAZIDE 25 MG: 25 TABLET ORAL at 09:07

## 2023-12-24 RX ADMIN — FERROUS SULFATE TAB 325 MG (65 MG ELEMENTAL FE) 325 MG: 325 (65 FE) TAB at 09:07

## 2023-12-24 RX ADMIN — Medication 125 MCG: at 09:08

## 2023-12-24 RX ADMIN — HEPARIN SODIUM 1300 UNITS/HR: 10000 INJECTION, SOLUTION INTRAVENOUS at 01:44

## 2023-12-24 RX ADMIN — LOSARTAN POTASSIUM 25 MG: 25 TABLET, FILM COATED ORAL at 09:07

## 2023-12-24 RX ADMIN — POTASSIUM CHLORIDE 40 MEQ: 1500 TABLET, EXTENDED RELEASE ORAL at 09:07

## 2023-12-24 RX ADMIN — RIVAROXABAN 15 MG: 15 TABLET, FILM COATED ORAL at 12:18

## 2023-12-24 ASSESSMENT — ACTIVITIES OF DAILY LIVING (ADL)
ADLS_ACUITY_SCORE: 22
DEPENDENT_IADLS:: INDEPENDENT

## 2023-12-24 NOTE — PROGRESS NOTES
Care Management Discharge Note    Discharge Date: 12/24/2023       Discharge Disposition: Home    Discharge Services: None    Discharge DME: None    Discharge Transportation: family or friend will provide    Private pay costs discussed: Not applicable    Does the patient's insurance plan have a 3 day qualifying hospital stay waiver?  Yes     Which insurance plan 3 day waiver is available? Alternative insurance waiver    Will the waiver be used for post-acute placement? No    PAS Confirmation Code: NA  Patient/family educated on Medicare website which has current facility and service quality ratings:      Education Provided on the Discharge Plan: Yes  Persons Notified of Discharge Plans: patient,   Patient/Family in Agreement with the Plan: yes    Handoff Referral Completed: Yes    Additional Information:  Patient discharging home with her .  No CM needs expressed or identified.     Rhianna Morris RN

## 2023-12-24 NOTE — PROGRESS NOTES
Chart check  Echo shows no significant RV dysfunction or pulmonary hypertension.  No segmental wall motion abnormalities.    No new cardiac recommendations    Cardiology will sign off

## 2023-12-24 NOTE — CONSULTS
Care Management Initial Consult    General Information  Assessment completed with: Dandy Perry  Type of CM/SW Visit: Initial Assessment    Primary Care Provider verified and updated as needed: Yes   Readmission within the last 30 days: no previous admission in last 30 days      Reason for Consult: discharge planning  Advance Care Planning: Advance Care Planning Reviewed: no concerns identified          Communication Assessment  Patient's communication style: spoken language (English or Bilingual)    Hearing Difficulty or Deaf: no   Wear Glasses or Blind: yes    Cognitive  Cognitive/Neuro/Behavioral: WDL                      Living Environment:   People in home: spouse  Giuseppe  Current living Arrangements: independent living facility      Able to return to prior arrangements: yes       Family/Social Support:  Care provided by: self, spouse/significant other  Provides care for: no one  Marital Status:     Giuseppe       Description of Support System: Supportive, Involved    Support Assessment: Adequate family and caregiver support, Adequate social supports    Current Resources:   Patient receiving home care services: No     Community Resources: None  Equipment currently used at home: none  Supplies currently used at home: Other (CPAP)    Employment/Financial:  Employment Status: retired        Financial Concerns: none           Does the patient's insurance plan have a 3 day qualifying hospital stay waiver?  Yes     Which insurance plan 3 day waiver is available? Alternative insurance waiver    Will the waiver be used for post-acute placement? No    Lifestyle & Psychosocial Needs:  Social Determinants of Health     Food Insecurity: Not on file   Depression: Not at risk (8/17/2023)    PHQ-2     PHQ-2 Score: 0   Housing Stability: Not on file   Tobacco Use: Low Risk  (12/22/2023)    Patient History     Smoking Tobacco Use: Never     Smokeless Tobacco Use: Never     Passive Exposure: Not on file   Financial  Resource Strain: Not on file   Alcohol Use: Not on file   Transportation Needs: Not on file   Physical Activity: Not on file   Interpersonal Safety: Not on file   Stress: Not on file   Social Connections: Not on file       Functional Status:  Prior to admission patient needed assistance:   Dependent ADLs:: Independent  Dependent IADLs:: Independent       Mental Health Status:  Mental Health Status: No Current Concerns       Chemical Dependency Status:  Chemical Dependency Status: No Current Concerns             Values/Beliefs:  Spiritual, Cultural Beliefs, Restorationist Practices, Values that affect care:       NA          Additional Information:  CM met with patient in patient's room.  Introduced self and identified role.    Patient lives in an independent [facility] living [facility], [facility], with her .  Patient is independent with all I/ADLS and drives.  No home care or community services.  Has CPAP.     Patient denies CM needs.    Rhianna Morris RN

## 2023-12-24 NOTE — PLAN OF CARE
Problem: Adult Inpatient Plan of Care  Goal: Plan of Care Review  Description: The Plan of Care Review/Shift note should be completed every shift.  The Outcome Evaluation is a brief statement about your assessment that the patient is improving, declining, or no change.  This information will be displayed automatically on your shift  note.  Outcome: Progressing  Goal: Absence of Hospital-Acquired Illness or Injury  Outcome: Progressing  Intervention: Identify and Manage Fall Risk  Recent Flowsheet Documentation  Taken 12/23/2023 1555 by Arminda Hernandez, RN  Safety Promotion/Fall Prevention:   nonskid shoes/slippers when out of bed   clutter free environment maintained  Intervention: Prevent Skin Injury  Recent Flowsheet Documentation  Taken 12/23/2023 1555 by Arminda Hernandez, RN  Body Position: position changed independently  Intervention: Prevent Infection  Recent Flowsheet Documentation  Taken 12/23/2023 1555 by Arminda Hernandez, RN  Infection Prevention: hand hygiene promoted  Goal: Optimal Comfort and Wellbeing  Outcome: Progressing     Goal Outcome Evaluation:       Patient is alert and oriented and able to make needs known. Patient on 1L due to shortness of breath, improving per patient. Patient has heparin drip running at 1,300 units/ml. Patient up with SBA.

## 2023-12-24 NOTE — DISCHARGE SUMMARY
[facility] MEDICINE  DISCHARGE SUMMARY     Primary Care Physician: Suman Doshi  Admission Date: 12/22/2023   Discharge Provider: Maxime Guajardo MD Discharge Date: 12/24/2023   Diet:   Active Diet and Nourishment Order   Procedures    Combination Diet; low-salt and fat diet r           Code Status: Full Code   Activity: DCACTIVITY: Activity as tolerated        Condition at Discharge: Good     REASON FOR PRESENTATION(See Admission Note for Details)   Dyspnea on exertion, fatigue.  Please refer to H&P for details    PRINCIPAL & ACTIVE DISCHARGE DIAGNOSES     Principal Problem:    Acute pulmonary embolism with acute cor pulmonale, unspecified pulmonary embolism type (H)  Active Problems:    Dyspnea on exertion      PENDING LABS     Unresulted Labs Ordered in the Past 30 Days of this Admission       No orders found from 11/22/2023 to 12/23/2023.              PROCEDURES ( this hospitalization only)          RECOMMENDATIONS TO OUTPATIENT PROVIDER FOR F/U VISIT     Follow-up Appointments     Follow-up and recommended labs and tests       Follow up with primary care provider, Suman Doshi, within 7 days to   evaluate medication change, for hospital follow- up, and medications   refill, referral to Hematologist for pulmonary embolism and   anticoagulation management.  The following labs/tests are recommended:   cbc, BMP, Magnesium.                DISPOSITION     Home    SUMMARY OF HOSPITAL COURSE:      Patient is a 68 year old female with PMH significant for hypertension and remote h/o RLE DVT who presented to our ED for evaluation of ROCK and fatigue.  Patient found to have pulmonary embolism and admitted for further management.     Bilateral pulmonary embolism with right ventricular strain on CT;  --CT imaging reported acute segmental and subsegmental emboli bilaterally with secondary signs of right ventricular strain.  ED provider discussed with IR and reported clots are too distal for  thrombectomy    --Echo reported EF 60 to 65%, normal right ventricular systolic pressure, no hemodynamically significant valvular abnormalities.  -- Unclear on etiology of PE, US BLE negative for DVT. No recent travel, no known h/o malignancy. Patient reports DVT RLE >40 years ago. Also reports that her mother had PE when she was in her 60s. Recommend outpatient work up and sent referral to hematology on discharge.    -- Discussed with pharmacist, reported Eliquis cost around $100 and Xarelto around $50.  Discussed with patient that she is willing to pay $50 out-of-pocket and also understands that out-of-pocket cost may change next year.  Our pharmacy reported patient can get Xarelto starter pack free from our pharmacy, prescription sent.  Other prescription of Xarelto 20 mg daily sent to patient primary pharmacy.  --Informed patient that PCP and hematology will determine duration for anticoagulation as an outpatient.     Elevated troponin; likely due to above  -- Appreciate cardiology input, reported mild troponin elevation secondary to RV strain from pulmonary embolism.  Echocardiogram fairly unremarkable and reported no further cardiac workup.    Hyperlipidemia;  -- Cardiology initiated patient on statin, continue     Essential hypertension;  -- Fairly controlled.  PTA medications    Hypokalemia likely due to recent diarrhea  -- Currently patient reported diarrhea resolved.  Replaced potassium    Discharge Medications with Med changes:     Current Discharge Medication List        START taking these medications    Details   rivaroxaban ANTICOAGULANT (XARELTO) 20 MG TABS tablet Take 1 tablet (20 mg) by mouth daily (with dinner) Start on 1/23/2024 after finishing Xarelto starter therapy pack provided by [facility] pharmacy. Please call PCP for refills  Qty: 60 tablet, Refills: 0    Associated Diagnoses: Hyperlipidemia LDL goal <100      Rivaroxaban ANTICOAGULANT 15 & 20 MG TBPK Starter Therapy Pack Take 15  mg by mouth 2 times daily (with meals) for 21 days, THEN 20 mg daily with food for 9 days.  Qty: 51 each, Refills: 0    Associated Diagnoses: Acute pulmonary embolism with acute cor pulmonale, unspecified pulmonary embolism type (H)      rosuvastatin (CRESTOR) 20 MG tablet Take 1 tablet (20 mg) by mouth daily Please call PCP for refills  Qty: 30 tablet, Refills: 0    Associated Diagnoses: Hyperlipidemia LDL goal <100           CONTINUE these medications which have NOT CHANGED    Details   Calcium Carbonate-Vit D-Min (CALCIUM 1200 PO) Take 1 tablet by mouth 2 times daily      cholecalciferol, vitamin D3, 5,000 unit Tab [CHOLECALCIFEROL, VITAMIN D3, 5,000 UNIT TAB] Take 1 tablet by mouth daily.       ferrous sulfate 325 (65 FE) MG tablet [FERROUS SULFATE 325 (65 FE) MG TABLET] Take 325 mg by mouth.      fish oil-omega-3 fatty acids 1000 MG capsule Take 2 g by mouth daily      hydrochlorothiazide (HYDRODIURIL) 25 MG tablet Take 1 tablet (25 mg) by mouth daily  Qty: 90 tablet, Refills: 3    Associated Diagnoses: Essential hypertension      losartan (COZAAR) 25 MG tablet Take 1 tablet (25 mg) by mouth 2 times daily  Qty: 180 tablet, Refills: 3    Associated Diagnoses: Essential hypertension      Probiotic Product (DAILY DIGESTIVE PROBIOTIC PO) Take 1 capsule by mouth daily      triamcinolone (KENALOG) 0.1 % cream [TRIAMCINOLONE (KENALOG) 0.1 % CREAM] Apply to affected areas 3 times a day for 2 weeks  Qty: 45 g, Refills: 1    Associated Diagnoses: Eczema, unspecified type           STOP taking these medications       aspirin 81 MG EC tablet Comments:   Reason for Stopping:                     Rationale for medication changes:      PTA baby aspirin discontinued as patient denied history of CAD and initiated on Xarelto which increases risk of bleeding.  This was discussed with patient        Consults       PHARMACY IP CONSULT  CARE MANAGEMENT / SOCIAL WORK IP CONSULT  CARDIOLOGY IP CONSULT  PHARMACY IP  CONSULT    Immunizations given this encounter     Most Recent Immunizations   Administered Date(s) Administered    COVID-19 12+ (2023-24) (MODERNA) 11/18/2023    COVID-19 Monovalent 18+ (Moderna) 08/01/2022    DT (PEDS <7y) 02/05/2004    Flu, Unspecified 03/15/2018    Influenza (H1N1) 01/13/2010    Influenza (High Dose) 3 valent vaccine 09/21/2020    Influenza (IIV3) PF 10/23/2014    Influenza Vaccine 65+ (Fluzone HD) 11/02/2023    Influenza Vaccine >6 months,quad, PF 10/25/2019    Influenza Vaccine, 6+MO IM (QUADRIVALENT W/PRESERVATIVES) 10/16/2017    Influenza, seasonal, injectable, PF 11/14/2012    Pneumo Conj 13-V (2010&after) 03/15/2018    Pneumococcal 23 valent 03/28/2019    TDAP (Adacel,Boostrix) 01/08/2013    Td (Adult), Adsorbed 01/08/2013    Td,adult,historic,unspecified 01/08/2013           Anticoagulation Information          SIGNIFICANT IMAGING FINDINGS     Results for orders placed or performed during the hospital encounter of 12/22/23   CT Chest Pulmonary Embolism w Contrast   Result Value Ref Range    Radiologist flags New diagnosis of pulmonary embolism (AA)     Impression    IMPRESSION:  Acute segmental and subsegmental emboli bilaterally with secondary signs of right heart strain. No evidence of pulmonary infarct.    [Critical Result: New diagnosis of pulmonary embolism]    Finding was identified on 12/22/2023 1:43 PM CST.     Dr. Stallings was contacted by me on 12/22/2023 1:47 PM CST and verbalized understanding of the critical result.   US Lower Extremity Venous Duplex Bilateral    Impression    IMPRESSION:  1.  No deep venous thrombosis in the bilateral lower extremities.  2.  Bilateral lower extremity subcutaneous edema.   Echocardiogram Complete   Result Value Ref Range    LVEF  60-65%        SIGNIFICANT LABORATORY FINDINGS     Most Recent 3 CBC's:  Recent Labs   Lab Test 12/23/23  0602 12/22/23  1152 12/17/23  1553   WBC 5.5 6.4 9.2   HGB 11.8 13.4 13.6   MCV 94 94 93    264 357      Most Recent 3 BMP's:  Recent Labs   Lab Test 12/24/23  1226 12/24/23  0624 12/23/23  1327 12/23/23  0602 12/22/23  1152 12/17/23  1414   NA  --   --   --  134* 135 134*   POTASSIUM 4.1 3.4 4.1 3.2* 3.9 3.1*   CHLORIDE  --   --   --  100 99 96*   CO2  --   --   --  29 30* 28   BUN  --   --   --  10.9 14.1 13.9   CR  --   --   --  0.60 0.80 0.76   ANIONGAP  --   --   --  5* 6* 10   ZACHARY  --   --   --  7.7* 8.1* 8.3*   GLC  --   --   --  117* 97 107*       Discharge Orders        Adult Oncology/Hematology [facility] Referral      Reason for your hospital stay    Patient admitted for pulmonary embolism     Follow-up and recommended labs and tests     Follow up with primary care provider, Suman Doshi, within 7 days to evaluate medication change, for hospital follow- up, and medications refill, referral to Hematologist for pulmonary embolism and anticoagulation management.  The following labs/tests are recommended: cbc, BMP, Magnesium.     Activity    Your activity upon discharge: activity as tolerated     Diet    Follow this diet upon discharge: Orders Placed This Encounter   Low salt and fat Diet       Examination   Physical Exam   Temp:  [97.7  F (36.5  C)-98.1  F (36.7  C)] 97.7  F (36.5  C)  Pulse:  [66-79] 66  Resp:  [17-18] 18  BP: ()/(51-65) 97/51  SpO2:  [96 %-97 %] 96 %  Wt Readings from Last 1 Encounters:   12/22/23 77 kg (169 lb 12.1 oz)       Patient seen and examined by this examiner for the first time on the day of discharge.  Patient denied feeling short of breath or chest pain or dizziness when ambulating.  Denied cough.  Patient reported recently having diarrhea and now resolved.  Denied abdomen pain, nausea, vomiting.  Patient feels comfortable going home.  Discussed with nursing staff.  Discussed with pharmacist.  Detailed plan of care after discharge discussed with patient.      General: Not in obvious distress.  HEENT: Normocephalic, supple neck  Chest: Clear to auscultation bilateral  anteriorly, no wheezing  Heart: S1S2 normal, regular  Abdomen: Soft. NT, ND. Bowel sounds- active.  Neuro: alert and awake, grossly non-focal          Please see EMR for more detailed significant labs, imaging, consultant notes etc.    IMaxime MD, personally saw the patient today and spent greater than 30 minutes discharging this patient.    Maxime Guajardo MD  [facility]    CC:Suman Doshi

## 2023-12-26 ENCOUNTER — PATIENT OUTREACH (OUTPATIENT)
Dept: CARE COORDINATION | Facility: CLINIC | Age: 68
End: 2023-12-26
Payer: COMMERCIAL

## 2023-12-26 NOTE — PROGRESS NOTES
Clinic Care Coordination Contact  [facility]: Post-Discharge Note  SITUATION                                                      Admission:    Admission Date: 12/22/23   Reason for Admission: Shortness of Breath  Discharge:   Discharge Date: 12/24/23  Discharge Diagnosis: Principal Problem:    Acute pulmonary embolism with acute cor pulmonale, unspecified pulmonary embolism type (H)  Active Problems:    Dyspnea on exertion    BACKGROUND                                                      Per hospital discharge summary and inpatient provider notes:  Patient is a 68 year old female with PMH significant for hypertension and remote h/o RLE DVT who presented to our ED for evaluation of ROCK and fatigue.  Patient found to have pulmonary embolism and admitted for further management.     Bilateral pulmonary embolism with right ventricular strain on CT;  --CT imaging reported acute segmental and subsegmental emboli bilaterally with secondary signs of right ventricular strain.  ED provider discussed with IR and reported clots are too distal for thrombectomy     --Echo reported EF 60 to 65%, normal right ventricular systolic pressure, no hemodynamically significant valvular abnormalities.  -- Unclear on etiology of PE, US BLE negative for DVT. No recent travel, no known h/o malignancy. Patient reports DVT RLE >40 years ago. Also reports that her mother had PE when she was in her 60s. Recommend outpatient work up and sent referral to hematology on discharge.    -- Discussed with pharmacist, reported Eliquis cost around $100 and Xarelto around $50.  Discussed with patient that she is willing to pay $50 out-of-pocket and also understands that out-of-pocket cost may change next year.  Our pharmacy reported patient can get Xarelto starter pack free from our pharmacy, prescription sent.  Other prescription of Xarelto 20 mg daily sent to patient primary pharmacy.  --Informed patient that PCP and hematology will determine  duration for anticoagulation as an outpatient.     Elevated troponin; likely due to above  -- Appreciate cardiology input, reported mild troponin elevation secondary to RV strain from pulmonary embolism.  Echocardiogram fairly unremarkable and reported no further cardiac workup.     Hyperlipidemia;  -- Cardiology initiated patient on statin, continue     Essential hypertension;  -- Fairly controlled.  PTA medications     Hypokalemia likely due to recent diarrhea  -- Currently patient reported diarrhea resolved.  Replaced potassium    ASSESSMENT           Discharge Assessment  How are you doing now that you are home?: doing ok - having to really take things slow, feeking tired. Have all medications, eating well, staying hydrated. Got really bad charliehorse in both legs last night but no concerns or questions  How are your symptoms? (Red Flag symptoms escalate to triage hotline per guidelines): Improved  Do you feel your condition is stable enough to be safe at home until your provider visit?: Yes  Does the patient have their discharge instructions? : Yes  Does the patient have questions regarding their discharge instructions? : No  Were you started on any new medications or were there changes to any of your previous medications? : Yes  Does the patient have all of their medications?: Yes  Do you have questions regarding any of your medications? : No  Discharge follow-up appointment scheduled within 14 calendar days? : Yes  Discharge Follow Up Appointment Date: 01/02/24  Discharge Follow Up Appointment Scheduled with?: Primary Care Provider         Post-op (Clinicians Only)  Did the patient have surgery or a procedure: No  Fever: No  Chills: No  Eating & Drinking: eating and drinking without complaints/concerns  PO Intake: low fat diet;other (low salt)        PLAN                                                      Outpatient Plan:  [facility] and pt spoke about how things are going. Pt noted things are ok, they are  needing to 'go slow' as they are still getting easily winded. Pt has all medications and is trying to keep hydrated: pt has had a mix of liquids such as water, coffee, liquid IV/gatorade. Pt stated they have no questions or concerns; they are not interested in CCC at this time. Program closed, no additional outreach will be provided. [facility] confirmed pt's upcoming appointments and pt is aware - pt is needing to cancel an upcoming surgery and confirmed they will do this on their own. Pt agreed to reach out to clinic  if there are any additional concerns or questions.     Future Appointments   Date Time Provider Department Center   1/2/2024  1:40 PM Suman Doshi MD [facility]   1/3/2024  2:00 PM Suman Doshi MD [facility]   8/20/2024 11:00 AM Suman Doshi MD [facility]         For any urgent concerns, please contact our 24 hour nurse triage line: [phone] ([phone])         EDGARD Cates

## 2024-01-03 ENCOUNTER — OFFICE VISIT (OUTPATIENT)
Dept: INTERNAL MEDICINE | Facility: CLINIC | Age: 69
End: 2024-01-03
Payer: COMMERCIAL

## 2024-01-03 VITALS
BODY MASS INDEX: 35.5 KG/M2 | WEIGHT: 188 LBS | RESPIRATION RATE: 18 BRPM | OXYGEN SATURATION: 97 % | DIASTOLIC BLOOD PRESSURE: 62 MMHG | TEMPERATURE: 97.5 F | SYSTOLIC BLOOD PRESSURE: 107 MMHG | HEIGHT: 61 IN | HEART RATE: 83 BPM

## 2024-01-03 DIAGNOSIS — I25.10 CORONARY ARTERY CALCIFICATION SEEN ON CAT SCAN: ICD-10-CM

## 2024-01-03 DIAGNOSIS — E53.8 VITAMIN B12 DEFICIENCY (NON ANEMIC): ICD-10-CM

## 2024-01-03 DIAGNOSIS — I10 ESSENTIAL HYPERTENSION: ICD-10-CM

## 2024-01-03 DIAGNOSIS — E87.6 HYPOKALEMIA: ICD-10-CM

## 2024-01-03 DIAGNOSIS — I26.09 ACUTE PULMONARY EMBOLISM WITH ACUTE COR PULMONALE, UNSPECIFIED PULMONARY EMBOLISM TYPE (H): Primary | ICD-10-CM

## 2024-01-03 DIAGNOSIS — G47.33 OBSTRUCTIVE SLEEP APNEA ON CPAP: ICD-10-CM

## 2024-01-03 DIAGNOSIS — R79.89 ELEVATED TROPONIN: ICD-10-CM

## 2024-01-03 LAB
ANION GAP SERPL CALCULATED.3IONS-SCNC: 13 MMOL/L (ref 7–15)
BUN SERPL-MCNC: 8.6 MG/DL (ref 8–23)
CALCIUM SERPL-MCNC: 8 MG/DL (ref 8.8–10.2)
CHLORIDE SERPL-SCNC: 99 MMOL/L (ref 98–107)
CREAT SERPL-MCNC: 0.69 MG/DL (ref 0.51–0.95)
DEPRECATED HCO3 PLAS-SCNC: 24 MMOL/L (ref 22–29)
EGFRCR SERPLBLD CKD-EPI 2021: >90 ML/MIN/1.73M2
GLUCOSE SERPL-MCNC: 93 MG/DL (ref 70–99)
POTASSIUM SERPL-SCNC: 4.2 MMOL/L (ref 3.4–5.3)
SODIUM SERPL-SCNC: 136 MMOL/L (ref 135–145)

## 2024-01-03 PROCEDURE — 36415 COLL VENOUS BLD VENIPUNCTURE: CPT | Performed by: INTERNAL MEDICINE

## 2024-01-03 PROCEDURE — 99495 TRANSJ CARE MGMT MOD F2F 14D: CPT | Performed by: INTERNAL MEDICINE

## 2024-01-03 PROCEDURE — 96372 THER/PROPH/DIAG INJ SC/IM: CPT | Performed by: INTERNAL MEDICINE

## 2024-01-03 PROCEDURE — 80048 BASIC METABOLIC PNL TOTAL CA: CPT | Performed by: INTERNAL MEDICINE

## 2024-01-03 RX ORDER — LOSARTAN POTASSIUM 25 MG/1
25 TABLET ORAL DAILY
Start: 2024-01-03 | End: 2024-04-05

## 2024-01-03 RX ADMIN — CYANOCOBALAMIN 1000 MCG: 1000 INJECTION, SOLUTION INTRAMUSCULAR; SUBCUTANEOUS at 14:46
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
## 2024-01-03 NOTE — PATIENT INSTRUCTIONS
Continue on Xarelto blood thinner for at least 1 year.  You may consider lifelong anticoagulation treatment.  See hematology next month as scheduled to have a hypercoagulable workup.    Xarelto is a major blood thinner with bleeding risk.  If you hit your head significantly, go directly to the emergency room to get a head CT scan to rule out bleed.  If you have bleeding that does not stop, go to the emergency room.    Continue on the lower dose losartan once a day.  Goal blood pressure less than 135/85 but not less than 110/60.  If your blood pressure gets less than 100/50 or significant lightheaded dizzy spells, you may need to stop losartan temporarily entirely.    But taking hydrochlorothiazide all by itself sometimes can lead to low potassium.  You may need to take a potassium supplement.  You will be checking potassium level today.    You will need to delay any knee surgery for at least 1 year, because of your blood clot.    See me in approximately 3 months to reevaluate your blood pressure, discussed plan for the blood thinner, and review your treatment plan.    Make an appointment with cardiology to discuss further evaluation for coronary disease.  You may consider undergoing stress testing or further evaluation of your heart with a cardiac MRI, but discuss that with cardiology.    If you do have recurrent chest pain or significant worsening shortness of breath, return to the emergency room for immediate evaluation.

## 2024-01-15 DIAGNOSIS — E78.5 HYPERLIPIDEMIA LDL GOAL <100: ICD-10-CM

## 2024-01-16 RX ORDER — ROSUVASTATIN CALCIUM 20 MG/1
20 TABLET, COATED ORAL DAILY
Qty: 90 TABLET | Refills: 0 | Status: SHIPPED | OUTPATIENT
Start: 2024-01-16 | End: 2024-04-22

## 2024-01-29 ENCOUNTER — OFFICE VISIT (OUTPATIENT)
Dept: HEMATOLOGY | Facility: CLINIC | Age: 69
End: 2024-01-29
Attending: INTERNAL MEDICINE
Payer: COMMERCIAL

## 2024-01-29 VITALS
HEART RATE: 69 BPM | BODY MASS INDEX: 35.26 KG/M2 | OXYGEN SATURATION: 97 % | SYSTOLIC BLOOD PRESSURE: 143 MMHG | DIASTOLIC BLOOD PRESSURE: 69 MMHG | TEMPERATURE: 97.4 F | WEIGHT: 186.6 LBS

## 2024-01-29 DIAGNOSIS — E87.6 HYPOKALEMIA: ICD-10-CM

## 2024-01-29 DIAGNOSIS — Z98.84 GASTRIC BYPASS STATUS FOR OBESITY: Primary | ICD-10-CM

## 2024-01-29 DIAGNOSIS — I26.09 ACUTE PULMONARY EMBOLISM WITH ACUTE COR PULMONALE, UNSPECIFIED PULMONARY EMBOLISM TYPE (H): ICD-10-CM

## 2024-01-29 DIAGNOSIS — E83.42 HYPOMAGNESEMIA: ICD-10-CM

## 2024-01-29 PROBLEM — E66.01 CLASS 2 SEVERE OBESITY DUE TO EXCESS CALORIES WITH SERIOUS COMORBIDITY IN ADULT (H): Status: ACTIVE | Noted: 2024-01-29

## 2024-01-29 PROBLEM — E66.812 CLASS 2 SEVERE OBESITY DUE TO EXCESS CALORIES WITH SERIOUS COMORBIDITY IN ADULT (H): Status: ACTIVE | Noted: 2024-01-29

## 2024-01-29 LAB
ALBUMIN SERPL BCG-MCNC: 3.7 G/DL (ref 3.5–5.2)
ALP SERPL-CCNC: 46 U/L (ref 40–150)
ALT SERPL W P-5'-P-CCNC: 33 U/L (ref 0–50)
ANION GAP SERPL CALCULATED.3IONS-SCNC: 8 MMOL/L (ref 7–15)
AST SERPL W P-5'-P-CCNC: 32 U/L (ref 0–45)
BILIRUB SERPL-MCNC: 0.3 MG/DL
BUN SERPL-MCNC: 9.2 MG/DL (ref 8–23)
CALCIUM SERPL-MCNC: 9.2 MG/DL (ref 8.8–10.2)
CHLORIDE SERPL-SCNC: 98 MMOL/L (ref 98–107)
CREAT SERPL-MCNC: 0.74 MG/DL (ref 0.51–0.95)
DEPRECATED HCO3 PLAS-SCNC: 31 MMOL/L (ref 22–29)
EGFRCR SERPLBLD CKD-EPI 2021: 86 ML/MIN/1.73M2
ERYTHROCYTE [DISTWIDTH] IN BLOOD BY AUTOMATED COUNT: 12.1 % (ref 10–15)
GLUCOSE SERPL-MCNC: 99 MG/DL (ref 70–99)
HCT VFR BLD AUTO: 39.3 % (ref 35–47)
HGB BLD-MCNC: 13 G/DL (ref 11.7–15.7)
MAGNESIUM SERPL-MCNC: 2.1 MG/DL (ref 1.7–2.3)
MCH RBC QN AUTO: 32.4 PG (ref 26.5–33)
MCHC RBC AUTO-ENTMCNC: 33.1 G/DL (ref 31.5–36.5)
MCV RBC AUTO: 98 FL (ref 78–100)
PLATELET # BLD AUTO: 319 10E3/UL (ref 150–450)
POTASSIUM SERPL-SCNC: 4 MMOL/L (ref 3.4–5.3)
PROT SERPL-MCNC: 5.8 G/DL (ref 6.4–8.3)
RBC # BLD AUTO: 4.01 10E6/UL (ref 3.8–5.2)
SODIUM SERPL-SCNC: 137 MMOL/L (ref 135–145)
WBC # BLD AUTO: 6.3 10E3/UL (ref 4–11)

## 2024-01-29 PROCEDURE — 85027 COMPLETE CBC AUTOMATED: CPT | Performed by: PHYSICIAN ASSISTANT

## 2024-01-29 PROCEDURE — 80053 COMPREHEN METABOLIC PANEL: CPT | Performed by: PHYSICIAN ASSISTANT

## 2024-01-29 PROCEDURE — 36415 COLL VENOUS BLD VENIPUNCTURE: CPT | Performed by: PHYSICIAN ASSISTANT

## 2024-01-29 PROCEDURE — G0463 HOSPITAL OUTPT CLINIC VISIT: HCPCS | Performed by: PHYSICIAN ASSISTANT

## 2024-01-29 PROCEDURE — 99205 OFFICE O/P NEW HI 60 MIN: CPT | Performed by: PHYSICIAN ASSISTANT

## 2024-01-29 PROCEDURE — 83735 ASSAY OF MAGNESIUM: CPT | Performed by: PHYSICIAN ASSISTANT

## 2024-01-29 PROCEDURE — 80299 QUANTITATIVE ASSAY DRUG: CPT | Performed by: PHYSICIAN ASSISTANT
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
## 2024-01-29 NOTE — PATIENT INSTRUCTIONS
[facility]  [facility]  [facility], [place] [address]  Main: [phone], Fax: [phone]    Dandy,  It was a pleasure seeing you today.  Thank you for allowing us to be involved in your care.  Please let us know if there is anything else we can do for you, so that we can be sure you are leaving completely satisfied with your care experience.    Labs today.  Our lab is down the hallway in our clinic space.  We will communicate these to you by SleepOut. With your permission we may leave a detailed voicemail, please see below for our contact information should you have any questions about your results. Also, please note that some lab results may take a week or so to get back. Feel free to call or message if you have not heard back from us within 7-10 days.    For your upcoming procedure/surgery - please go ahead and reschedule your surgery for 6 months from date of your diagnosis of your clot ~ 6/20/2024. For surgery-- please hold blood thinner for 48 hours prior to your procedure and restart it 12-24 hours after with surgeon approval.    Please stay on your blood thinner:  Xarelto. I will contact you if your labs suggest we should switch you to Eliquis.  Please call us with any bleeding issues that you may have.    Wear compression stockings if you have swelling in your leg. Take them off while you are sleeping. You may need to get new stockings every 3-6 months with regular wear.    Patient Education & Resources:  For additional information, please see the following web links:  www.stoptheclot.org, www.clotconnect.org.    Call the [facility]  at [phone].     -If surgeries or procedures are planned (for holding instructions).     -If off anticoagulation, please call during high risk times (long-distance travel, broken bones or trauma, immobilization, surgery, pregnancy, or taking estrogen).     -Any  new symptoms of DVT (deep vein thrombosis) or PE (pulmonary embolism)    -pain     -swelling     -redness    -warmth    -shortness of breath    -chest pain    -coughing up blood    We would like a provider on our team to see you at least annually for optimal care and to allow us to continue to prescribe for you.     Return to clinic in 5 months to finalize surgery plan and discuss long term blood thinner plan.     Your nurse clinician is Kristin [phone] .   If they are unavailable and you have immediate concerns, please call [phone] and ask for a nurse.        Jyoti Figueroa, MPH, PA-C  [facility]    -

## 2024-01-30 LAB — RIVAROXABAN PPP CHRO-MCNC: 183 NG/ML

## 2024-02-02 ENCOUNTER — PATIENT OUTREACH (OUTPATIENT)
Dept: GASTROENTEROLOGY | Facility: CLINIC | Age: 69
End: 2024-02-02
Payer: COMMERCIAL

## 2024-02-05 ENCOUNTER — ALLIED HEALTH/NURSE VISIT (OUTPATIENT)
Dept: FAMILY MEDICINE | Facility: CLINIC | Age: 69
End: 2024-02-05
Payer: COMMERCIAL

## 2024-02-05 DIAGNOSIS — E53.8 VITAMIN B12 DEFICIENCY (NON ANEMIC): Primary | ICD-10-CM

## 2024-02-05 PROCEDURE — 99207 PR NO CHARGE NURSE ONLY: CPT

## 2024-02-05 PROCEDURE — 96372 THER/PROPH/DIAG INJ SC/IM: CPT | Performed by: INTERNAL MEDICINE

## 2024-02-05 RX ADMIN — CYANOCOBALAMIN 1000 MCG: 1000 INJECTION, SOLUTION INTRAMUSCULAR; SUBCUTANEOUS at 11:01

## 2024-02-05 NOTE — PROGRESS NOTES
Clinic Administered Medication Documentation      Injectable Medication Documentation    Is there an active order (written within the past 365 days, with administrations remaining, not ) in the chart? Yes.     Patient was given Cyanocobalamin (B-12). Prior to medication administration, verified patient's identity using patient s name and date of birth. Please see MAR and medication order for additional information. Patient instructed to remain in clinic for 15 minutes and report any adverse reaction to staff immediately.    Vial/Syringe: Single dose vial. Was entire vial of medication used? Yes  Was this medication supplied by the patient? No  Is this a medication the patient will need to receive again? Yes. Verified that the patient has refills remaining in their prescription..

## 2024-02-26 ENCOUNTER — OFFICE VISIT (OUTPATIENT)
Dept: CARDIOLOGY | Facility: CLINIC | Age: 69
End: 2024-02-26
Attending: INTERNAL MEDICINE
Payer: COMMERCIAL

## 2024-02-26 VITALS
OXYGEN SATURATION: 97 % | SYSTOLIC BLOOD PRESSURE: 112 MMHG | DIASTOLIC BLOOD PRESSURE: 70 MMHG | HEART RATE: 66 BPM | BODY MASS INDEX: 35.71 KG/M2 | WEIGHT: 189 LBS

## 2024-02-26 DIAGNOSIS — I10 ESSENTIAL HYPERTENSION: ICD-10-CM

## 2024-02-26 DIAGNOSIS — E66.01 CLASS 2 SEVERE OBESITY DUE TO EXCESS CALORIES WITH SERIOUS COMORBIDITY AND BODY MASS INDEX (BMI) OF 35.0 TO 35.9 IN ADULT (H): ICD-10-CM

## 2024-02-26 DIAGNOSIS — I25.10 CORONARY ARTERY CALCIFICATION SEEN ON CAT SCAN: ICD-10-CM

## 2024-02-26 DIAGNOSIS — R79.89 ELEVATED TROPONIN: Primary | ICD-10-CM

## 2024-02-26 DIAGNOSIS — I25.9 CHEST PAIN DUE TO MYOCARDIAL ISCHEMIA, UNSPECIFIED ISCHEMIC CHEST PAIN TYPE: ICD-10-CM

## 2024-02-26 DIAGNOSIS — E78.5 DYSLIPIDEMIA, GOAL LDL BELOW 70: ICD-10-CM

## 2024-02-26 DIAGNOSIS — R60.0 BILATERAL LEG EDEMA: ICD-10-CM

## 2024-02-26 DIAGNOSIS — E66.812 CLASS 2 SEVERE OBESITY DUE TO EXCESS CALORIES WITH SERIOUS COMORBIDITY AND BODY MASS INDEX (BMI) OF 35.0 TO 35.9 IN ADULT (H): ICD-10-CM

## 2024-02-26 PROCEDURE — 99214 OFFICE O/P EST MOD 30 MIN: CPT | Performed by: INTERNAL MEDICINE

## 2024-02-26 PROCEDURE — G2211 COMPLEX E/M VISIT ADD ON: HCPCS | Performed by: INTERNAL MEDICINE

## 2024-02-26 RX ORDER — FUROSEMIDE 40 MG
40 TABLET ORAL PRN
Qty: 30 TABLET | Refills: 1 | Status: SHIPPED | OUTPATIENT
Start: 2024-02-26

## 2024-02-26 NOTE — PROGRESS NOTES
Assessment/Plan:   Calcified coronary atherosclerosis, chest tightness: The patient developed intermittent chest tightness over last several months, not typically associated with exertion, but exertion can trigger his chest tightness.  Her chest tightness can be relieved by rest.  Her ECG during hospitalization was normal.  Her hypersensitivity troponin was elevated to 46.  She does have a several risk factors for developing significant coronary artery disease.  Her chest CTA was reported mild calcification in coronary arteries.  However, her symptoms could be caused by noncalcified significant blockage.  We discussed the further evaluation and management.  After discussion, coronary CT angiogram with calcium score is requested for further evaluation.    Benign essential hypertension: Her blood pressure is controlled with hydrochlorothiazide 25 mg daily, losartan 25 mg daily.  Dyslipidemia: LDL was reasonably controlled with rosuvastatin 20 mg at bedtime.  Bilateral leg edema: The patient developed bilateral leg edema over last several months.  It could be caused by elevated right ventricular pressure secondary to acute pulmonary embolism.  Echo was reported no elevated left ventricular filling pressure.  We discussed a low-salt diet, prescribed Lasix 40 mg as needed for leg edema.  Obesity, acute pulmonary embolism: We discussed a low-salt diet, lifestyle modification.  The patient is on Eliquis for pulmonary embolism.    We will follow-up his coronary CT angiogram report, discuss the findings with the patient.    Thank you for the opportunity to be involved in the care of Dandy Mejia. If you have any questions, please feel free to contact me.  I will see the patient again in 12 months and as needed.    Much or all of the text in this note was generated through the use of Dragon Dictate voice-to-text software. Errors in spelling or words which seem out of context are unintentional. Sound alike errors,  in particular, may have escaped editing.       History of Present Illness:   It is my pleasure to see Dandy Mejia at the [facility] clinic for routine cardiology follow up post hospital discharge.  Dandy Mejia is a 69 year old female with a medical history of calcified coronary atherosclerosis on chest CTA, benign essential hypertension, dyslipidemia, obesity, obstructive sleep apnea, pulmonary embolism.    The patient was admitted to hospital on December 22, 2023 due to acute pulmonary embolism.  At that time, hyposensitivity troponin was elevated to 46 which was thought due to acute pulmonary embolism.  The patient was treated with anticoagulation.  Currently she is on Eliquis of 5 mg twice a day.    She presents to clinic today for routine cardiology follow-up post hospital discharge.  She complains of for intermittent chest tightness, discomfort.  She walked to clinical with the episode of chest tightness which was not relieved by rest.  She states that usually lasted for several minutes, on and off, no radiation, not typically associated with exertion.  She has no shortness of breath on exertion.  No palpitations, no dizziness, no orthopnea, PND.  She developed mild bilateral leg edema.  Her blood pressure and heart rate are controlled.  She has no side effects of current medications.    Past Medical History:     Patient Active Problem List   Diagnosis    Obstructive sleep apnea    Thrombophlebitis Of The Left Deep Femoral Vein    Post-gastric Bypass For Obesity    Nephrolithiasis    Adenomatous colon polyp (02/2014)    Essential hypertension    Dyspnea on exertion    Acute pulmonary embolism with acute cor pulmonale, unspecified pulmonary embolism type (H)    Class 2 severe obesity due to excess calories with serious comorbidity in adult (H)       Past Surgical History:     Past Surgical History:   Procedure Laterality Date    CHOLECYSTECTOMY      GASTRIC BYPASS  1980    HYSTERECTOMY  "TOTAL ABDOMINAL  1993    RELEASE CARPAL TUNNEL Right     TONSILLECTOMY      TUBAL LIGATION         Family History:     Family History   Problem Relation Age of Onset    Heart Failure Mother     Atrial fibrillation Mother     Macular Degeneration Mother     Diabetes Type 2  Father         Older-age onset of diabetes for multiple family members in father's side    Cancer Sister         \"Female cancer wih lymph node removal\"    Glaucoma Sister         Being watched for glaucoma    Depression Sister     Fibromyalgia Sister     Glaucoma Sister         Social History:    reports that she has never smoked. She has been exposed to tobacco smoke. She has never used smokeless tobacco. She reports current alcohol use of about 7.0 standard drinks of alcohol per week. She reports that she does not use drugs.    Review of Systems:   12 systems are reviewed negative except for in HPI.    Meds:     Current Outpatient Medications:     apixaban ANTICOAGULANT (ELIQUIS ANTICOAGULANT) 5 MG tablet, Take 1 tablet (5 mg) by mouth 2 times daily, Disp: 180 tablet, Rfl: 3    Calcium Carbonate-Vit D-Min (CALCIUM 1200 PO), Take 1 tablet by mouth 2 times daily, Disp: , Rfl:     cholecalciferol, vitamin D3, 5,000 unit Tab, [CHOLECALCIFEROL, VITAMIN D3, 5,000 UNIT TAB] Take 1 tablet by mouth daily. , Disp: , Rfl:     ferrous sulfate 325 (65 FE) MG tablet, [FERROUS SULFATE 325 (65 FE) MG TABLET] Take 325 mg by mouth., Disp: , Rfl:     fish oil-omega-3 fatty acids 1000 MG capsule, Take 2 g by mouth daily, Disp: , Rfl:     furosemide (LASIX) 40 MG tablet, Take 1 tablet (40 mg) by mouth as needed (leeg edema), Disp: 30 tablet, Rfl: 1    hydrochlorothiazide (HYDRODIURIL) 25 MG tablet, Take 1 tablet (25 mg) by mouth daily, Disp: 90 tablet, Rfl: 3    losartan (COZAAR) 25 MG tablet, Take 1 tablet (25 mg) by mouth daily, Disp: , Rfl:     Probiotic Product (DAILY DIGESTIVE PROBIOTIC PO), Take 1 capsule by mouth daily, Disp: , Rfl:     rosuvastatin (CRESTOR) " 20 MG tablet, Take 1 tablet (20 mg) by mouth daily Please call PCP for refills, Disp: 90 tablet, Rfl: 0    triamcinolone (KENALOG) 0.1 % cream, [TRIAMCINOLONE (KENALOG) 0.1 % CREAM] Apply to affected areas 3 times a day for 2 weeks (Patient taking differently: Apply topically 3 times daily as needed for irritation), Disp: 45 g, Rfl: 1    Current Facility-Administered Medications:     cyanocobalamin injection 1,000 mcg, 1,000 mcg, Intramuscular, Q30 Days, Suman Doshi MD, 1,000 mcg at 02/05/24 1101    cyanocobalamin injection 1,000 mcg, 1,000 mcg, Intramuscular, Q30 Days, Suman Doshi MD, 1,000 mcg at 12/04/23 1105    Allergies:   Other allergy (see comments) [external allergen needs reconciliation - see comment]      Objective:      Physical Exam  85.7 kg (189 lb)     Body mass index is 35.71 kg/m .  /70 (BP Location: Left arm, Patient Position: Sitting, Cuff Size: Adult Regular)   Pulse 66   Wt 85.7 kg (189 lb)   LMP  (LMP Unknown)   SpO2 97%   BMI 35.71 kg/m      General Appearance:   Awake, Alert, No acute distress.   HEENT:  Pupil equal and reactive to light. No scleral icterus; the mucous membranes were moist.   Neck: No cervical bruits. No JVD. No thyromegaly.     Chest: The spine was straight. The chest was symmetric.   Lungs:   Respirations unlabored; Lungs are clear to auscultation. No crackles. No wheezing.   Cardiovascular:   Regular rhythm and rate, normal first and second heart sounds with no murmurs. No rubs or gallops.    Abdomen:  Obese. Soft. No tenderness. Non-distended. Bowels sounds are present   Extremities: Equal tibial pulses. Mild bilateral leg edema.   Skin: No rashes or ulcers. Warm, Dry.   Musculoskeletal: No tenderness. No deformity.   Neurologic: Mood and affect are appropriate. No focal deficits.         EKG: Personally reviewed  Sinus rhythm   Normal ECG   No previous ECGs available     Cardiac Imaging Studies  ECHO on 12-:  Left ventricular size, wall motion  "and function are normal. The ejection  fraction is 60-65%.  Normal right ventricle size and systolic function.  Right ventricle systolic pressure estimate normal  No hemodynamically significant valvular abnormalities on 2D or color flow  imaging.    Lab Review   Lab Results   Component Value Date     01/29/2024    CO2 31 01/29/2024    CO2 27 10/21/2021    BUN 9.2 01/29/2024    BUN 8 10/21/2021     Lab Results   Component Value Date    WBC 6.3 01/29/2024    HGB 13.0 01/29/2024    HCT 39.3 01/29/2024    MCV 98 01/29/2024     01/29/2024     Lab Results   Component Value Date    CHOL 195 08/17/2023    CHOL 194 08/29/2022    CHOL 215 (H) 08/23/2021     Lab Results   Component Value Date    HDL 71 08/17/2023    HDL 68 08/29/2022    HDL 54 08/23/2021     No components found for: \"LDLCALC\"  Lab Results   Component Value Date    TRIG 113 08/17/2023    TRIG 111 08/29/2022    TRIG 161 (H) 08/23/2021     Lab Results   Component Value Date    TSH 1.70 12/22/2023             "

## 2024-02-26 NOTE — LETTER
2/26/2024    Suman Doshi MD  [address] [place] [address]    RE: Dandy Mejia       Dear Colleague,     I had the pleasure of seeing Dandy Mejia in the [facility].            Assessment/Plan:   Calcified coronary atherosclerosis, chest tightness: The patient developed intermittent chest tightness over last several months, not typically associated with exertion, but exertion can trigger his chest tightness.  Her chest tightness can be relieved by rest.  Her ECG during hospitalization was normal.  Her hypersensitivity troponin was elevated to 46.  She does have a several risk factors for developing significant coronary artery disease.  Her chest CTA was reported mild calcification in coronary arteries.  However, her symptoms could be caused by noncalcified significant blockage.  We discussed the further evaluation and management.  After discussion, coronary CT angiogram with calcium score is requested for further evaluation.    Benign essential hypertension: Her blood pressure is controlled with hydrochlorothiazide 25 mg daily, losartan 25 mg daily.  Dyslipidemia: LDL was reasonably controlled with rosuvastatin 20 mg at bedtime.  Bilateral leg edema: The patient developed bilateral leg edema over last several months.  It could be caused by elevated right ventricular pressure secondary to acute pulmonary embolism.  Echo was reported no elevated left ventricular filling pressure.  We discussed a low-salt diet, prescribed Lasix 40 mg as needed for leg edema.  Obesity, acute pulmonary embolism: We discussed a low-salt diet, lifestyle modification.  The patient is on Eliquis for pulmonary embolism.    We will follow-up his coronary CT angiogram report, discuss the findings with the patient.    Thank you for the opportunity to be involved in the care of Dandy Mejia. If you have any questions, please feel free to contact me.  I will see the patient again in 12 months and as  needed.    Much or all of the text in this note was generated through the use of Dragon Dictate voice-to-text software. Errors in spelling or words which seem out of context are unintentional. Sound alike errors, in particular, may have escaped editing.       History of Present Illness:   It is my pleasure to see Dandy Mejia at the [facility] clinic for routine cardiology follow up post hospital discharge.  Dandy Mejia is a 69 year old female with a medical history of calcified coronary atherosclerosis on chest CTA, benign essential hypertension, dyslipidemia, obesity, obstructive sleep apnea, pulmonary embolism.    The patient was admitted to hospital on December 22, 2023 due to acute pulmonary embolism.  At that time, hyposensitivity troponin was elevated to 46 which was thought due to acute pulmonary embolism.  The patient was treated with anticoagulation.  Currently she is on Eliquis of 5 mg twice a day.    She presents to clinic today for routine cardiology follow-up post hospital discharge.  She complains of for intermittent chest tightness, discomfort.  She walked to clinical with the episode of chest tightness which was not relieved by rest.  She states that usually lasted for several minutes, on and off, no radiation, not typically associated with exertion.  She has no shortness of breath on exertion.  No palpitations, no dizziness, no orthopnea, PND.  She developed mild bilateral leg edema.  Her blood pressure and heart rate are controlled.  She has no side effects of current medications.    Past Medical History:     Patient Active Problem List   Diagnosis    Obstructive sleep apnea    Thrombophlebitis Of The Left Deep Femoral Vein    Post-gastric Bypass For Obesity    Nephrolithiasis    Adenomatous colon polyp (02/2014)    Essential hypertension    Dyspnea on exertion    Acute pulmonary embolism with acute cor pulmonale, unspecified pulmonary embolism type (H)    Class 2 severe  "obesity due to excess calories with serious comorbidity in adult (H)       Past Surgical History:     Past Surgical History:   Procedure Laterality Date    CHOLECYSTECTOMY      GASTRIC BYPASS  1980    HYSTERECTOMY TOTAL ABDOMINAL  1993    RELEASE CARPAL TUNNEL Right     TONSILLECTOMY      TUBAL LIGATION         Family History:     Family History   Problem Relation Age of Onset    Heart Failure Mother     Atrial fibrillation Mother     Macular Degeneration Mother     Diabetes Type 2  Father         Older-age onset of diabetes for multiple family members in father's side    Cancer Sister         \"Female cancer wih lymph node removal\"    Glaucoma Sister         Being watched for glaucoma    Depression Sister     Fibromyalgia Sister     Glaucoma Sister         Social History:    reports that she has never smoked. She has been exposed to tobacco smoke. She has never used smokeless tobacco. She reports current alcohol use of about 7.0 standard drinks of alcohol per week. She reports that she does not use drugs.    Review of Systems:   12 systems are reviewed negative except for in HPI.    Meds:     Current Outpatient Medications:     apixaban ANTICOAGULANT (ELIQUIS ANTICOAGULANT) 5 MG tablet, Take 1 tablet (5 mg) by mouth 2 times daily, Disp: 180 tablet, Rfl: 3    Calcium Carbonate-Vit D-Min (CALCIUM 1200 PO), Take 1 tablet by mouth 2 times daily, Disp: , Rfl:     cholecalciferol, vitamin D3, 5,000 unit Tab, [CHOLECALCIFEROL, VITAMIN D3, 5,000 UNIT TAB] Take 1 tablet by mouth daily. , Disp: , Rfl:     ferrous sulfate 325 (65 FE) MG tablet, [FERROUS SULFATE 325 (65 FE) MG TABLET] Take 325 mg by mouth., Disp: , Rfl:     fish oil-omega-3 fatty acids 1000 MG capsule, Take 2 g by mouth daily, Disp: , Rfl:     furosemide (LASIX) 40 MG tablet, Take 1 tablet (40 mg) by mouth as needed (leeg edema), Disp: 30 tablet, Rfl: 1    hydrochlorothiazide (HYDRODIURIL) 25 MG tablet, Take 1 tablet (25 mg) by mouth daily, Disp: 90 tablet, " Rfl: 3    losartan (COZAAR) 25 MG tablet, Take 1 tablet (25 mg) by mouth daily, Disp: , Rfl:     Probiotic Product (DAILY DIGESTIVE PROBIOTIC PO), Take 1 capsule by mouth daily, Disp: , Rfl:     rosuvastatin (CRESTOR) 20 MG tablet, Take 1 tablet (20 mg) by mouth daily Please call PCP for refills, Disp: 90 tablet, Rfl: 0    triamcinolone (KENALOG) 0.1 % cream, [TRIAMCINOLONE (KENALOG) 0.1 % CREAM] Apply to affected areas 3 times a day for 2 weeks (Patient taking differently: Apply topically 3 times daily as needed for irritation), Disp: 45 g, Rfl: 1    Current Facility-Administered Medications:     cyanocobalamin injection 1,000 mcg, 1,000 mcg, Intramuscular, Q30 Days, Suman Doshi MD, 1,000 mcg at 02/05/24 1101    cyanocobalamin injection 1,000 mcg, 1,000 mcg, Intramuscular, Q30 Days, Suman Doshi MD, 1,000 mcg at 12/04/23 1105    Allergies:   Other allergy (see comments) [external allergen needs reconciliation - see comment]      Objective:      Physical Exam  85.7 kg (189 lb)     Body mass index is 35.71 kg/m .  /70 (BP Location: Left arm, Patient Position: Sitting, Cuff Size: Adult Regular)   Pulse 66   Wt 85.7 kg (189 lb)   LMP  (LMP Unknown)   SpO2 97%   BMI 35.71 kg/m      General Appearance:   Awake, Alert, No acute distress.   HEENT:  Pupil equal and reactive to light. No scleral icterus; the mucous membranes were moist.   Neck: No cervical bruits. No JVD. No thyromegaly.     Chest: The spine was straight. The chest was symmetric.   Lungs:   Respirations unlabored; Lungs are clear to auscultation. No crackles. No wheezing.   Cardiovascular:   Regular rhythm and rate, normal first and second heart sounds with no murmurs. No rubs or gallops.    Abdomen:  Obese. Soft. No tenderness. Non-distended. Bowels sounds are present   Extremities: Equal tibial pulses. Mild bilateral leg edema.   Skin: No rashes or ulcers. Warm, Dry.   Musculoskeletal: No tenderness. No deformity.   Neurologic: Mood and  "affect are appropriate. No focal deficits.         EKG: Personally reviewed  Sinus rhythm   Normal ECG   No previous ECGs available     Cardiac Imaging Studies  ECHO on 12-:  Left ventricular size, wall motion and function are normal. The ejection  fraction is 60-65%.  Normal right ventricle size and systolic function.  Right ventricle systolic pressure estimate normal  No hemodynamically significant valvular abnormalities on 2D or color flow  imaging.    Lab Review   Lab Results   Component Value Date     01/29/2024    CO2 31 01/29/2024    CO2 27 10/21/2021    BUN 9.2 01/29/2024    BUN 8 10/21/2021     Lab Results   Component Value Date    WBC 6.3 01/29/2024    HGB 13.0 01/29/2024    HCT 39.3 01/29/2024    MCV 98 01/29/2024     01/29/2024     Lab Results   Component Value Date    CHOL 195 08/17/2023    CHOL 194 08/29/2022    CHOL 215 (H) 08/23/2021     Lab Results   Component Value Date    HDL 71 08/17/2023    HDL 68 08/29/2022    HDL 54 08/23/2021     No components found for: \"LDLCALC\"  Lab Results   Component Value Date    TRIG 113 08/17/2023    TRIG 111 08/29/2022    TRIG 161 (H) 08/23/2021     Lab Results   Component Value Date    TSH 1.70 12/22/2023                 Thank you for allowing me to participate in the care of your patient.      Sincerely,     Karey Kemp MD     [facility]  cc:   Suman Doshi MD  [address] [place] [address] [place] [address]      "

## 2024-03-05 ENCOUNTER — ALLIED HEALTH/NURSE VISIT (OUTPATIENT)
Dept: FAMILY MEDICINE | Facility: CLINIC | Age: 69
End: 2024-03-05
Payer: COMMERCIAL

## 2024-03-05 DIAGNOSIS — E53.8 VITAMIN B12 DEFICIENCY (NON ANEMIC): Primary | ICD-10-CM

## 2024-03-05 PROCEDURE — 99207 PR NO CHARGE NURSE ONLY: CPT

## 2024-03-05 PROCEDURE — 96372 THER/PROPH/DIAG INJ SC/IM: CPT | Performed by: INTERNAL MEDICINE

## 2024-03-05 RX ADMIN — CYANOCOBALAMIN 1000 MCG: 1000 INJECTION, SOLUTION INTRAMUSCULAR; SUBCUTANEOUS at 11:03

## 2024-03-06 ENCOUNTER — TRANSFERRED RECORDS (OUTPATIENT)
Dept: HEALTH INFORMATION MANAGEMENT | Facility: CLINIC | Age: 69
End: 2024-03-06
Payer: COMMERCIAL

## 2024-03-21 ENCOUNTER — HOSPITAL ENCOUNTER (OUTPATIENT)
Dept: CT IMAGING | Facility: CLINIC | Age: 69
Discharge: HOME | End: 2024-03-21
Attending: INTERNAL MEDICINE | Admitting: INTERNAL MEDICINE
Payer: COMMERCIAL

## 2024-03-21 VITALS — DIASTOLIC BLOOD PRESSURE: 58 MMHG | SYSTOLIC BLOOD PRESSURE: 111 MMHG

## 2024-03-21 DIAGNOSIS — I25.9 CHEST PAIN DUE TO MYOCARDIAL ISCHEMIA, UNSPECIFIED ISCHEMIC CHEST PAIN TYPE: ICD-10-CM

## 2024-03-21 DIAGNOSIS — I25.10 CORONARY ARTERY CALCIFICATION SEEN ON CAT SCAN: ICD-10-CM

## 2024-03-21 DIAGNOSIS — R79.89 ELEVATED TROPONIN: ICD-10-CM

## 2024-03-21 LAB
BSA FOR ECHO PROCEDURE: 1.84 M2
CCTA ASCENDING AORTA: 3.3
CCTA SINUS: 3.4
CREAT BLD-MCNC: 0.7 MG/DL (ref 0.6–1.1)
CV CALCIUM SCORE AGATSTON LM: 0
CV CALCIUM SCORING AGATSON LAD: 542
CV CALCIUM SCORING AGATSTON CX: 0
CV CALCIUM SCORING AGATSTON RCA: 42
CV CALCIUM SCORING AGATSTON TOTAL: 584
EGFRCR SERPLBLD CKD-EPI 2021: >60 ML/MIN/1.73M2

## 2024-03-21 PROCEDURE — 250N000011 HC RX IP 250 OP 636: Performed by: INTERNAL MEDICINE

## 2024-03-21 PROCEDURE — 75574 CT ANGIO HRT W/3D IMAGE: CPT

## 2024-03-21 PROCEDURE — 82565 ASSAY OF CREATININE: CPT

## 2024-03-21 PROCEDURE — 250N000013 HC RX MED GY IP 250 OP 250 PS 637: Performed by: INTERNAL MEDICINE

## 2024-03-21 PROCEDURE — 75574 CT ANGIO HRT W/3D IMAGE: CPT | Mod: 26 | Performed by: GENERAL ACUTE CARE HOSPITAL

## 2024-03-21 RX ORDER — METOPROLOL TARTRATE 1 MG/ML
5 INJECTION, SOLUTION INTRAVENOUS
Status: DISCONTINUED | OUTPATIENT
Start: 2024-03-21 | End: 2024-03-22 | Stop reason: HOSPADM

## 2024-03-21 RX ORDER — NITROGLYCERIN 0.4 MG/1
0.4 TABLET SUBLINGUAL ONCE
Status: COMPLETED | OUTPATIENT
Start: 2024-03-21 | End: 2024-03-21

## 2024-03-21 RX ORDER — DILTIAZEM HYDROCHLORIDE 5 MG/ML
5 INJECTION INTRAVENOUS
Status: DISCONTINUED | OUTPATIENT
Start: 2024-03-21 | End: 2024-03-22 | Stop reason: HOSPADM

## 2024-03-21 RX ORDER — IOPAMIDOL 755 MG/ML
100 INJECTION, SOLUTION INTRAVASCULAR ONCE
Status: COMPLETED | OUTPATIENT
Start: 2024-03-21 | End: 2024-03-21

## 2024-03-21 RX ORDER — DILTIAZEM HYDROCHLORIDE 5 MG/ML
10 INJECTION INTRAVENOUS
Status: DISCONTINUED | OUTPATIENT
Start: 2024-03-21 | End: 2024-03-22 | Stop reason: HOSPADM

## 2024-03-21 RX ADMIN — IOPAMIDOL 100 ML: 755 INJECTION, SOLUTION INTRAVENOUS at 10:03

## 2024-03-21 RX ADMIN — NITROGLYCERIN 0.4 MG: 0.4 TABLET SUBLINGUAL at 10:03

## 2024-04-03 ENCOUNTER — TRANSFERRED RECORDS (OUTPATIENT)
Dept: HEALTH INFORMATION MANAGEMENT | Facility: CLINIC | Age: 69
End: 2024-04-03
Payer: COMMERCIAL

## 2024-04-05 ENCOUNTER — OFFICE VISIT (OUTPATIENT)
Dept: INTERNAL MEDICINE | Facility: CLINIC | Age: 69
End: 2024-04-05
Payer: COMMERCIAL

## 2024-04-05 VITALS
OXYGEN SATURATION: 98 % | TEMPERATURE: 98.3 F | HEIGHT: 61 IN | BODY MASS INDEX: 36.06 KG/M2 | HEART RATE: 60 BPM | WEIGHT: 191 LBS | RESPIRATION RATE: 18 BRPM | SYSTOLIC BLOOD PRESSURE: 130 MMHG | DIASTOLIC BLOOD PRESSURE: 76 MMHG

## 2024-04-05 DIAGNOSIS — I10 ESSENTIAL HYPERTENSION: Primary | ICD-10-CM

## 2024-04-05 DIAGNOSIS — I25.84 CORONARY ARTERY DISEASE DUE TO CALCIFIED CORONARY LESION: ICD-10-CM

## 2024-04-05 DIAGNOSIS — Z51.81 ENCOUNTER FOR THERAPEUTIC DRUG MONITORING: ICD-10-CM

## 2024-04-05 DIAGNOSIS — Z98.84 BARIATRIC SURGERY STATUS: ICD-10-CM

## 2024-04-05 DIAGNOSIS — E78.00 HYPERCHOLESTEROLEMIA: ICD-10-CM

## 2024-04-05 DIAGNOSIS — M17.12 PRIMARY OSTEOARTHRITIS OF LEFT KNEE: ICD-10-CM

## 2024-04-05 DIAGNOSIS — I25.10 CORONARY ARTERY DISEASE DUE TO CALCIFIED CORONARY LESION: ICD-10-CM

## 2024-04-05 DIAGNOSIS — E53.8 VITAMIN B12 DEFICIENCY (NON ANEMIC): ICD-10-CM

## 2024-04-05 DIAGNOSIS — Z86.718 PERSONAL HISTORY OF DVT (DEEP VEIN THROMBOSIS): ICD-10-CM

## 2024-04-05 DIAGNOSIS — G47.33 OBSTRUCTIVE SLEEP APNEA ON CPAP: ICD-10-CM

## 2024-04-05 LAB
ALBUMIN SERPL BCG-MCNC: 4 G/DL (ref 3.5–5.2)
ALP SERPL-CCNC: 46 U/L (ref 40–150)
ALT SERPL W P-5'-P-CCNC: 28 U/L (ref 0–50)
ANION GAP SERPL CALCULATED.3IONS-SCNC: 9 MMOL/L (ref 7–15)
AST SERPL W P-5'-P-CCNC: 26 U/L (ref 0–45)
BILIRUB SERPL-MCNC: 0.5 MG/DL
BUN SERPL-MCNC: 9.1 MG/DL (ref 8–23)
CALCIUM SERPL-MCNC: 9.1 MG/DL (ref 8.8–10.2)
CHLORIDE SERPL-SCNC: 96 MMOL/L (ref 98–107)
CHOLEST SERPL-MCNC: 113 MG/DL
CK SERPL-CCNC: 123 U/L (ref 26–192)
CREAT SERPL-MCNC: 0.7 MG/DL (ref 0.51–0.95)
DEPRECATED HCO3 PLAS-SCNC: 29 MMOL/L (ref 22–29)
EGFRCR SERPLBLD CKD-EPI 2021: >90 ML/MIN/1.73M2
FASTING STATUS PATIENT QL REPORTED: YES
GLUCOSE SERPL-MCNC: 114 MG/DL (ref 70–99)
HDLC SERPL-MCNC: 61 MG/DL
LDLC SERPL CALC-MCNC: 29 MG/DL
NONHDLC SERPL-MCNC: 52 MG/DL
POTASSIUM SERPL-SCNC: 3.5 MMOL/L (ref 3.4–5.3)
PROT SERPL-MCNC: 5.8 G/DL (ref 6.4–8.3)
SODIUM SERPL-SCNC: 134 MMOL/L (ref 135–145)
TRIGL SERPL-MCNC: 114 MG/DL

## 2024-04-05 PROCEDURE — 82550 ASSAY OF CK (CPK): CPT | Performed by: INTERNAL MEDICINE

## 2024-04-05 PROCEDURE — 36415 COLL VENOUS BLD VENIPUNCTURE: CPT | Performed by: INTERNAL MEDICINE

## 2024-04-05 PROCEDURE — 99214 OFFICE O/P EST MOD 30 MIN: CPT | Mod: 25 | Performed by: INTERNAL MEDICINE

## 2024-04-05 PROCEDURE — 96372 THER/PROPH/DIAG INJ SC/IM: CPT | Performed by: INTERNAL MEDICINE

## 2024-04-05 PROCEDURE — 80053 COMPREHEN METABOLIC PANEL: CPT | Performed by: INTERNAL MEDICINE

## 2024-04-05 PROCEDURE — 80061 LIPID PANEL: CPT | Performed by: INTERNAL MEDICINE

## 2024-04-05 RX ORDER — LOSARTAN POTASSIUM 25 MG/1
25 TABLET ORAL 2 TIMES DAILY
Qty: 180 TABLET | Refills: 3 | Status: SHIPPED | OUTPATIENT
Start: 2024-04-05 | End: 2024-06-03

## 2024-04-05 RX ORDER — CYANOCOBALAMIN 1000 UG/ML
1000 INJECTION, SOLUTION INTRAMUSCULAR; SUBCUTANEOUS
Status: ACTIVE | OUTPATIENT
Start: 2024-04-05 | End: 2025-03-31

## 2024-04-05 RX ADMIN — CYANOCOBALAMIN 1000 MCG: 1000 INJECTION, SOLUTION INTRAMUSCULAR; SUBCUTANEOUS at 10:35

## 2024-04-05 NOTE — PROGRESS NOTES
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  She had had a previous DVT over 4 years ago and had an old leg injury from a right accident as a teenager.  Her mother had a pulmonary embolism and her grandmother had a pulmonary embolism.    Patient did see hematology with recommendation for lifelong anticoagulation.  Xarelto level was of adequate with concerns over absorption and she was changed to Eliquis.    Hematology did give her the okay to go ahead with a knee replacement after 6 months of anticoagulation.  She does have moderately severe right knee DJD and does wish to have the knee replacement.    She has obstructive sleep apnea but highly compliant with CPAP.  No palpitations or history of atrial fibrillation.  He does have some intermittent lower extremity edema related to this.    Cardiology gave her furosemide which she has used 3 times.  No significant edema at this time.    Her blood pressure was running high on the once a day losartan and she went back to losartan twice a day 25 mg, previous dose.  Still on HCTZ 25 mg a day.    Blood pressure has been well-controlled now without orthostasis.    No chest pain or chest pressure.  Her chest CT in December showed some mild coronary calcification.  She has a family history of coronary disease.    She has been on rosuvastatin 20 mg a day since December.  No generalized myalgias or history of liver problems.    February 2024 CT angiogram showed nonobstructive coronary disease with a calcium score of 584.    History of gastric bypass with monthly B12 shots.    Is never smoked.  No new cough or shortness of breath.    Status post hysterectomy.    2016 DEXA scan with a spine score of -1.0 and femur score  -1.4/-1.3    June 2022 colonoscopy with polyp, 5-year follow-up plan.    She did have a melanoma removed from her right arm, it has been resected.  She plans a 6-month follow-up with dermatology.    PMHx:    Past Medical History:   Diagnosis Date    Benign essential hypertension     DVT (deep venous thrombosis) (H)     Dyslipidemia     Gastric bypass status for obesity     Pulmonary embolism (H)      PSHx:    Past Surgical History:   Procedure Laterality Date    CHOLECYSTECTOMY      GASTRIC BYPASS  1980    HYSTERECTOMY TOTAL ABDOMINAL  1993    RELEASE CARPAL TUNNEL Right     TONSILLECTOMY      TUBAL LIGATION       Immunizations:   Immunization History   Administered Date(s) Administered    COVID-19 12+ (2023-24) (MODERNA) 11/18/2023    COVID-19 Bivalent 18+ (Moderna) 10/12/2022    COVID-19 Monovalent 18+ (Moderna) 03/08/2021, 04/05/2021, 11/12/2021, 08/01/2022    DT (PEDS <7y) 02/05/2004    Flu, Unspecified 12/10/2007, 11/11/2008, 01/13/2010, 10/22/2010, 10/27/2011, 11/14/2012, 10/23/2014, 10/27/2015, 03/15/2018    Influenza (H1N1) 01/13/2010    Influenza (High Dose) 3 valent vaccine 10/29/2018, 09/21/2020    Influenza (IIV3) PF 10/25/2005, 11/17/2006, 12/10/2007, 11/11/2008, 10/23/2014    Influenza Vaccine 65+ (Fluzone HD) 09/21/2020, 10/21/2021, 10/31/2022, 11/02/2023    Influenza Vaccine >6 months,quad, PF 10/25/2019    Influenza Vaccine, 6+MO IM (QUADRIVALENT W/PRESERVATIVES) 10/23/2014, 10/27/2015, 10/25/2016, 10/16/2017    Influenza, seasonal, injectable, PF 10/22/2010, 10/27/2011, 11/14/2012    Pneumo Conj 13-V (2010&after) 03/15/2018    Pneumococcal 23 valent 03/28/2019    RSV Vaccine (Abrysvo) 12/12/2023    TDAP (Adacel,Boostrix) 01/08/2013    Td (Adult), Adsorbed 02/05/2004, 01/08/2013    Td,adult,historic,unspecified 02/05/2004, 01/08/2013    Zoster recombinant adjuvanted (SHINGRIX) 02/16/2024       ROS A comprehensive review of systems was performed and was otherwise negative    Medications, allergies,  "and problem list were reviewed and updated    Exam  /76 (BP Location: Right arm, Patient Position: Sitting, Cuff Size: Adult Regular)   Pulse 60   Temp 98.3  F (36.8  C)   Resp 18   Ht 1.549 m (5' 1\")   Wt 86.6 kg (191 lb)   LMP  (LMP Unknown)   SpO2 98%   BMI 36.09 kg/m    Appears well.  Mobility is good.  Lungs are clear.  Heart is regular without murmur.  Just trace ankle edema, improved    Assessment/Plan  1. Essential hypertension  Controlled.  Continue twice a day losartan 25 mg a day.  Continue HCTZ 25 mg a day  - losartan (COZAAR) 25 MG tablet; Take 1 tablet (25 mg) by mouth 2 times daily  Dispense: 180 tablet; Refill: 3  She does have some intermittent lower extremity edema related to her sleep apnea, but currently controlled.  She was warned that the furosemide is also a diuretic in addition to the HCTZ and that may affect her potassium, see patient instructions.      2. Obstructive sleep apnea on CPAP  Compliant with CPAP    3. Personal history of DVT (deep vein thrombosis)  Lifelong Eliquis with likely family history of hypercoagulable state.  Hematology has given her the okay to go ahead with a knee replacement after 6 months of anticoagulation.  She sees hematology before the knee replacement to discuss the anticoagulation further.    4. Coronary artery disease due to calcified coronary lesion  Asymptomatic, nonobstructing disease but continue on the rosuvastatin 20 mg a day.  Goal LDL less than 80    5. Primary osteoarthritis of left knee  Patient does wish to proceed with the right knee replacement as soon as possible.  Is currently scheduled for June 20    6. Hypercholesterolemia  Continue rosuvastatin 20 mg.  Goal LDL less than 80  - Lipid Profile    7. Vitamin B12 deficiency (non anemic)  Given today, monthly B12 shots  - cyanocobalamin injection 1,000 mcg    8. Post-gastric Bypass For Obesity  As above    9. Encounter for therapeutic drug monitoring    - CK total  - Comprehensive " metabolic panel    History of melanoma resected from right arm.  Follow-up with dermatology, she will need 6-month follow-up as planned.    The longitudinal plan of care for the diagnosis(es)/condition(s) as documented were addressed during this visit. Due to the added complexity in care, I will continue to support Dandy in the subsequent management and with ongoing continuity of care.        Return in 8 weeks (on 6/3/2024) for Preop for knee replacement, you do not need to fast for this appointment.   Patient Instructions   Continue your current medication.    If you do take furosemide more than once a week, contact us to have your potassium checked.    Continue to walk and stay active on a daily basis to maintain your strength and mobility prior to your knee replacement in June.    Wear your CPAP machine all night every night.    See me on [facility] 3 for your preop, you do not need to fast for that appointment.    Suman Doshi MD, MD        Current Outpatient Medications   Medication Sig Dispense Refill    apixaban ANTICOAGULANT (ELIQUIS ANTICOAGULANT) 5 MG tablet Take 1 tablet (5 mg) by mouth 2 times daily 180 tablet 3    Calcium Carbonate-Vit D-Min (CALCIUM 1200 PO) Take 1 tablet by mouth 2 times daily      cholecalciferol, vitamin D3, 5,000 unit Tab [CHOLECALCIFEROL, VITAMIN D3, 5,000 UNIT TAB] Take 1 tablet by mouth daily.       ferrous sulfate 325 (65 FE) MG tablet [FERROUS SULFATE 325 (65 FE) MG TABLET] Take 325 mg by mouth.      fish oil-omega-3 fatty acids 1000 MG capsule Take 2 g by mouth daily      furosemide (LASIX) 40 MG tablet Take 1 tablet (40 mg) by mouth as needed (leeg edema) 30 tablet 1    hydrochlorothiazide (HYDRODIURIL) 25 MG tablet Take 1 tablet (25 mg) by mouth daily 90 tablet 3    losartan (COZAAR) 25 MG tablet Take 1 tablet (25 mg) by mouth 2 times daily 180 tablet 3    Probiotic Product (DAILY DIGESTIVE PROBIOTIC PO) Take 1 capsule by mouth daily      rosuvastatin (CRESTOR) 20 MG tablet  Take 1 tablet (20 mg) by mouth daily Please call PCP for refills 90 tablet 0    triamcinolone (KENALOG) 0.1 % cream [TRIAMCINOLONE (KENALOG) 0.1 % CREAM] Apply to affected areas 3 times a day for 2 weeks (Patient taking differently: Apply topically 3 times daily as needed for irritation) 45 g 1     No Known Allergies  Social History     Tobacco Use    Smoking status: Never     Passive exposure: Past    Smokeless tobacco: Never   Vaping Use    Vaping Use: Never used   Substance Use Topics    Alcohol use: Yes     Alcohol/week: 7.0 standard drinks of alcohol     Types: 7 Standard drinks or equivalent per week    Drug use: No             Subjective   Dandy is a 69 year old, presenting for the following health issues:  Follow Up (3 month check / Needs B12 / fasting)      4/5/2024    10:06 AM   Additional Questions   Roomed by Lara RAINEY     History of Present Illness       Hypertension: She presents for follow up of hypertension.  She does not check blood pressure  regularly outside of the clinic. Outpatient blood pressures have not been over 140/90. She follows a low salt diet.     She eats 2-3 servings of fruits and vegetables daily.She consumes 0 sweetened beverage(s) daily.She exercises with enough effort to increase her heart rate 9 or less minutes per day.  She exercises with enough effort to increase her heart rate 3 or less days per week.   She is taking medications regularly.                     Objective    LMP  (LMP Unknown)   There is no height or weight on file to calculate BMI.  Physical Exam               Signed Electronically by: Suman Doshi MD

## 2024-04-05 NOTE — PATIENT INSTRUCTIONS
Continue your current medication.    If you do take furosemide more than once a week, contact us to have your potassium checked.    Continue to walk and stay active on a daily basis to maintain your strength and mobility prior to your knee replacement in June.    Wear your CPAP machine all night every night.    See me on Su[facility] 3 for your preop, you do not need to fast for that appointment.

## 2024-04-22 ENCOUNTER — MEDICAL CORRESPONDENCE (OUTPATIENT)
Dept: HEALTH INFORMATION MANAGEMENT | Facility: CLINIC | Age: 69
End: 2024-04-22

## 2024-04-22 DIAGNOSIS — E78.5 HYPERLIPIDEMIA LDL GOAL <100: ICD-10-CM

## 2024-04-22 DIAGNOSIS — I10 ESSENTIAL HYPERTENSION: ICD-10-CM

## 2024-04-22 RX ORDER — ROSUVASTATIN CALCIUM 20 MG/1
20 TABLET, COATED ORAL DAILY
Qty: 90 TABLET | Refills: 2 | Status: SHIPPED | OUTPATIENT
Start: 2024-04-22

## 2024-04-22 RX ORDER — LOSARTAN POTASSIUM 25 MG/1
25 TABLET ORAL 2 TIMES DAILY
Qty: 180 TABLET | Refills: 3 | Status: CANCELLED | OUTPATIENT
Start: 2024-04-22

## 2024-05-03 ENCOUNTER — ALLIED HEALTH/NURSE VISIT (OUTPATIENT)
Dept: FAMILY MEDICINE | Facility: CLINIC | Age: 69
End: 2024-05-03
Payer: COMMERCIAL

## 2024-05-03 DIAGNOSIS — E53.8 VITAMIN B12 DEFICIENCY (NON ANEMIC): Primary | ICD-10-CM

## 2024-05-03 PROCEDURE — 96372 THER/PROPH/DIAG INJ SC/IM: CPT | Performed by: INTERNAL MEDICINE

## 2024-05-03 PROCEDURE — 99207 PR NO CHARGE NURSE ONLY: CPT

## 2024-05-03 RX ADMIN — CYANOCOBALAMIN 1000 MCG: 1000 INJECTION, SOLUTION INTRAMUSCULAR; SUBCUTANEOUS at 11:12

## 2024-05-03 NOTE — PROGRESS NOTES
Clinic Administered Medication Documentation      Injectable Medication Documentation    Is there an active order (written within the past 365 days, with administrations remaining, not ) in the chart? Yes.     Patient was given Cyanocobalamin (B-12). Prior to medication administration, verified patient's identity using patient s name and date of birth. Please see MAR and medication order for additional information. Patient instructed to remain in clinic for 15 minutes and report any adverse reaction to staff immediately.    Vial/Syringe: Single dose vial. Was entire vial of medication used? Yes  Was this medication supplied by the patient? No  Is this a medication the patient will need to receive again? Yes. Verified that the patient has refills remaining in their prescription.     Ron NAZARIO RN

## 2024-05-16 NOTE — PROGRESS NOTES
[facility]  [address] [place] [address] [address] [address]  Main: [phone], Fax: [phone]      Office Visit Note:    Patient: Dandy Mejia  MRN: [id number]  : 1955  TC: May 22, 2024      Reason of today's visit:  Follow up, history of venous thromboembolism     Clinical History Summary:  Dandy Mejia is a 69 year old female with past medical history significant for obesity s/p gastric bypass, iron deficiency anemia, B12 deficiency, PENNIE, superficial thrombophlebitis and remote history of unprovoked distal lower extremity DVT who was evaluated in the ED  2023 for evaluation of dyspnea and fatigue and she was found to have bilateral pulmonary emboli. Her initial DVT episode many years ago was felt to be unprovoked. Her most recent episode was likely at least partially provoked by acute illness and associated bedrest as well as metabolic derangement and dehydration. She does have a notable family history of venous thromboembolism. She does have additional ongoing risk factors of obesity, advancing age, and varicosities. Given recurrent events with at least one likely unprovoked event, she was recommended to consider long term anticoagulation.      At her last visit, I did  her that Eliquis is the preferred DOAC in patients who have had gastric alteration from weight loss surgeries. A level was obtained and low thus she was changed to Eliquis from Xarelto.     She will completed 6 months of therapy in 2024.    She is scheduled for right TKA on 2024. Last dose of Eliquis to be taken  in the evening. Nadia Ayala at [facility]     Interim History:  Today, Dandy notes that she is doing quite well. She denies any chest pain, dyspnea, lower extremity pain . She does have ongoing lower extremity edema that is similar in nature to her chronic edema. She has not been wearing compression garments recently with the humidity.  She is tolerating Eliquis well without any adverse side effects. She notes that she would like to obtain a level to ensure it is therapeutic. She took her dose at 7:50AM. She notes that she is scheduled for her knee surgery as above. She denies any questions about her anticoagulation hold.      ROS:  Denies any bleeding complications. Specifically, no frequent epistaxis. No issues with oral mucosal bleeding. Denies any hematuria or blood in stools. Denies any shortness of breath. No chest pain. No cough. No fever.      Medications:   Current Outpatient Medications   Medication Sig Dispense Refill    apixaban ANTICOAGULANT (ELIQUIS ANTICOAGULANT) 5 MG tablet Take 1 tablet (5 mg) by mouth 2 times daily 180 tablet 3    Calcium Carbonate-Vit D-Min (CALCIUM 1200 PO) Take 1 tablet by mouth 2 times daily      cholecalciferol, vitamin D3, 5,000 unit Tab [CHOLECALCIFEROL, VITAMIN D3, 5,000 UNIT TAB] Take 1 tablet by mouth daily.       ferrous sulfate 325 (65 FE) MG tablet [FERROUS SULFATE 325 (65 FE) MG TABLET] Take 325 mg by mouth.      fish oil-omega-3 fatty acids 1000 MG capsule Take 2 g by mouth daily      furosemide (LASIX) 40 MG tablet Take 1 tablet (40 mg) by mouth as needed (leeg edema) 30 tablet 1    hydrochlorothiazide (HYDRODIURIL) 25 MG tablet Take 1 tablet (25 mg) by mouth daily 90 tablet 3    losartan (COZAAR) 25 MG tablet Take 1 tablet (25 mg) by mouth 2 times daily 180 tablet 3    Probiotic Product (DAILY DIGESTIVE PROBIOTIC PO) Take 1 capsule by mouth daily      rosuvastatin (CRESTOR) 20 MG tablet Take 1 tablet (20 mg) by mouth daily Please call PCP for refills 90 tablet 2    triamcinolone (KENALOG) 0.1 % cream [TRIAMCINOLONE (KENALOG) 0.1 % CREAM] Apply to affected areas 3 times a day for 2 weeks (Patient taking differently: Apply topically 3 times daily as needed for irritation) 45 g 1        Allergies:    No Known Allergies    PMH:   Past Medical History:   Diagnosis Date    Benign essential hypertension      DVT (deep venous thrombosis) (H)     Dyslipidemia     Gastric bypass status for obesity     Pulmonary embolism (H)        Social History:   Social History     Tobacco Use    Smoking status: Never     Passive exposure: Past    Smokeless tobacco: Never   Vaping Use    Vaping status: Never Used   Substance Use Topics    Alcohol use: Yes     Alcohol/week: 7.0 standard drinks of alcohol     Types: 7 Standard drinks or equivalent per week    Drug use: No       Family History:  Deferred    Objective:  Vitals: B/P: 132/84, T: 97.4, P: 66, R: Data Unavailable, Wt: 191 lbs 3.2 oz  Exam:   Constitutional: Appears well, in no acute distress  HEENT: Pupils equal  and round. Atraumatic.  CV: regular rate and rhythm, no murmur, rub or gallop appreciated  Respiratory: normal work of breathing, CTAB  P Vasc: trace bilateral lower extremity pretibial edema  Skin: no petechiae, purpura or ecchymosis.  Neuro:  AOx3        Labs:  CBC RESULTS:   Recent Labs   Lab Test 01/29/24  1302   WBC 6.3   RBC 4.01   HGB 13.0   HCT 39.3   MCV 98   MCH 32.4   MCHC 33.1   RDW 12.1        Last Comprehensive Metabolic Panel:  Sodium   Date Value Ref Range Status   04/05/2024 134 (L) 135 - 145 mmol/L Final     Comment:     Reference intervals for this test were updated on 09/26/2023 to more accurately reflect our healthy population. There may be differences in the flagging of prior results with similar values performed with this method. Interpretation of those prior results can be made in the context of the updated reference intervals.      Potassium   Date Value Ref Range Status   04/05/2024 3.5 3.4 - 5.3 mmol/L Final   10/21/2021   Final     Comment:     Specimen hemolyzed, result invalid     Chloride   Date Value Ref Range Status   04/05/2024 96 (L) 98 - 107 mmol/L Final   10/21/2021 97 (L) 98 - 107 mmol/L Final     Carbon Dioxide (CO2)   Date Value Ref Range Status   04/05/2024 29 22 - 29 mmol/L Final   10/21/2021 27 22 - 31 mmol/L Final      Anion Gap   Date Value Ref Range Status   04/05/2024 9 7 - 15 mmol/L Final   10/21/2021 10 5 - 18 mmol/L Final     Glucose   Date Value Ref Range Status   04/05/2024 114 (H) 70 - 99 mg/dL Final   10/21/2021 105 70 - 125 mg/dL Final     Urea Nitrogen   Date Value Ref Range Status   04/05/2024 9.1 8.0 - 23.0 mg/dL Final   10/21/2021 8 8 - 22 mg/dL Final     Creatinine   Date Value Ref Range Status   04/05/2024 0.70 0.51 - 0.95 mg/dL Final     GFR Estimate   Date Value Ref Range Status   04/05/2024 >90 >60 mL/min/1.73m2 Final   08/20/2020 >60 >60 mL/min/1.73m2 Final     GFR, ESTIMATED POCT   Date Value Ref Range Status   03/21/2024 >60 >60 mL/min/1.73m2 Final     Calcium   Date Value Ref Range Status   04/05/2024 9.1 8.8 - 10.2 mg/dL Final     Liver Function Studies -   Recent Labs   Lab Test 04/05/24  1042   PROTTOTAL 5.8*   ALBUMIN 4.0   BILITOTAL 0.5   ALKPHOS 46   AST 26   ALT 28         Imaging:      Narrative & Impression   EXAM: CT CHEST PULMONARY EMBOLISM W CONTRAST  LOCATION: [facility]  DATE: 12/22/2023     INDICATION: Dyspnea, lower extremity swelling right greater than left, hypoxia  COMPARISON: None.  TECHNIQUE: CT chest pulmonary angiogram during arterial phase injection of IV contrast. Multiplanar reformats and MIP reconstructions were performed. Dose reduction techniques were used.   CONTRAST: 90ml isovue 370     FINDINGS:  ANGIOGRAM CHEST: Filling defects within the segmental and subsegmental branches of all lobes bilaterally. Thoracic aorta is negative for dissection. RV/LV ratio abnormal, greater than 1.0.     LUNGS AND PLEURA: Normal.     MEDIASTINUM/AXILLAE: Normal.     CORONARY ARTERY CALCIFICATION: Mild.     UPPER ABDOMEN: Postsurgical changes in the stomach. Tiny benign angiomyolipoma left kidney.     MUSCULOSKELETAL: Multilevel degenerative changes in the spine.                                                                      IMPRESSION:  Acute segmental and  subsegmental emboli bilaterally with secondary signs of right heart strain. No evidence of pulmonary infarct.         BLE USE 12/2023   IMPRESSION:  1.  No deep venous thrombosis in the bilateral lower extremities.  2.  Bilateral lower extremity subcutaneous edema.    Assessment:  Unprovoked distal lower extremity DVT, remote history   Minimally provoked pulmonary embolism 12/2023   In setting of acute illness, bedrest, metabolic derangement, dehydration  Was on Xarelto - poor absorption thus changed to Eliquis in 1/2024.  History of superficial thrombophlebitis   S/P gastric bypass   MONA, B12 deficiency   PENNIE   Coronary artery disease     Plan:  Dandy is an appropriate candidate to stay on long term anticoagulation given that she has had recurrent venous thromboembolism events and has family history of thrombosis. She also has ongoing risk factors that put her at increased risk of recurrent events. We  discuss reduction in anticoagulation to prophylactic anticoagulation  however she is tolerating therapeutic dose without any concerns thus will continue at current dose for now.     Apixaban level today. Her last dose is within goal timeframe.    Compression socks recommended.     Ok to proceed with right TKA  - plan to hold Eliquis x 48 hours prior to surgery and resume within 12-24 hours afterwards.       Jyoti Figueroa, MPH, PA-C  [facility]    30 minutes spent by me on the date of the encounter doing chart review, review of outside records, review of test results, interpretation of tests, patient visit, and documentation

## 2024-05-22 ENCOUNTER — OFFICE VISIT (OUTPATIENT)
Dept: HEMATOLOGY | Facility: CLINIC | Age: 69
End: 2024-05-22
Attending: PHYSICIAN ASSISTANT
Payer: COMMERCIAL

## 2024-05-22 VITALS
HEART RATE: 66 BPM | OXYGEN SATURATION: 98 % | WEIGHT: 191.2 LBS | DIASTOLIC BLOOD PRESSURE: 84 MMHG | HEIGHT: 61 IN | BODY MASS INDEX: 36.1 KG/M2 | TEMPERATURE: 97.4 F | SYSTOLIC BLOOD PRESSURE: 132 MMHG

## 2024-05-22 DIAGNOSIS — Z79.01 LONG TERM CURRENT USE OF ANTICOAGULANT THERAPY: ICD-10-CM

## 2024-05-22 DIAGNOSIS — Z86.711 HISTORY OF PULMONARY EMBOLISM: Primary | ICD-10-CM

## 2024-05-22 DIAGNOSIS — Z98.84 GASTRIC BYPASS STATUS FOR OBESITY: ICD-10-CM

## 2024-05-22 DIAGNOSIS — Z98.84 HISTORY OF GASTRIC BYPASS: ICD-10-CM

## 2024-05-22 DIAGNOSIS — I26.09 ACUTE PULMONARY EMBOLISM WITH ACUTE COR PULMONALE, UNSPECIFIED PULMONARY EMBOLISM TYPE (H): ICD-10-CM

## 2024-05-22 DIAGNOSIS — Z86.718 PERSONAL HISTORY OF DVT (DEEP VEIN THROMBOSIS): ICD-10-CM

## 2024-05-22 PROCEDURE — 99214 OFFICE O/P EST MOD 30 MIN: CPT | Performed by: PHYSICIAN ASSISTANT

## 2024-05-22 PROCEDURE — G0463 HOSPITAL OUTPT CLINIC VISIT: HCPCS | Performed by: PHYSICIAN ASSISTANT

## 2024-05-22 PROCEDURE — 36415 COLL VENOUS BLD VENIPUNCTURE: CPT | Performed by: PHYSICIAN ASSISTANT

## 2024-05-22 PROCEDURE — 80299 QUANTITATIVE ASSAY DRUG: CPT | Performed by: PHYSICIAN ASSISTANT

## 2024-05-22 RX ORDER — FAMOTIDINE 20 MG/1
20 TABLET, FILM COATED ORAL PRN
COMMUNITY
Start: 2024-05-22

## 2024-05-23 LAB — APIXABAN PPP CHRO-MCNC: 193 NG/ML

## 2024-06-03 ENCOUNTER — OFFICE VISIT (OUTPATIENT)
Dept: INTERNAL MEDICINE | Facility: CLINIC | Age: 69
End: 2024-06-03
Payer: COMMERCIAL

## 2024-06-03 VITALS
SYSTOLIC BLOOD PRESSURE: 100 MMHG | BODY MASS INDEX: 35.3 KG/M2 | TEMPERATURE: 97.5 F | HEIGHT: 61 IN | DIASTOLIC BLOOD PRESSURE: 60 MMHG | HEART RATE: 73 BPM | WEIGHT: 187 LBS | RESPIRATION RATE: 18 BRPM | OXYGEN SATURATION: 97 %

## 2024-06-03 DIAGNOSIS — I25.84 CORONARY ARTERY DISEASE DUE TO CALCIFIED CORONARY LESION: ICD-10-CM

## 2024-06-03 DIAGNOSIS — Z51.81 ENCOUNTER FOR THERAPEUTIC DRUG MONITORING: ICD-10-CM

## 2024-06-03 DIAGNOSIS — I25.10 CORONARY ARTERY DISEASE DUE TO CALCIFIED CORONARY LESION: ICD-10-CM

## 2024-06-03 DIAGNOSIS — M17.11 PRIMARY OSTEOARTHRITIS OF RIGHT KNEE: ICD-10-CM

## 2024-06-03 DIAGNOSIS — Z01.818 PREOPERATIVE EXAMINATION: Primary | ICD-10-CM

## 2024-06-03 DIAGNOSIS — G47.33 OBSTRUCTIVE SLEEP APNEA ON CPAP: ICD-10-CM

## 2024-06-03 DIAGNOSIS — Z98.84 HISTORY OF GASTRIC BYPASS: ICD-10-CM

## 2024-06-03 DIAGNOSIS — I10 ESSENTIAL HYPERTENSION: ICD-10-CM

## 2024-06-03 DIAGNOSIS — Z86.711 HISTORY OF PULMONARY EMBOLISM: ICD-10-CM

## 2024-06-03 LAB
ERYTHROCYTE [DISTWIDTH] IN BLOOD BY AUTOMATED COUNT: 11.7 % (ref 10–15)
HCT VFR BLD AUTO: 37.3 % (ref 35–47)
HGB BLD-MCNC: 12.4 G/DL (ref 11.7–15.7)
MCH RBC QN AUTO: 32.2 PG (ref 26.5–33)
MCHC RBC AUTO-ENTMCNC: 33.2 G/DL (ref 31.5–36.5)
MCV RBC AUTO: 97 FL (ref 78–100)
PLATELET # BLD AUTO: 295 10E3/UL (ref 150–450)
RBC # BLD AUTO: 3.85 10E6/UL (ref 3.8–5.2)
WBC # BLD AUTO: 6.4 10E3/UL (ref 4–11)

## 2024-06-03 PROCEDURE — 93005 ELECTROCARDIOGRAM TRACING: CPT | Performed by: INTERNAL MEDICINE

## 2024-06-03 PROCEDURE — 85027 COMPLETE CBC AUTOMATED: CPT | Performed by: INTERNAL MEDICINE

## 2024-06-03 PROCEDURE — 99215 OFFICE O/P EST HI 40 MIN: CPT | Mod: 25 | Performed by: INTERNAL MEDICINE

## 2024-06-03 PROCEDURE — 96372 THER/PROPH/DIAG INJ SC/IM: CPT | Performed by: INTERNAL MEDICINE

## 2024-06-03 PROCEDURE — 80053 COMPREHEN METABOLIC PANEL: CPT | Performed by: INTERNAL MEDICINE

## 2024-06-03 PROCEDURE — 36415 COLL VENOUS BLD VENIPUNCTURE: CPT | Performed by: INTERNAL MEDICINE

## 2024-06-03 RX ORDER — LOSARTAN POTASSIUM 25 MG/1
25 TABLET ORAL DAILY
Status: SHIPPED
Start: 2024-06-03

## 2024-06-03 RX ADMIN — CYANOCOBALAMIN 1000 MCG: 1000 INJECTION, SOLUTION INTRAMUSCULAR; SUBCUTANEOUS at 13:48

## 2024-06-03 NOTE — PROGRESS NOTES
Preoperative Evaluation  [facility]  [address] [facility] [address]  Phone: [phone]  Fax: [phone]  Primary Provider: Suman Doshi MD  Pre-op Performing Provider: Suman Doshi MD  Reece 3, 2024             5/29/2024   Surgical Information   What procedure is being done? Right knee replacement   Facility or Hospital where procedure/surgery will be performed: [facility]   Who is doing the procedure / surgery? Ambrose Solorio   Date of surgery / procedure: June 20, 2024   Time of surgery / procedure: NA   Where do you plan to recover after surgery? at home with family     Fax number for surgical facility: [phone]    Assessment & Plan     The proposed surgical procedure is considered INTERMEDIATE risk.    Preoperative examination  Patient is cleared to proceed with inpatient right total knee replacement as planned.    No active infection issues.  No cardiac symptoms with adequate exertional ability over 4 METS, activity just limited by knee pain.    I discussed risk of surgery including anesthesia risk with aspiration pneumonia and heart arrhythmia risk, bleeding risk from surgery, risk of pulmonary embolism, risk of pain and scar tissue developing after knee replacement, and other risks.  Patient accepts risk of surgery and wishes to proceed.    I discussed need for dental prophylaxis with antibiotics and did not have dental work for at least 2 months after surgery.  - EKG 12-lead, tracing only  - Comprehensive metabolic panel  - CBC with platelets    Patient instructions:  Do not take the Eliquis for 2 days before the knee surgery.  Do not take Eliquis the day of the surgery.  Plan to restart Eliquis the day after the surgery.    Make sure you do the range of motion exercises as instructed after surgery.  These exercises are very important to prevent the knee from healing in a stiff and painful fashion.    Do not take the hydrochlorothiazide the day  before surgery for day of surgery.  Do not take losartan the day of surgery.    You can take the rosuvastatin the night before surgery.    Nothing to eat after midnight for day of surgery.    Bring your CPAP machine with you to the hospital.    Do not take aspirin, Aleve or ibuprofen type medicines with your Eliquis.  You can use Tylenol for pain management.    Use MiraLAX or milk of magnesia as needed for constipation    See me in August for your physical exam.      Primary osteoarthritis of right knee  Chronic pain that affects mobility, with indication for knee replacement    History of pulmonary embolism  I did discuss that she is increased risk for pulmonary embolism with this surgery.  She will be off the Eliquis for 3 days, as above.  I also discussed bleeding risk with going back on the Eliquis after surgery.  Patient accepts risk of surgery and wishes to proceed    Obstructive sleep apnea on CPAP  Highly compliant with CPAP.    Controlled    Coronary artery disease due to calcified coronary lesion  Asymptomatic, as not had an event.  Continues on rosuvastatin in the evening.  On Eliquis for blood thinner.    Sees me in August for physical exam    Essential hypertension  Well-controlled, borderline low.  Running 110-120/60-70 on her home checks.  She will skip the hydrochlorothiazide the night before and skip the losartan on the morning of surgery    She will stay on the lower 25 mg losartan in the morning only.  Takes hydrochlorothiazide at night    History of gastric bypass  Stable, takes vitamin supplements and monthly B12 shots.  B12 shot today.    Encounter for therapeutic drug monitoring    - Comprehensive metabolic panel      Time with patient greater than 40 minutes with greater than 50% time coordination of care and discussion of her preop issues.  We discussed anticoagulation plan.  I also discussed with , Giuseppe    Recommendation  Approval given to proceed with proposed procedure, without  further diagnostic evaluation.        Clara Dorman is a 69 year old, presenting for the following:  Pre-Op Exam (Rt knee replacement, 6/20, @ [facility], Dr Solorio)          6/3/2024     1:07 PM   Additional Questions   Roomed by Raven PUCKETT   Accompanied by      HPI related to upcoming procedure: Patient has had longstanding right knee DJD.  She had a leg injury as a teenager many years ago.  Slowly progressive knee pain over the past few years.  It does affect her ability to walk long distances now.  She still walking about 1000 steps a day, but limited by her l knee.  No shortness of breath or chest pain.    She has nonobstructive coronary disease based on a CT angiogram February 2024 with a calcium score of 584.  She has not had chest pain or event.  Still on rosuvastatin.  Family history of coronary disease positive.    She does have obstructive sleep apnea but compliant with CPAP.  No lower extremity edema or increasing shortness of breath.  No history of heart failure or atrial fibrillation or stroke.    She has had recurrent pulmonary embolism after a GI illness December 2023.  She has been on anticoagulation for over 6 months.  She was given okay by her hematology clinic to proceed with knee replacement after anticoagulation for 6 months.  He had a previous pulmonary embolism over 4 years ago and has a family history of pulmonary embolism with her mother and grandmother.    Plan is for lifelong anticoagulation with Eliquis.    No dental infection or dental issues.  No skin infections or ingrown toenails.  No recent cough or respiratory infection.  No history of UTI, no current UTI symptoms.  Bowels are regular without history of GI bleeding.    Past history of gastric bypass and takes B12 shots and vitamins.    Hysterectomy in 1993.    She is never smoked.    Melanoma of the right arm in the past without evidence of recurrence.    2016 DEXA scan with a spine score of -1.0 and femur score  -1.4/-1.3.  No history of fracture.            5/29/2024   Pre-Op Questionnaire   Have you ever had a heart attack or stroke? No   Have you ever had surgery on your heart or blood vessels, such as a stent placement, a coronary artery bypass, or surgery on an artery in your head, neck, heart, or legs? No   Do you have chest pain with activity? No   Do you have a history of heart failure? No   Do you currently have a cold, bronchitis or symptoms of other infection? No   Do you have a cough, shortness of breath, or wheezing? No   Do you or anyone in your family have previous history of blood clots? (!) YES    Do you or does anyone in your family have a serious bleeding problem such as prolonged bleeding following surgeries or cuts? No   Have you ever had problems with anemia or been told to take iron pills? (!) YES    Have you had any abnormal blood loss such as black, tarry or bloody stools, or abnormal vaginal bleeding? No   Have you ever had a blood transfusion? (!) UNKNOWN   Are you willing to have a blood transfusion if it is medically needed before, during, or after your surgery? Yes   Have you or any of your relatives ever had problems with anesthesia? No   Do you have sleep apnea, excessive snoring or daytime drowsiness? (!) YES   Do you have a CPAP machine? Yes   Do you have any artifical heart valves or other implanted medical devices like a pacemaker, defibrillator, or continuous glucose monitor? No   Do you have artificial joints? No   Are you allergic to latex? No     Health Care Directive  Patient does not have a Health Care Directive or Living Will: Patient is full code    Preoperative Review of    reviewed - no record of controlled substances prescribed.          Patient Active Problem List    Diagnosis Date Noted    Dyslipidemia, goal LDL below 70 02/26/2024     Priority: Medium    Bilateral leg edema 02/26/2024     Priority: Medium    Coronary artery calcification seen on CAT scan 02/26/2024      Priority: Medium    Class 2 severe obesity due to excess calories with serious comorbidity in adult (H) 01/29/2024     Priority: Medium    Dyspnea on exertion 12/22/2023     Priority: Medium    Acute pulmonary embolism with acute cor pulmonale, unspecified pulmonary embolism type (H) 12/22/2023     Priority: Medium    Obstructive sleep apnea      Priority: Medium     Created by Conversion        Essential hypertension 08/02/2017     Priority: Medium    Adenomatous colon polyp (02/2014) 01/16/2015     Priority: Medium    Post-gastric Bypass For Obesity      Priority: Medium     Created by Conversion        Thrombophlebitis Of The Left Deep Femoral Vein      Priority: Medium     Created by Conversion        Nephrolithiasis      Priority: Medium     Created by Conversion        Acquired absence of organ, genital organs 12/20/2007     Priority: Medium    Postmenopausal atrophic vaginitis 12/18/2007     Priority: Medium      Past Medical History:   Diagnosis Date    Benign essential hypertension     DVT (deep venous thrombosis) (H)     Dyslipidemia     Gastric bypass status for obesity     Pulmonary embolism (H)      Past Surgical History:   Procedure Laterality Date    CHOLECYSTECTOMY      GASTRIC BYPASS  1980    HYSTERECTOMY TOTAL ABDOMINAL  1993    RELEASE CARPAL TUNNEL Right     TONSILLECTOMY      TUBAL LIGATION       Current Outpatient Medications   Medication Sig Dispense Refill    apixaban ANTICOAGULANT (ELIQUIS ANTICOAGULANT) 5 MG tablet Take 1 tablet (5 mg) by mouth 2 times daily 180 tablet 3    Calcium Carbonate-Vit D-Min (CALCIUM 1200 PO) Take 1 tablet by mouth 2 times daily      cholecalciferol, vitamin D3, 5,000 unit Tab [CHOLECALCIFEROL, VITAMIN D3, 5,000 UNIT TAB] Take 1 tablet by mouth daily.       famotidine (PEPCID) 20 MG tablet Take 1 tablet (20 mg) by mouth as needed (heartburn)      ferrous sulfate 325 (65 FE) MG tablet [FERROUS SULFATE 325 (65 FE) MG TABLET] Take 325 mg by mouth.      furosemide  "(LASIX) 40 MG tablet Take 1 tablet (40 mg) by mouth as needed (leeg edema) 30 tablet 1    hydrochlorothiazide (HYDRODIURIL) 25 MG tablet Take 1 tablet (25 mg) by mouth daily 90 tablet 3    losartan (COZAAR) 25 MG tablet Take 1 tablet (25 mg) by mouth 2 times daily 180 tablet 3    Probiotic Product (DAILY DIGESTIVE PROBIOTIC PO) Take 1 capsule by mouth daily      rosuvastatin (CRESTOR) 20 MG tablet Take 1 tablet (20 mg) by mouth daily Please call PCP for refills 90 tablet 2    triamcinolone (KENALOG) 0.1 % cream [TRIAMCINOLONE (KENALOG) 0.1 % CREAM] Apply to affected areas 3 times a day for 2 weeks (Patient taking differently: Apply topically 3 times daily as needed for irritation) 45 g 1       No Known Allergies     Social History     Tobacco Use    Smoking status: Never     Passive exposure: Past    Smokeless tobacco: Never   Substance Use Topics    Alcohol use: Yes     Alcohol/week: 7.0 standard drinks of alcohol     Types: 7 Standard drinks or equivalent per week     Family History   Problem Relation Age of Onset    Heart Failure Mother     Atrial fibrillation Mother     Macular Degeneration Mother     Diabetes Type 2  Father         Older-age onset of diabetes for multiple family members in father's side    Cancer Sister         \"Female cancer wih lymph node removal\"    Glaucoma Sister         Being watched for glaucoma    Depression Sister     Fibromyalgia Sister     Glaucoma Sister      History   Drug Use No             Review of Systems  Constitutional, HEENT, cardiovascular, pulmonary, GI, , musculoskeletal, neuro, skin, endocrine and psych systems are negative, except as otherwise noted.    Objective    /60   Pulse 73   Temp 97.5  F (36.4  C)   Resp 18   Ht 1.549 m (5' 1\")   Wt 84.8 kg (187 lb)   LMP  (LMP Unknown)   SpO2 97%   BMI 35.33 kg/m     Estimated body mass index is 35.33 kg/m  as calculated from the following:    Height as of this encounter: 1.549 m (5' 1\").    Weight as of this " encounter: 84.8 kg (187 lb).  Physical Exam  Aler and oriented x 3.  Good mood and affect.  Normal cognition.  Able to climb up on exam table.  Pupils and irises equal and reactive.  Extraocular muscles intact.  No jaundice or conjunctivitis.  Mildly crowded pharynx but not severe.  No pharyngitis.  Teeth in good condition.  No cervical or supraclavicular adenopathy or neck mass.  No carotid bruits.  No JVD.  Lungs clear to auscultation with good respiratory excursion.  Heart is regular without murmur rub or gallop.  Abdomen is nontender, no hepatosplenomegaly or pulsatile abdominal mass.  Feet exam without foot sores or ingrown toenails.  Just borderline trace ankle edema, almost none bilaterally.    Recent Labs   Lab Test 04/05/24  1042 03/21/24  1002 01/29/24  1302 12/23/23  1327 12/23/23  0602   HGB  --   --  13.0  --  11.8   PLT  --   --  319  --  212   *  --  137   < > 134*   POTASSIUM 3.5  --  4.0   < > 3.2*   CR 0.70 0.7 0.74   < > 0.60    < > = values in this interval not displayed.        Diagnostics  Labs pending at this time.  Results will be reviewed when available.  Recent Results (from the past 240 hour(s))   EKG 12-lead, tracing only    Collection Time: 06/03/24  1:24 PM   Result Value Ref Range    Systolic Blood Pressure  mmHg    Diastolic Blood Pressure  mmHg    Ventricular Rate 68 BPM    Atrial Rate 68 BPM    NY Interval 204 ms    QRS Duration 92 ms     ms    QTc 440 ms    P Axis 54 degrees    R AXIS 14 degrees    T Axis 54 degrees    Interpretation ECG       Sinus rhythm  Normal ECG  When compared with ECG of 22-DEC-2023 11:34,  No significant change was found        EKG reviewed with patient, normal EKG    Revised Cardiac Risk Index (RCRI)  The patient has the following serious cardiovascular risks for perioperative complications:   - No serious cardiac risks = 0 points     RCRI Interpretation: 0 points: Class I (very low risk - 0.4% complication rate)         Signed Electronically  by: Suman Doshi MD  Copy of this evaluation report is provided to requesting physician.

## 2024-06-03 NOTE — PATIENT INSTRUCTIONS
Do not take the Eliquis for 2 days before the knee surgery.  Do not take Eliquis the day of the surgery.  Plan to restart Eliquis the day after the surgery.    Make sure you do the range of motion exercises as instructed after surgery.  These exercises are very important to prevent the knee from healing in a stiff and painful fashion.    Do not take the hydrochlorothiazide the day before surgery for day of surgery.  Do not take losartan the day of surgery.    You can take the rosuvastatin the night before surgery.    Nothing to eat after midnight for day of surgery.    Bring your CPAP machine with you to the hospital.    Do not take aspirin, Aleve or ibuprofen type medicines with your Eliquis.  You can use Tylenol for pain management.    Use MiraLAX or milk of magnesia as needed for constipation    See me in August for your physical exam.

## 2024-06-04 LAB
ALBUMIN SERPL BCG-MCNC: 3.6 G/DL (ref 3.5–5.2)
ALP SERPL-CCNC: 42 U/L (ref 40–150)
ALT SERPL W P-5'-P-CCNC: 29 U/L (ref 0–50)
ANION GAP SERPL CALCULATED.3IONS-SCNC: 10 MMOL/L (ref 7–15)
AST SERPL W P-5'-P-CCNC: 28 U/L (ref 0–45)
ATRIAL RATE - MUSE: 68 BPM
BILIRUB SERPL-MCNC: 0.3 MG/DL
BUN SERPL-MCNC: 12.8 MG/DL (ref 8–23)
CALCIUM SERPL-MCNC: 8.5 MG/DL (ref 8.8–10.2)
CHLORIDE SERPL-SCNC: 95 MMOL/L (ref 98–107)
CREAT SERPL-MCNC: 0.91 MG/DL (ref 0.51–0.95)
DEPRECATED HCO3 PLAS-SCNC: 29 MMOL/L (ref 22–29)
DIASTOLIC BLOOD PRESSURE - MUSE: NORMAL MMHG
EGFRCR SERPLBLD CKD-EPI 2021: 67 ML/MIN/1.73M2
GLUCOSE SERPL-MCNC: 103 MG/DL (ref 70–99)
INTERPRETATION ECG - MUSE: NORMAL
P AXIS - MUSE: 54 DEGREES
POTASSIUM SERPL-SCNC: 4.3 MMOL/L (ref 3.4–5.3)
PR INTERVAL - MUSE: 204 MS
PROT SERPL-MCNC: 5.3 G/DL (ref 6.4–8.3)
QRS DURATION - MUSE: 92 MS
QT - MUSE: 414 MS
QTC - MUSE: 440 MS
R AXIS - MUSE: 14 DEGREES
SODIUM SERPL-SCNC: 134 MMOL/L (ref 135–145)
SYSTOLIC BLOOD PRESSURE - MUSE: NORMAL MMHG
T AXIS - MUSE: 54 DEGREES
VENTRICULAR RATE- MUSE: 68 BPM

## 2024-06-04 PROCEDURE — 93010 ELECTROCARDIOGRAM REPORT: CPT | Performed by: INTERNAL MEDICINE

## 2024-07-02 ENCOUNTER — TRANSFERRED RECORDS (OUTPATIENT)
Dept: HEALTH INFORMATION MANAGEMENT | Facility: CLINIC | Age: 69
End: 2024-07-02
Payer: COMMERCIAL

## 2024-07-03 ENCOUNTER — ALLIED HEALTH/NURSE VISIT (OUTPATIENT)
Dept: FAMILY MEDICINE | Facility: CLINIC | Age: 69
End: 2024-07-03
Payer: COMMERCIAL

## 2024-07-03 DIAGNOSIS — E53.8 VITAMIN B12 DEFICIENCY (NON ANEMIC): Primary | ICD-10-CM

## 2024-07-03 PROCEDURE — 99207 PR NO CHARGE NURSE ONLY: CPT

## 2024-07-03 PROCEDURE — 96372 THER/PROPH/DIAG INJ SC/IM: CPT | Mod: LT | Performed by: INTERNAL MEDICINE

## 2024-07-03 RX ADMIN — CYANOCOBALAMIN 1000 MCG: 1000 INJECTION, SOLUTION INTRAMUSCULAR; SUBCUTANEOUS at 11:05

## 2024-07-30 ENCOUNTER — TRANSFERRED RECORDS (OUTPATIENT)
Dept: HEALTH INFORMATION MANAGEMENT | Facility: CLINIC | Age: 69
End: 2024-07-30
Payer: COMMERCIAL

## 2024-07-31 ENCOUNTER — ALLIED HEALTH/NURSE VISIT (OUTPATIENT)
Dept: FAMILY MEDICINE | Facility: CLINIC | Age: 69
End: 2024-07-31
Payer: COMMERCIAL

## 2024-07-31 DIAGNOSIS — E53.8 VITAMIN B12 DEFICIENCY (NON ANEMIC): Primary | ICD-10-CM

## 2024-07-31 PROCEDURE — 99207 PR NO CHARGE NURSE ONLY: CPT

## 2024-07-31 PROCEDURE — 96372 THER/PROPH/DIAG INJ SC/IM: CPT | Performed by: INTERNAL MEDICINE

## 2024-07-31 RX ADMIN — CYANOCOBALAMIN 1000 MCG: 1000 INJECTION, SOLUTION INTRAMUSCULAR; SUBCUTANEOUS at 11:02

## 2024-08-02 ENCOUNTER — PATIENT OUTREACH (OUTPATIENT)
Dept: CARE COORDINATION | Facility: CLINIC | Age: 69
End: 2024-08-02
Payer: COMMERCIAL

## 2024-08-18 DIAGNOSIS — I10 ESSENTIAL HYPERTENSION: ICD-10-CM

## 2024-08-19 RX ORDER — HYDROCHLOROTHIAZIDE 25 MG/1
25 TABLET ORAL DAILY
Qty: 90 TABLET | Refills: 2 | Status: SHIPPED | OUTPATIENT
Start: 2024-08-19

## 2024-08-23 SDOH — HEALTH STABILITY: PHYSICAL HEALTH: ON AVERAGE, HOW MANY DAYS PER WEEK DO YOU ENGAGE IN MODERATE TO STRENUOUS EXERCISE (LIKE A BRISK WALK)?: 3 DAYS

## 2024-08-23 SDOH — HEALTH STABILITY: PHYSICAL HEALTH: ON AVERAGE, HOW MANY MINUTES DO YOU ENGAGE IN EXERCISE AT THIS LEVEL?: 30 MIN

## 2024-08-23 ASSESSMENT — SOCIAL DETERMINANTS OF HEALTH (SDOH): HOW OFTEN DO YOU GET TOGETHER WITH FRIENDS OR RELATIVES?: THREE TIMES A WEEK

## 2024-08-28 ENCOUNTER — OFFICE VISIT (OUTPATIENT)
Dept: INTERNAL MEDICINE | Facility: CLINIC | Age: 69
End: 2024-08-28
Payer: COMMERCIAL

## 2024-08-28 VITALS
DIASTOLIC BLOOD PRESSURE: 68 MMHG | HEIGHT: 61 IN | TEMPERATURE: 98 F | HEART RATE: 68 BPM | BODY MASS INDEX: 34.74 KG/M2 | RESPIRATION RATE: 18 BRPM | SYSTOLIC BLOOD PRESSURE: 122 MMHG | WEIGHT: 184 LBS | OXYGEN SATURATION: 98 %

## 2024-08-28 DIAGNOSIS — G47.33 OBSTRUCTIVE SLEEP APNEA ON CPAP: ICD-10-CM

## 2024-08-28 DIAGNOSIS — E78.00 HYPERCHOLESTEROLEMIA: ICD-10-CM

## 2024-08-28 DIAGNOSIS — Z85.820 HISTORY OF MELANOMA: ICD-10-CM

## 2024-08-28 DIAGNOSIS — Z00.00 ENCOUNTER FOR WELLNESS EXAMINATION IN ADULT: Primary | ICD-10-CM

## 2024-08-28 DIAGNOSIS — Z86.711 HISTORY OF PULMONARY EMBOLISM: ICD-10-CM

## 2024-08-28 DIAGNOSIS — R93.1 AGATSTON CORONARY ARTERY CALCIUM SCORE GREATER THAN 400: ICD-10-CM

## 2024-08-28 DIAGNOSIS — Z98.84 BARIATRIC SURGERY STATUS: ICD-10-CM

## 2024-08-28 DIAGNOSIS — Z51.81 ENCOUNTER FOR THERAPEUTIC DRUG MONITORING: ICD-10-CM

## 2024-08-28 DIAGNOSIS — I10 ESSENTIAL HYPERTENSION: ICD-10-CM

## 2024-08-28 DIAGNOSIS — E53.8 VITAMIN B12 DEFICIENCY (NON ANEMIC): ICD-10-CM

## 2024-08-28 LAB
ALBUMIN SERPL BCG-MCNC: 3.6 G/DL (ref 3.5–5.2)
ALP SERPL-CCNC: 44 U/L (ref 40–150)
ALT SERPL W P-5'-P-CCNC: 15 U/L (ref 0–50)
ANION GAP SERPL CALCULATED.3IONS-SCNC: 11 MMOL/L (ref 7–15)
AST SERPL W P-5'-P-CCNC: 20 U/L (ref 0–45)
BILIRUB SERPL-MCNC: 0.3 MG/DL
BUN SERPL-MCNC: 9.5 MG/DL (ref 8–23)
CALCIUM SERPL-MCNC: 9 MG/DL (ref 8.8–10.4)
CHLORIDE SERPL-SCNC: 97 MMOL/L (ref 98–107)
CHOLEST SERPL-MCNC: 95 MG/DL
CREAT SERPL-MCNC: 0.66 MG/DL (ref 0.51–0.95)
EGFRCR SERPLBLD CKD-EPI 2021: >90 ML/MIN/1.73M2
ERYTHROCYTE [DISTWIDTH] IN BLOOD BY AUTOMATED COUNT: 12.2 % (ref 10–15)
FASTING STATUS PATIENT QL REPORTED: YES
FASTING STATUS PATIENT QL REPORTED: YES
GLUCOSE SERPL-MCNC: 107 MG/DL (ref 70–99)
HCO3 SERPL-SCNC: 28 MMOL/L (ref 22–29)
HCT VFR BLD AUTO: 35.8 % (ref 35–47)
HDLC SERPL-MCNC: 53 MG/DL
HGB BLD-MCNC: 11.8 G/DL (ref 11.7–15.7)
LDLC SERPL CALC-MCNC: 25 MG/DL
MCH RBC QN AUTO: 31.3 PG (ref 26.5–33)
MCHC RBC AUTO-ENTMCNC: 33 G/DL (ref 31.5–36.5)
MCV RBC AUTO: 95 FL (ref 78–100)
NONHDLC SERPL-MCNC: 42 MG/DL
PLATELET # BLD AUTO: 316 10E3/UL (ref 150–450)
POTASSIUM SERPL-SCNC: 3.6 MMOL/L (ref 3.4–5.3)
PROT SERPL-MCNC: 5.6 G/DL (ref 6.4–8.3)
RBC # BLD AUTO: 3.77 10E6/UL (ref 3.8–5.2)
SODIUM SERPL-SCNC: 136 MMOL/L (ref 135–145)
TRIGL SERPL-MCNC: 85 MG/DL
WBC # BLD AUTO: 5.3 10E3/UL (ref 4–11)

## 2024-08-28 PROCEDURE — 99214 OFFICE O/P EST MOD 30 MIN: CPT | Mod: 25 | Performed by: INTERNAL MEDICINE

## 2024-08-28 PROCEDURE — 96372 THER/PROPH/DIAG INJ SC/IM: CPT | Performed by: INTERNAL MEDICINE

## 2024-08-28 PROCEDURE — 80053 COMPREHEN METABOLIC PANEL: CPT | Performed by: INTERNAL MEDICINE

## 2024-08-28 PROCEDURE — G0439 PPPS, SUBSEQ VISIT: HCPCS | Performed by: INTERNAL MEDICINE

## 2024-08-28 PROCEDURE — 36415 COLL VENOUS BLD VENIPUNCTURE: CPT | Performed by: INTERNAL MEDICINE

## 2024-08-28 PROCEDURE — 85027 COMPLETE CBC AUTOMATED: CPT | Performed by: INTERNAL MEDICINE

## 2024-08-28 PROCEDURE — 80061 LIPID PANEL: CPT | Performed by: INTERNAL MEDICINE

## 2024-08-28 RX ADMIN — CYANOCOBALAMIN 1000 MCG: 1000 INJECTION, SOLUTION INTRAMUSCULAR; SUBCUTANEOUS at 10:55

## 2024-08-28 NOTE — PATIENT INSTRUCTIONS
Continue on current medications.    Continue to monitor your blood pressure.  Goal blood pressure less than 135/85.  If your blood pressure is running less than 110/60 more often or you are having lightheaded dizzy spells, contact me and I may have you reduce your hydrochlorothiazide to a half a pill a day.    I will also be checking your sodium level today.  If your sodium level is low, you also may need to reduce your hydrochlorothiazide.    Continue to be active with regular walking and going to the gym.  Continue your range of motion exercises on your right knee.    Continue yearly mammograms.    Your colonoscopy will be due July 2027.    Dermatology next month for your skin exam as planned.    Make sure you are seeing ophthalmology for yearly eye exam.    Plan to repeat your DEXA bone density scan in 1 year.    Get a flu shot and COVID shot this fall.    See me in 1 year for adult wellness visit physical exam, or sooner if needed for any issues or blood pressure concerns.

## 2024-08-28 NOTE — PROGRESS NOTES
Preventive Care Visit  [facility]  Suman Doshi MD, Internal Medicine  Aug 28, 2024        Dandy Mejia   69 year old female    Date of Visit: 8/28/2024    Chief Complaint   Patient presents with    Medicare Visit     fasting     Subjective  69-year-old male is here with , Giuseppe.  Patient had a successful right knee replacement June 2024.  She has some persistent right leg edema but tolerable.  She is wearing compression stockings intermittently.  She does have furosemide that she uses rarely for more severe leg edema.    She is still on losartan 25 mg in the morning and hydrochlorothiazide 25 mg in the evening.  Blood pressure has been running 110/50-1 20s/60s at home.    She does have a history of low sodium and sodium level was low 130 after surgery.  She denies nausea or significant lightheaded dizzy spells or unsteadiness at this time.    She does have a history of obstructive sleep apnea but highly compliant with CPAP.  Denies daytime sleepiness.  No palpitations.  No history of arrhythmia.    She is on Eliquis for history of pulmonary embolism.  No bleeding issues.    No cough or respiratory device.  She is never smoked.    Coronary artery disease based on a CT angiogram February 2024 with a calcium score 584 but no event.  Family history of coronary disease positive.  She is on rosuvastatin with excellent cholesterol levels in April.  No generalized myalgia complaints.    History of gastric bypass and gets monthly B12 shots.    Status post hysterectomy 1993.    Negative mammogram last year, denies any breast changes or lumps.  Has a mammogram scheduled for next week.    2023 DEXA scan showed a spine score of -0.5 and a femur score of -1.1/-1.7.  No falls or fracture history.    History of melanoma the right arm earlier this year.  She sees dermatology next month.  No new skin lesions.    She believes she seen the eye doctor within the past year for routine eye exam and  "denies vision problems or headaches.        PMHx:    Past Medical History:   Diagnosis Date    Benign essential hypertension     DVT (deep venous thrombosis) (H)     Dyslipidemia     Gastric bypass status for obesity     Pulmonary embolism (H)      PSHx:    Past Surgical History:   Procedure Laterality Date    CHOLECYSTECTOMY      GASTRIC BYPASS  1980    HYSTERECTOMY TOTAL ABDOMINAL  1993    RELEASE CARPAL TUNNEL Right     TONSILLECTOMY      TUBAL LIGATION       Immunizations:   Immunization History   Administered Date(s) Administered    COVID-19 12+ (2023-24) (MODERNA) 11/18/2023    COVID-19 Bivalent 18+ (Moderna) 10/12/2022    COVID-19 Monovalent 18+ (Moderna) 03/08/2021, 04/05/2021, 11/12/2021, 08/01/2022    DT (PEDS <7y) 02/05/2004    Flu, Unspecified 12/10/2007, 11/11/2008, 01/13/2010, 10/22/2010, 10/27/2011, 11/14/2012, 10/23/2014, 10/27/2015, 03/15/2018    Influenza (H1N1) 01/13/2010    Influenza (High Dose) 3 valent vaccine 10/29/2018, 09/21/2020    Influenza (IIV3) PF 10/25/2005, 11/17/2006, 12/10/2007, 11/11/2008, 10/23/2014    Influenza Vaccine 65+ (Fluzone HD) 09/21/2020, 10/21/2021, 10/31/2022, 11/02/2023    Influenza Vaccine >6 months,quad, PF 10/25/2019    Influenza Vaccine, 6+MO IM (QUADRIVALENT W/PRESERVATIVES) 10/23/2014, 10/27/2015, 10/25/2016, 10/16/2017    Influenza, seasonal, injectable, PF 10/22/2010, 10/27/2011, 11/14/2012    Pneumo Conj 13-V (2010&after) 03/15/2018    Pneumococcal 23 valent 03/28/2019    RSV Vaccine (Abrysvo) 12/12/2023    TDAP (Adacel,Boostrix) 01/08/2013    Td (Adult), Adsorbed 02/05/2004, 01/08/2013    Td,adult,historic,unspecified 02/05/2004, 01/08/2013    Zoster recombinant adjuvanted (SHINGRIX) 02/16/2024, 04/16/2024       ROS A comprehensive review of systems was performed and was otherwise negative    Medications, allergies, and problem list were reviewed and updated    Exam  /68   Pulse 68   Temp 98  F (36.7  C)   Resp 18   Ht 1.549 m (5' 1\")   Wt 83.5 kg " (184 lb)   LMP  (LMP Unknown)   SpO2 98%   BMI 34.77 kg/m    Alert and oriented x 3.  Able to clamp on exam table.  No parkinsonian signs.  Clock face drawing and word recall normal.  Does have +1 lower extremity edema on her right leg below the knee.  But incision is well-healed.  Trace ankle edema on the left.    Pupils and irises equal and reactive.  Extraocular muscles intact.  No jaundice or conjunctivitis.  External ears and nose exam is normal.  Normal tympanic membranes and no significant cerumen.  Pharynx appears normal.  Teeth in good condition.  No cervical or supraclavicular or axillary adenopathy.  No JVD and no carotid bruits.  No thyromegaly or nodularity to inspection and palpation.  Lungs are clear to auscultation with good respiratory excursion.  Heart is regular with no murmur rub or gallop.  Abdomen mildly overweight but nontender.  No hepatosplenomegaly and no pulsatile abdominal mass.  No suspicious skin lesions with well-healed scar on right arm.    Assessment/Plan  1. Encounter for wellness examination in adult  Plan focus for health maintenance is maintaining conditioning and weight with her history of sleep apnea and hypertension and coronary calcification.  I stressed the importance of regular exercise which she is getting.    Patient instructions for health maintenance plan.  Patient is full code.    Repeat bone density test next year    Status post hysterectomy    2. Obstructive sleep apnea on CPAP  Compliant with CPAP    3. History of pulmonary embolism  Long-term Eliquis    4. Agatston coronary artery calcium score greater than 400  Asymptomatic.  Continue rosuvastatin.  Goal LDL less than 100    5. Hypercholesterolemia  As above, tolerating rosuvastatin  - Lipid Profile    6. Essential hypertension  Blood pressure on the low side but tolerating.  Could consider reducing the HCTZ dose if low sodium or lower blood pressures.  Continue the losartan morning.    7. Post-gastric Bypass  For Obesity  Stable    8. Vitamin B12 deficiency (non anemic)  B12 shot today, gets monthly    9. History of melanoma  No evidence of recurrence.  Sees dermatology for routine checkup next month    10. Encounter for therapeutic drug monitoring    - CBC with platelets  - Comprehensive metabolic panel      Return in about 1 year (around 8/28/2025) for Adult wellness visit physical exam.   Patient Instructions   Continue on current medications.    Continue to monitor your blood pressure.  Goal blood pressure less than 135/85.  If your blood pressure is running less than 110/60 more often or you are having lightheaded dizzy spells, contact me and I may have you reduce your hydrochlorothiazide to a half a pill a day.    I will also be checking your sodium level today.  If your sodium level is low, you also may need to reduce your hydrochlorothiazide.    Continue to be active with regular walking and going to the gym.  Continue your range of motion exercises on your right knee.    Continue yearly mammograms.    Your colonoscopy will be due July 2027.    Dermatology next month for your skin exam as planned.    Make sure you are seeing ophthalmology for yearly eye exam.    Plan to repeat your DEXA bone density scan in 1 year.    Get a flu shot and COVID shot this fall.    See me in 1 year for adult wellness visit physical exam, or sooner if needed for any issues or blood pressure concerns.    Suman Doshi MD, MD        Current Outpatient Medications   Medication Sig Dispense Refill    apixaban ANTICOAGULANT (ELIQUIS ANTICOAGULANT) 5 MG tablet Take 1 tablet (5 mg) by mouth 2 times daily 180 tablet 3    Calcium Carbonate-Vit D-Min (CALCIUM 1200 PO) Take 1 tablet by mouth 2 times daily      cholecalciferol, vitamin D3, 5,000 unit Tab [CHOLECALCIFEROL, VITAMIN D3, 5,000 UNIT TAB] Take 1 tablet by mouth daily.       famotidine (PEPCID) 20 MG tablet Take 1 tablet (20 mg) by mouth as needed (heartburn)      ferrous sulfate 325  (65 FE) MG tablet [FERROUS SULFATE 325 (65 FE) MG TABLET] Take 325 mg by mouth.      furosemide (LASIX) 40 MG tablet Take 1 tablet (40 mg) by mouth as needed (leeg edema) 30 tablet 1    hydrochlorothiazide (HYDRODIURIL) 25 MG tablet TAKE ONE TABLET BY MOUTH EVERY DAY 90 tablet 2    losartan (COZAAR) 25 MG tablet Take 1 tablet (25 mg) by mouth daily      Probiotic Product (DAILY DIGESTIVE PROBIOTIC PO) Take 1 capsule by mouth daily      rosuvastatin (CRESTOR) 20 MG tablet Take 1 tablet (20 mg) by mouth daily Please call PCP for refills 90 tablet 2    triamcinolone (KENALOG) 0.1 % cream [TRIAMCINOLONE (KENALOG) 0.1 % CREAM] Apply to affected areas 3 times a day for 2 weeks (Patient taking differently: Apply topically 3 times daily as needed for irritation.) 45 g 1     No Known Allergies  Social History     Tobacco Use    Smoking status: Never     Passive exposure: Past    Smokeless tobacco: Never   Vaping Use    Vaping status: Never Used   Substance Use Topics    Alcohol use: Yes     Alcohol/week: 7.0 standard drinks of alcohol     Types: 7 Standard drinks or equivalent per week    Drug use: No             Subjective   Dandy is a 69 year old, presenting for the following:  Medicare Visit (fasting)        8/28/2024    10:17 AM   Additional Questions   Roomed by Raven PUCKETT   Accompanied by          Health Care Directive  Patient does not have a Health Care Directive or Living Will: Patient states has Advance Directive and will bring in a copy to clinic.    HPI              8/23/2024   General Health   How would you rate your overall physical health? (!) FAIR   Feel stress (tense, anxious, or unable to sleep) Not at all            8/23/2024   Nutrition   Diet: Low salt            8/23/2024   Exercise   Days per week of moderate/strenous exercise 3 days   Average minutes spent exercising at this level 30 min            8/23/2024   Social Factors   Frequency of gathering with friends or relatives Three times a week    Worry food won't last until get money to buy more No   Food not last or not have enough money for food? No   Do you have housing? (Housing is defined as stable permanent housing and does not include staying ouside in a car, in a tent, in an abandoned building, in an overnight shelter, or couch-surfing.) Yes   Are you worried about losing your housing? No   Lack of transportation? No   Unable to get utilities (heat,electricity)? No            8/23/2024   Fall Risk   Fallen 2 or more times in the past year? No    No   Trouble with walking or balance? No    Yes       Multiple values from one day are sorted in reverse-chronological order          8/23/2024   Activities of Daily Living- Home Safety   Needs help with the following daily activites None of the above   Safety concerns in the home None of the above            8/23/2024   Dental   Dentist two times every year? Yes            8/23/2024   Hearing Screening   Hearing concerns? None of the above            8/23/2024   Driving Risk Screening   Patient/family members have concerns about driving No            8/23/2024   General Alertness/Fatigue Screening   Have you been more tired than usual lately? No            8/23/2024   Urinary Incontinence Screening   Bothered by leaking urine in past 6 months No            8/23/2024   TB Screening   Were you born outside of the [place]? No            Today's PHQ-2 Score:       8/27/2024     9:26 PM   PHQ-2 ( 1999 Pf[employer])   Q1: Little interest or pleasure in doing things 0   Q2: Feeling down, depressed or hopeless 0   PHQ-2 Score 0   Q1: Little interest or pleasure in doing things Not at all   Q2: Feeling down, depressed or hopeless Not at all   PHQ-2 Score 0           8/23/2024   Substance Use   Alcohol more than 3/day or more than 7/wk No   Do you have a current opioid prescription? No   How severe/bad is pain from 1 to 10? 0/10 (No Pain)   Do you use any other substances recreationally? No        Social History     Tobacco  Use    Smoking status: Never     Passive exposure: Past    Smokeless tobacco: Never   Vaping Use    Vaping status: Never Used   Substance Use Topics    Alcohol use: Yes     Alcohol/week: 7.0 standard drinks of alcohol     Types: 7 Standard drinks or equivalent per week    Drug use: No           9/1/2023   LAST FHS-7 RESULTS   1st degree relative breast or ovarian cancer No   Any relative bilateral breast cancer No   Any male have breast cancer No   Any ONE woman have BOTH breast AND ovarian cancer No   Any woman with breast cancer before 50yrs No   2 or more relatives with breast AND/OR ovarian cancer No   2 or more relatives with breast AND/OR bowel cancer No               ASCVD Risk   The ASCVD Risk score (Cheri COLLINS, et al., 2019) failed to calculate for the following reasons:    The valid total cholesterol range is 130 to 320 mg/dL            Reviewed and updated as needed this visit by Provider                      Current providers sharing in care for this patient include:  Patient Care Team:  Suman Doshi MD as PCP - General (Internal Medicine)  Suman Doshi MD as Assigned PCP  Suman Doshi MD as Physician (Internal Medicine)  Karey Kemp MD as MD (Cardiovascular Disease)  Jyoti Avelar PA-C as Assigned Cancer Care Provider  Karey Kemp MD as Assigned Heart and Vascular Provider    The following health maintenance items are reviewed in Epic and correct as of today:  Health Maintenance   Topic Date Due    HF ACTION PLAN  Never done    ANNUAL REVIEW OF HM ORDERS  Never done    DTAP/TDAP/TD IMMUNIZATION (2 - Td or Tdap) 01/08/2023    COVID-19 Vaccine (7 - 2023-24 season) 03/18/2024    MEDICARE ANNUAL WELLNESS VISIT  08/17/2024    MAMMO SCREENING  09/01/2024    INFLUENZA VACCINE (1) 09/01/2024    BMP  12/03/2024    LIPID  04/05/2025    ALT  06/03/2025    CBC  06/03/2025    FALL RISK ASSESSMENT  08/28/2025    GLUCOSE  06/03/2027    COLORECTAL CANCER SCREENING  07/15/2027    ADVANCE  "CARE PLANNING  08/17/2028    DEXA  09/01/2038    TSH W/FREE T4 REFLEX  Completed    HEPATITIS C SCREENING  Completed    PHQ-2 (once per calendar year)  Completed    Pneumococcal Vaccine: 65+ Years  Completed    RSV VACCINE (Pregnancy & 60+)  Completed    ZOSTER IMMUNIZATION  Addressed    HPV IMMUNIZATION  Aged Out    MENINGITIS IMMUNIZATION  Aged Out    RSV MONOCLONAL ANTIBODY  Aged Out            Objective    Exam  /68   Pulse 68   Temp 98  F (36.7  C)   Resp 18   Ht 1.549 m (5' 1\")   Wt 83.5 kg (184 lb)   LMP  (LMP Unknown)   SpO2 98%   BMI 34.77 kg/m     Estimated body mass index is 34.77 kg/m  as calculated from the following:    Height as of this encounter: 1.549 m (5' 1\").    Weight as of this encounter: 83.5 kg (184 lb).    Physical Exam           8/28/2024   Mini Cog   Clock Draw Score 2 Normal    2 Normal   3 Item Recall 3 objects recalled    3 objects recalled   Mini Cog Total Score 5    5       Multiple values from one day are sorted in reverse-chronological order              Signed Electronically by: Suman Doshi MD    "

## 2024-09-03 ENCOUNTER — HOSPITAL ENCOUNTER (OUTPATIENT)
Dept: MAMMOGRAPHY | Facility: CLINIC | Age: 69
Discharge: HOME | End: 2024-09-03
Attending: INTERNAL MEDICINE | Admitting: INTERNAL MEDICINE
Payer: COMMERCIAL

## 2024-09-03 DIAGNOSIS — Z12.31 VISIT FOR SCREENING MAMMOGRAM: ICD-10-CM

## 2024-09-03 PROCEDURE — 77063 BREAST TOMOSYNTHESIS BI: CPT

## 2024-09-16 ENCOUNTER — TRANSFERRED RECORDS (OUTPATIENT)
Dept: HEALTH INFORMATION MANAGEMENT | Facility: CLINIC | Age: 69
End: 2024-09-16
Payer: COMMERCIAL

## 2024-09-30 ENCOUNTER — ALLIED HEALTH/NURSE VISIT (OUTPATIENT)
Dept: FAMILY MEDICINE | Facility: CLINIC | Age: 69
End: 2024-09-30
Payer: COMMERCIAL

## 2024-09-30 DIAGNOSIS — E53.8 VITAMIN B12 DEFICIENCY (NON ANEMIC): Primary | ICD-10-CM

## 2024-09-30 PROCEDURE — 96372 THER/PROPH/DIAG INJ SC/IM: CPT | Performed by: INTERNAL MEDICINE

## 2024-09-30 PROCEDURE — 99207 PR NO CHARGE NURSE ONLY: CPT

## 2024-09-30 RX ADMIN — CYANOCOBALAMIN 1000 MCG: 1000 INJECTION, SOLUTION INTRAMUSCULAR; SUBCUTANEOUS at 13:31

## 2024-10-08 ENCOUNTER — TRANSFERRED RECORDS (OUTPATIENT)
Dept: HEALTH INFORMATION MANAGEMENT | Facility: CLINIC | Age: 69
End: 2024-10-08
Payer: COMMERCIAL

## 2024-10-30 ENCOUNTER — ALLIED HEALTH/NURSE VISIT (OUTPATIENT)
Dept: FAMILY MEDICINE | Facility: CLINIC | Age: 69
End: 2024-10-30
Payer: COMMERCIAL

## 2024-10-30 DIAGNOSIS — E53.8 VITAMIN B12 DEFICIENCY (NON ANEMIC): Primary | ICD-10-CM

## 2024-10-30 PROCEDURE — G0008 ADMIN INFLUENZA VIRUS VAC: HCPCS

## 2024-10-30 PROCEDURE — 90662 IIV NO PRSV INCREASED AG IM: CPT

## 2024-10-30 RX ADMIN — CYANOCOBALAMIN 1000 MCG: 1000 INJECTION, SOLUTION INTRAMUSCULAR; SUBCUTANEOUS at 11:15

## 2024-11-27 ENCOUNTER — OFFICE VISIT (OUTPATIENT)
Dept: HEMATOLOGY | Facility: CLINIC | Age: 69
End: 2024-11-27
Attending: PHYSICIAN ASSISTANT
Payer: COMMERCIAL

## 2024-11-27 ENCOUNTER — ALLIED HEALTH/NURSE VISIT (OUTPATIENT)
Dept: FAMILY MEDICINE | Facility: CLINIC | Age: 69
End: 2024-11-27
Payer: COMMERCIAL

## 2024-11-27 VITALS
SYSTOLIC BLOOD PRESSURE: 108 MMHG | RESPIRATION RATE: 12 BRPM | HEIGHT: 61 IN | DIASTOLIC BLOOD PRESSURE: 57 MMHG | OXYGEN SATURATION: 97 % | TEMPERATURE: 98.7 F | HEART RATE: 80 BPM | BODY MASS INDEX: 35.68 KG/M2 | WEIGHT: 189 LBS

## 2024-11-27 DIAGNOSIS — Z98.84 GASTRIC BYPASS STATUS FOR OBESITY: ICD-10-CM

## 2024-11-27 DIAGNOSIS — Z23 ENCOUNTER FOR IMMUNIZATION: Primary | ICD-10-CM

## 2024-11-27 DIAGNOSIS — Z86.711 HISTORY OF PULMONARY EMBOLISM: Primary | ICD-10-CM

## 2024-11-27 DIAGNOSIS — I26.09 ACUTE PULMONARY EMBOLISM WITH ACUTE COR PULMONALE, UNSPECIFIED PULMONARY EMBOLISM TYPE (H): ICD-10-CM

## 2024-11-27 DIAGNOSIS — Z71.89 ENCOUNTER FOR ANTICOAGULATION DISCUSSION AND COUNSELING: ICD-10-CM

## 2024-11-27 DIAGNOSIS — Z86.718 PERSONAL HISTORY OF DVT (DEEP VEIN THROMBOSIS): ICD-10-CM

## 2024-11-27 DIAGNOSIS — Z79.01 LONG TERM CURRENT USE OF ANTICOAGULANT THERAPY: ICD-10-CM

## 2024-11-27 PROCEDURE — 99207 PR NO CHARGE NURSE ONLY: CPT

## 2024-11-27 PROCEDURE — G0463 HOSPITAL OUTPT CLINIC VISIT: HCPCS | Performed by: PHYSICIAN ASSISTANT

## 2024-11-27 RX ADMIN — CYANOCOBALAMIN 1000 MCG: 1000 INJECTION, SOLUTION INTRAMUSCULAR; SUBCUTANEOUS at 10:50

## 2024-11-27 NOTE — PROGRESS NOTES
Prior to immunization administration, verified patients identity using patient s name and date of birth. Please see Immunization Activity for additional information.     Is the patient's temperature normal (100.5 or less)? Yes     Patient MEETS CRITERIA. PROCEED with vaccine administration.      Patient instructed to remain in clinic for 15 minutes afterwards, and to report any adverse reactions.      Link to Ancillary Visit Immunization Standing Orders SmartSet     Screening performed by Selvin Britt MA on 11/27/2024 at 10:50 AM.

## 2024-11-27 NOTE — PROGRESS NOTES
[facility]  [address] [place] [address], [place] [address]  Main: [phone], Fax: [phone]    Patient seen at: [facility] at [address] [place] [address]    Outpatient Visit Note:    Patient: Dandy Mejia  MRN: [id number]  : 1955  TC: 2024    Reason for visit:  Follow up, history of venous thromboembolism     Clinical History Summary:  Dandy Mejia is a 69 year old female with past medical history significant for obesity s/p gastric bypass, iron deficiency anemia, B12 deficiency, PENNIE, superficial thrombophlebitis and remote history of unprovoked distal lower extremity DVT who was evaluated in the ED  2023 for evaluation of dyspnea and fatigue and she was found to have bilateral pulmonary emboli. Her initial DVT episode many years ago was felt to be unprovoked. Her most recent episode was likely at least partially provoked by acute illness and associated bedrest as well as metabolic derangement and dehydration. She does not have a family history of venous thromboembolism. She does have additional ongoing risk factors of obesity, advancing age, and varicosities. Given recurrent events with at least one likely unprovoked event, she was recommended to consider long term anticoagulation.      At her 2023 visit, I did  her that Eliquis is the preferred DOAC in patients who have had gastric alteration from weight loss surgeries. A level was obtained and low thus she was changed to Eliquis from Xarelto. An Eliquis level was obtained and showed it was therapeutic.     She underwent right TKA on 2024. She tolerated the procedure well.     Interim History:  Today, Dandy notes that she is doing well. She notes no new venous thromboembolism concerns or ongoing symptoms. She denies any bleeding issues. She did have minor cut of her finger but did not have any excessive bleeding. She did not require intervention.  She tolerated her TKA well and has been recovering great. She has been walking and doing therapy exercises. No falls.      She does inquire about how well her anticoagulant is working for her. Her prior peak level was in goal range.     She notes no ongoing dyspnea symptoms. No ongoing lower extremity swelling or pain and her prior US showed no DVT presence.    ROS:  Denies any bleeding complications. Specifically, no frequent epistaxis. No issues with oral mucosal bleeding. Denies any hematuria or blood in stools. Denies any shortness of breath. No chest pain. No cough. No fever.      Medications:   Current Outpatient Medications   Medication Sig Dispense Refill    apixaban ANTICOAGULANT (ELIQUIS ANTICOAGULANT) 5 MG tablet Take 1 tablet (5 mg) by mouth 2 times daily 180 tablet 3    Calcium Carbonate-Vit D-Min (CALCIUM 1200 PO) Take 1 tablet by mouth 2 times daily      cholecalciferol, vitamin D3, 5,000 unit Tab [CHOLECALCIFEROL, VITAMIN D3, 5,000 UNIT TAB] Take 1 tablet by mouth daily.       famotidine (PEPCID) 20 MG tablet Take 1 tablet (20 mg) by mouth as needed (heartburn)      ferrous sulfate 325 (65 FE) MG tablet [FERROUS SULFATE 325 (65 FE) MG TABLET] Take 325 mg by mouth.      furosemide (LASIX) 40 MG tablet Take 1 tablet (40 mg) by mouth as needed (leeg edema) 30 tablet 1    hydrochlorothiazide (HYDRODIURIL) 25 MG tablet TAKE ONE TABLET BY MOUTH EVERY DAY 90 tablet 2    Probiotic Product (DAILY DIGESTIVE PROBIOTIC PO) Take 1 capsule by mouth daily      rosuvastatin (CRESTOR) 20 MG tablet Take 1 tablet (20 mg) by mouth daily Please call PCP for refills 90 tablet 2    triamcinolone (KENALOG) 0.1 % cream [TRIAMCINOLONE (KENALOG) 0.1 % CREAM] Apply to affected areas 3 times a day for 2 weeks 45 g 1    losartan (COZAAR) 25 MG tablet Take 1 tablet (25 mg) by mouth daily (Patient not taking: Reported on 11/27/2024)          Allergies:    No Known Allergies    PMH:  Past Medical History:   Diagnosis Date    Benign  "essential hypertension     DVT (deep venous thrombosis) (H)     Dyslipidemia     Gastric bypass status for obesity     Pulmonary embolism (H)         Social History:   Social History     Tobacco Use    Smoking status: Never     Passive exposure: Past    Smokeless tobacco: Never   Vaping Use    Vaping status: Never Used   Substance Use Topics    Alcohol use: Yes     Alcohol/week: 7.0 standard drinks of alcohol     Types: 7 Standard drinks or equivalent per week    Drug use: No       Family History:  Deferred.    Objective:  Vitals: /57 (BP Location: Right arm, Patient Position: Right side, Cuff Size: Adult Large)   Pulse 80   Temp 98.7  F (37.1  C) (Oral)   Resp 12   Ht 1.549 m (5' 1\")   Wt 85.7 kg (189 lb)   LMP  (LMP Unknown)   SpO2 97%   BMI 35.71 kg/m       Exam:   Constitutional: Appears well, no distress  HEENT: Pupils equal and round. No scleral icterus or hemorrhage.   Respiratory: no increased work of breathing.   Mus/Skele: no edema of lower extremities  Skin: no petechiae, no ecchymosis on exposed dermis.  Neuro: CN II-XII intact. Normal gait. AOx3      Labs:  CBC RESULTS:   Recent Labs   Lab Test 08/28/24  1121   WBC 5.3   RBC 3.77*   HGB 11.8   HCT 35.8   MCV 95   MCH 31.3   MCHC 33.0   RDW 12.2        Last Comprehensive Metabolic Panel:  Sodium   Date Value Ref Range Status   08/28/2024 136 135 - 145 mmol/L Final     Potassium   Date Value Ref Range Status   08/28/2024 3.6 3.4 - 5.3 mmol/L Final   10/21/2021   Final     Comment:     Specimen hemolyzed, result invalid     Chloride   Date Value Ref Range Status   08/28/2024 97 (L) 98 - 107 mmol/L Final   10/21/2021 97 (L) 98 - 107 mmol/L Final     Carbon Dioxide (CO2)   Date Value Ref Range Status   08/28/2024 28 22 - 29 mmol/L Final   10/21/2021 27 22 - 31 mmol/L Final     Anion Gap   Date Value Ref Range Status   08/28/2024 11 7 - 15 mmol/L Final   10/21/2021 10 5 - 18 mmol/L Final     Glucose   Date Value Ref Range Status "   08/28/2024 107 (H) 70 - 99 mg/dL Final   10/21/2021 105 70 - 125 mg/dL Final     Urea Nitrogen   Date Value Ref Range Status   08/28/2024 9.5 8.0 - 23.0 mg/dL Final   10/21/2021 8 8 - 22 mg/dL Final     Creatinine   Date Value Ref Range Status   08/28/2024 0.66 0.51 - 0.95 mg/dL Final     GFR Estimate   Date Value Ref Range Status   08/28/2024 >90 >60 mL/min/1.73m2 Final     Comment:     eGFR calculated using 2021 CKD-EPI equation.   08/20/2020 >60 >60 mL/min/1.73m2 Final     GFR, ESTIMATED POCT   Date Value Ref Range Status   03/21/2024 >60 >60 mL/min/1.73m2 Final     Calcium   Date Value Ref Range Status   08/28/2024 9.0 8.8 - 10.4 mg/dL Final     Comment:     Reference intervals for this test were updated on 7/16/2024 to reflect our healthy population more accurately. There may be differences in the flagging of prior results with similar values performed with this method. Those prior results can be interpreted in the context of the updated reference intervals.     Liver Function Studies -   Recent Labs   Lab Test 08/28/24  1120   PROTTOTAL 5.6*   ALBUMIN 3.6   BILITOTAL 0.3   ALKPHOS 44   AST 20   ALT 15      Apixaban level - in goal range 193 ( nanogram/mL   for 5 mg twice daily for venous thromboembolism treatment.)    Imaging:  No results found for this or any previous visit (from the past 744 hours).     Assessment:   Dandy Meija is a 69 year old female with history of:     Unprovoked distal lower extremity DVT, remote history   Minimally provoked pulmonary embolism 12/2023   In setting of acute illness, bedrest, metabolic derangement, dehydration  Was on Xarelto - poor absorption in setting of gastric bypass thus changed to Eliquis in 1/2024.  History of superficial thrombophlebitis   S/P gastric bypass   MONA, B12 deficiency   PENNIE   Coronary artery disease        Plan:  Dandy is an appropriate candidate to stay on long term anticoagulation given that she has had recurrent venous thromboembolism  events and has family history of thrombosis. She also has ongoing risk factors that put her at increased risk of recurrent events. We discuss reduction in anticoagulation to prophylactic anticoagulation however she is tolerating therapeutic dose without any concerns thus will continue at current dose for now. There is no indication for repeat imaging at present as she is asymptomatic and outcome is not going to  plan.     Patient instructed to contact the clinic should they require any surgical procedures for perioperative planning.     Return to clinic in 1 year, sooner if any concerns. She desires telephone/virtual visit next time which is reasonable due to commute.       Jyoti Figueroa, MPH, PA-C  [facility]    30 minutes spent by me on the date of the encounter doing chart review, review of outside records, review of test results, interpretation of tests, patient visit, and documentation

## 2024-11-27 NOTE — PATIENT INSTRUCTIONS
[facility]  [facility]  [facility] [address], [address], [place] [address]  Main: [phone], Fax: [phone]    Dandy,   It was a pleasure seeing you today.  Thank you for allowing us to be involved in your care.  Please let us know if there is anything else we can do for you, so that we can be sure you are leaving completely satisfied with your care experience. Below is the plan that we discussed.     Continue Eliquis 5mg twice daily   If cost too high, consider [facility] option.   Call if any surgeries, procedures planned or if you experience any new concerning symptoms or bleeding issues.       We would like a provider on our team to see you at least annually for optimal care and to allow us to continue to prescribe for you.        Return in  in one year. For VIRTUAL VISIT!     If you have questions or concerns, please don't hesitate to send a [employer] Message for non urgent matters or contact my nurse clinician, Subha. Her number is [phone]. If they are unavailable and you have immediate concerns, please call [phone] and ask for a nurse.     Jyoti Figueroa, MPH, PA-C  [facility]

## 2024-12-22 NOTE — PATIENT INSTRUCTIONS - HE
Add losartan 25 mg once a day.    Take that with your hydrochlorothiazide, but reduce the hydrochlorothiazide down to 12.5 mg a day by cutting pills in half.    Follow-up with nurse practitioner, Avery Crowley in approximate 3 weeks.  Do not come fasting for that visit, you will be having your potassium and kidney labs checked at that visit.    Goal blood pressure 120/70 to 130/80.    If you have significant increase in your leg swelling, or blood pressures that are running higher than 130/80, you can return to the higher dose of 25 mg of hydrochlorothiazide.    Follow-up with me in 2 to 3 months for blood pressure checkup.    Increase regular walking as much as able.           INTERNAL MEDICINE PROGRESS NOTE  TEACHING SERVICE - BLUE TEAM    Subjective   Wayne Burkitt is a 75 y.o. male CAD, CABG, hypertension, ICM, exertional angina, vascular dementia, s/p 16 stents, s/p 3 R inguinal hernia repairs, and seizures admitted on 12/20/2024  8:05 AM for left heart catheterization.    HPI:  Patient seen and examined this evening at bedside. He states that he has had some mild LLQ pain but thinks it may be due to gas. No other complaints at this time. He denies lightheadedness, weakness, headaches, shortness of breath, chest pain, leg swelling, fever, chills, n/v/d. He is eating and drinking well.    Review of Systems:  Review of Systems     Objective   Vitals:  Most Recent:  Vitals:    12/22/24 1100   BP:    Pulse: 70   Resp: 13   Temp:    SpO2:        24hr Min/Max:  Temp  Min: 36.4 °C (97.5 °F)  Max: 36.6 °C (97.9 °F)  Pulse  Min: 60  Max: 96  BP  Min: 71/44  Max: 145/70  Resp  Min: 10  Max: 34  SpO2  Min: 97 %  Max: 100 %    Intake/Output:    Intake/Output Summary (Last 24 hours) at 12/22/2024 1109  Last data filed at 12/22/2024 1000  Gross per 24 hour   Intake 840 ml   Output 1175 ml   Net -335 ml       Physical exam:    Physical Exam  Constitutional:       General: He is not in acute distress.     Appearance: Normal appearance.   HENT:      Head: Normocephalic and atraumatic.   Cardiovascular:      Rate and Rhythm: Normal rate and regular rhythm.      Heart sounds: No murmur heard.  Pulmonary:      Effort: Pulmonary effort is normal. No respiratory distress.      Breath sounds: Normal breath sounds. No wheezing.   Abdominal:      General: Abdomen is flat. There is no distension.      Palpations: Abdomen is soft.      Tenderness: There is no abdominal tenderness.   Musculoskeletal:      Right lower leg: No edema.      Left lower leg: No edema.   Skin:     General: Skin is warm and dry.      Comments: Pale   Neurological:      Mental Status: He is alert.          Lab/Radiology/Diagnostic  Review:  Results for orders placed or performed during the hospital encounter of 12/20/24 (from the past 24 hours)   POCT GLUCOSE   Result Value Ref Range    POCT Glucose 116 (H) 74 - 99 mg/dL   CBC   Result Value Ref Range    WBC 7.9 4.4 - 11.3 x10*3/uL    nRBC 0.0 0.0 - 0.0 /100 WBCs    RBC 2.08 (L) 4.50 - 5.90 x10*6/uL    Hemoglobin 6.7 (L) 13.5 - 17.5 g/dL    Hematocrit 22.4 (L) 41.0 - 52.0 %     (H) 80 - 100 fL    MCH 32.2 26.0 - 34.0 pg    MCHC 29.9 (L) 32.0 - 36.0 g/dL    RDW 14.0 11.5 - 14.5 %    Platelets 297 150 - 450 x10*3/uL   POCT GLUCOSE   Result Value Ref Range    POCT Glucose 120 (H) 74 - 99 mg/dL   CBC   Result Value Ref Range    WBC 8.1 4.4 - 11.3 x10*3/uL    nRBC 0.0 0.0 - 0.0 /100 WBCs    RBC 2.00 (L) 4.50 - 5.90 x10*6/uL    Hemoglobin 6.6 (L) 13.5 - 17.5 g/dL    Hematocrit 21.9 (L) 41.0 - 52.0 %     (H) 80 - 100 fL    MCH 33.0 26.0 - 34.0 pg    MCHC 30.1 (L) 32.0 - 36.0 g/dL    RDW 14.1 11.5 - 14.5 %    Platelets 269 150 - 450 x10*3/uL   POCT GLUCOSE   Result Value Ref Range    POCT Glucose 112 (H) 74 - 99 mg/dL   CBC   Result Value Ref Range    WBC 6.5 4.4 - 11.3 x10*3/uL    nRBC 0.0 0.0 - 0.0 /100 WBCs    RBC 1.85 (L) 4.50 - 5.90 x10*6/uL    Hemoglobin 6.0 (LL) 13.5 - 17.5 g/dL    Hematocrit 20.1 (L) 41.0 - 52.0 %     (H) 80 - 100 fL    MCH 32.4 26.0 - 34.0 pg    MCHC 29.9 (L) 32.0 - 36.0 g/dL    RDW 13.9 11.5 - 14.5 %    Platelets 217 150 - 450 x10*3/uL   Magnesium   Result Value Ref Range    Magnesium 1.81 1.60 - 2.40 mg/dL   Renal Function Panel   Result Value Ref Range    Glucose 124 (H) 74 - 99 mg/dL    Sodium 140 136 - 145 mmol/L    Potassium 4.1 3.5 - 5.3 mmol/L    Chloride 108 (H) 98 - 107 mmol/L    Bicarbonate 26 21 - 32 mmol/L    Anion Gap 10 10 - 20 mmol/L    Urea Nitrogen 25 (H) 6 - 23 mg/dL    Creatinine 1.01 0.50 - 1.30 mg/dL    eGFR 77 >60 mL/min/1.73m*2    Calcium 8.2 (L) 8.6 - 10.3 mg/dL    Phosphorus 2.6 2.5 - 4.9 mg/dL    Albumin 3.4 3.4 - 5.0 g/dL    POCT GLUCOSE   Result Value Ref Range    POCT Glucose 103 (H) 74 - 99 mg/dL     US scrotum    Result Date: 12/21/2024  Interpreted By:  Kendall Bernabe, STUDY: US SCROTUM;  12/21/2024 11:45 am   INDICATION: Signs/Symptoms:Scrotal hematoma measuring 18 cm in diameter..     COMPARISON: CT abdomen pelvis 12/20/2024   ACCESSION NUMBER(S): [id number]   ORDERING CLINICIAN: MICHELLE CUENCA   TECHNIQUE: Multiple ultrasonographic images of scrotum and tested were obtained.   FINDINGS: RIGHT HEMISCROTUM:   RIGHT TESTICLE: The right testicle measures 1.4 cm x 1.5 cmx 2.5 cm. The right testicle demonstrates a homogeneous echotexture and normal contour. Normal vascularity and Doppler waveforms are observed in the right testicle. There is significant scrotal wall thickening and mild measuring up to 1.4 cm on the right.   RIGHT EPIDIDYMIS: The right epididymal head measures 0.6 cm x 0.5 cm x 0.7 cm. Tiny epididymal calcifications measuring up to 4 mm.   LEFT HEMISCROTUM:   LEFT TESTICLE: The left testicle measures 2.0 cm x 2.3 cm x 3.6 cm. The left testicle demonstrates a homogeneous echotexture and normal contour. Normal vascularity and Doppler waveforms are observed in the left testicle. Significant scrotal wall thickening and edema measuring up to 1.5 cm on the left.   LEFT EPIDIDYMIS: The left epididymal head measures 0.7 cm x 0.8 cm x 0.9 cm and is within normal limits.       Significant bilateral scrotal wall edema and skin thickening.   No evidence of testicular torsion bilaterally.   MACRO: None   Signed by: Kendall Bernabe 12/21/2024 4:38 PM Dictation workstation:   [id number]    ECG 12 Lead    Result Date: 12/21/2024  AV dual-paced rhythm with prolonged AV conduction Abnormal ECG When compared with ECG of 11-SEP-2003 13:58, Electronic ventricular pacemaker has replaced Sinus rhythm    Vascular US lower extremity arterial duplex right    Result Date: 12/21/2024            [facility] [address] [place] [address]     Tel [phone] Fax [phone]  Vascular Lab Report  [facility] US LOWER EXTREMITY ARTERIAL DUPLEX RIGHT Patient Name:      WAYNE BURKITT         Dianelys Physician:  [id number] Sabina Peterson MD, RPVI Study Date:        12/21/2024            Ordering Provider:  [id number] MICHELLE CUENCA MRN/PID:           [id number]              Fellow: Accession#:        [id number]          Technologist:       Fazal LÓPEZ,                                                              RVT Date of Birth/Age: 1949 / 75 years Technologist 2: Gender:            M                     Encounter#:         [id number] Admission Status:  Inpatient             Location Performed: [facility]  Diagnosis/ICD: 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 CHF.  CONCLUSIONS:  Right Lower Arterial: There is >50% stenosis documented at the external iliac artery. No evidence of hemodynamically significant stenosis found in the right lower extremity. Velocity shift noted at the EIA just meets criteria for > 50% stenosis. Remainder of arteries in the right leg are patent. There is a small right groin hematoma ~ 0.9 x 0.7 x 1.1 cm. Pseudo Aneurysm: No evidence of pseudo aneurysm noted in the right groin.  Right Pop Aneurysm: There is no evidence of aneurysm noted in the right lower extremity.  Imaging & Doppler Findings:  Right                     Left   PSV                       cm/s         cm/s        CFA 61 cm/s  Profunda Proximal 106 cm/s   SFA Proximal 108 cm/s      SFA Mid 71 cm/s     SFA Distal 59 cm/s      Popliteal 55 cm/s    JAELYN Proximal 27 cm/s       JAEYLN Mid 27 cm/s      JAELYN Distal 66 cm/s  Peroneal Proximal 93 cm/s    Peroneal Mid 60 cm/s   Peroneal Distal 125 cm/s   PTA Proximal 99 cm/s       PTA Mid 93 cm/s     PTA Distal  [id number] Sabina Peterson MD, RPVI Electronically signed by [id number] Sabina Peterson MD, RPVI on 12/21/2024 at 11:04:39 AM  ** Final **     Cardiac Catheterization Procedure    Result Date: 12/21/2024   [facility] [place] [facility]      [id number] [address] Cardiovascular Catheterization Report Patient Name:      WAYNE BURKITT         Performing Physician:  Lina Ramey MD Study Date:        12/20/2024            Verifying Physician:   Lina Ramey MD MRN/PID:           [id number]              Cardiologist/Co-Scrub: Accession#:        [id number]          Ordering Provider:     Lina RAMEY Date of Birth/Age: 1949 / 75 years Cardiologist: Gender:            M                     Fellow: Encounter#:        [id number]            Surgeon:  Study:            Left Heart Cath with Grafts Additional Study: PCI - Percutaneous Coronary Intervention  Indications: WAYNE BURKITT is a 75 year old male who presents with hypertension, prior coronary artery bypass graft surgery, dyslipidemia and a chest pain assessment of typical angina. Stable angina. Stress test performed: No. CTA performed: NoTamika Doan accessed: No. LVEF Assessed: No. Cardiac arrest: No. Cardiac surgical consult: No. Cardiovascular Instability: No Frailty status of patient entering lab: 6 = Moderately frail.  Procedure Description: After infiltration with 2% Lidocaine, the right femoral artery was cannulated with a  modified Seldinger technique. Subsequently a 5 Turkmen sheath was placed retrograde in the right femoral artery. The arterial sheath was sized up to 6 Turkmen. Selective coronary catheterization was performed using a 5 Fr catheter(s) exchanged over a guide wire to cannulate the coronary arteries. A 4 tip catheter was used for left coronary injections. Additional catheter(s) used to visualize the coronary arteries were: 3DRC. Multiple injections of contrast were made into the left and right coronary arteries with angiograms recorded in multiple projections. Retrograde left heart catheterizion was accomplished with a 5 Fr. pigtail catheter. A single plane left ventriculogram was recorded in the 30 degree DUKE projection. The contrast dose was 10 ml injected at 5 ml/sec. The catheter was then withdrawn across the aortic valve under continuous pressure monitoring and removed.  Coronary Angiography: The coronary circulation is right dominant.  Left Main Coronary Artery: The left main artery gives rise to the LAD and Circumflex. The left main coronary artery mildly diseased.  Left Anterior Descending Coronary Artery Distribution: The Left Anterior Descending artery is a large vessel. There is 100% stenosis in the mid left anterior descending artery. Hemodynamically significant obstruction is noted in this vessel and contains patent previously placed stents. There is 50-70%stenosis in the 1st Diag left anterior descending artery.  Circumflex Coronary Artery Distribution: The Left Circumflex artery is a large vessel. Hemodynamically significant obstruction is noted in this vessel and contains patent previously placed stents. There is 100% stenosis in the the mid Circumflex artery.  Right Coronary Artery Distribution: Hemodynamically significant obstruction is noted in this vessel and contains patent previously placed stents. There is 90% stenosis in the the mid Right Coronary Artery. The devices advanced to the the mid RCA  lesion were: a balloon was inflated for pre-dilation, Resolute [place] 2.5x26 stent was deployed in the lesion a balloon was inflated for post-dilation. Residual stenosis is <10%. PDA right coronary artery showed 100% stenosis. This segment is collaterals.  Coronary Grafts:  LIMA Graft: There is 0% stenosis noted in the left internal mammary graft to the Mid LAD. Saphaneous Vein Graft(s): There is 70% stenosis in the saphenous vein graft to the 2nd Marginal.  Left Ventriculography: The estimated left ventricular ejection fraction is normal at 55%. The left venticular end diastolic pressure measures 15 mm Hg. There is no end diastolic pressure dysfunction of the left ventricle. There is no regional wall motion abnormalities noted. There is normal left ventricular systolic function noted. The left ventricle is normal in size. There is no aortic stenosis noted.  Hemo Personnel: +-----------------------+---------+ Name                   Duty      +-----------------------+---------+ Gabino Giron MD 1 +-----------------------+---------+  Hemodynamic Pressures:  +----+---------------+----------+-------------+--------------+-------+---------+ Site   Date Time   Phase NameSystolic mmHgDiastolic mmHgED mmHgMean mmHg +----+---------------+----------+-------------+--------------+-------+---------+   AO     12/20/2024  AIR REST          139            58              87         10:53:17 AM                                                      +----+---------------+----------+-------------+--------------+-------+---------+   AO     12/20/2024  AIR REST          157            66             104         10:57:05 AM                                                      +----+---------------+----------+-------------+--------------+-------+---------+   AO     12/20/2024  AIR REST          162            56             100         11:03:46 AM                                                       +----+---------------+----------+-------------+--------------+-------+---------+   LV     12/20/2024  AIR REST          141             2     16                  11:07:05 AM                                                      +----+---------------+----------+-------------+--------------+-------+---------+   LV     12/20/2024  AIR REST          138             2     15                  11:07:14 AM                                                      +----+---------------+----------+-------------+--------------+-------+---------+  LVp     12/20/2024  AIR REST          143             1     16                  11:07:55 AM                                                      +----+---------------+----------+-------------+--------------+-------+---------+  AOp     12/20/2024  AIR REST          134            53              85         11:08:02 AM                                                      +----+---------------+----------+-------------+--------------+-------+---------+   AO     12/20/2024  AIR REST          120            50              77         11:12:51 AM                                                      +----+---------------+----------+-------------+--------------+-------+---------+   AO     12/20/2024  AIR REST          125            56              83         11:18:22 AM                                                      +----+---------------+----------+-------------+--------------+-------+---------+   AO     12/20/2024  AIR REST           92            44              63         11:21:00 AM                                                      +----+---------------+----------+-------------+--------------+-------+---------+   AO     12/20/2024  AIR REST          128            58              85         11:21:55 AM                                                       +----+---------------+----------+-------------+--------------+-------+---------+   AO     12/20/2024  AIR REST          122            54              80         11:27:15 AM                                                      +----+---------------+----------+-------------+--------------+-------+---------+  Cardiac Cath Post Procedure Notes: Post Procedure           Triple vessel disease. Diagnosis: Blood Loss:              Estimated blood loss during the procedure was trivial                          mls. Specimens Removed:       Number of specimen(s) removed: none. ____________________________________________________________________________________ CONCLUSIONS:  1. Left Main Coronary Artery: This artery is mildly diseased.  2. Left Anterior Descending Artery: is significantly obstructed and contains patent previously placed stents.  3. Mid LAD Lesion: The percent stenosis is 100%.  4. 1st Diag LAD Lesion: The percent stenosis is 50-70%.  5. Circumflex Coronary Artery: significantly obstructed and contains patent previously placed stents.  6. Mid CX Lesion: The percent stenosis is 100%.  7. Right Coronary Artery: significantly obstructed and contains patent previously placed stents.  8. Mid RCA Lesion: The percent stenosis is 90%.  9. Mid RCA Lesion: pre-dilation, Resolute [place] 2.5x26 post-dilation: <10% residual stenosis. RCA: pre-procedure XENA flow was 3(complete perfusion), post-procedure XENA flow was 3(complete perfusion) and the lesion risk category Non-high/Non-C. 10. PDA RCA Lesion: The percent stenosis is 100%. 11. LIMA to the Mid LAD: Percent stenosis is 0%. 12. SVG to the 2nd Marginal: Percent stenosis is 70%. 13. The Left Ventricular Ejection Fraction is 55%. 14. Left Ventricular End Diastolic Pressure: 15 mm Hg. 15. Aortic Stenosis: None. 16. Left Ventricular End Diastolic Pressure Dysfunction: None. 17. LV: No RWMA. 18. LV: normal left ventricular systolic  function. ICD 10 Codes: Angina pectoris, unspecified-I20.9; Atherosclerosis of autologous artery coronary artery bypass graft(s) with other forms of angina pectoris-I25.728  CPT Codes: Left Heart Cath Bypass Graft w ventriculography and coronary angio(LHC)-08919; Stent w angio ather Right Coronary addl branch major Artery (PCI)-03038.  [id number] Gabino Giron MD Performing Physician Electronically signed by [id number] Gabino Giron MD on 12/21/2024 at 9:24:30 AM  ** Final **     CT abdomen pelvis w IV contrast    Result Date: 12/21/2024  Interpreted By:  Jw Bonilla, STUDY: CT ABDOMEN PELVIS W IV CONTRAST;  12/20/2024 8:52 pm   INDICATION: Signs/Symptoms:S/p left heart catheterization complicated by scrotal hematoma, rule out retroperitoneal hematoma..     COMPARISON: None.   ACCESSION NUMBER(S): [id number]   ORDERING CLINICIAN: MICHELLE CUENCA   TECHNIQUE: Contiguous axial images of the abdomen and pelvis were obtained after the intravenous administration of 75 mL Omnipaque 350 contrast. Coronal and sagittal reformatted images were reconstructed from the axial data.   FINDINGS: LOWER CHEST: No acute abnormality.   LIVER: Hepatic steatosis.   BILE DUCTS: No significant intrahepatic or extrahepatic dilatation.   GALLBLADDER: Cholelithiasis without CT evidence of acute cholecystitis.   PANCREAS: No significant abnormality.   SPLEEN: No significant abnormality.   ADRENALS: No significant abnormality.   KIDNEYS, URETERS, BLADDER: Excreted contrast limits assessment for small nonobstructing calculi. No obstructing calculi identified. No hydronephrosis or hydroureter. Urinary bladder is partially distended with excreted contrast material. A Strange catheter is in place within the bladder. Nondependent air in the bladder likely related to catheter placement.   REPRODUCTIVE ORGANS: Prostate is partially obscured by streak artifact from right hip arthroplasty. Prostatomegaly measuring at least 6.7 cm. Correlate with  PSA. Extensive edema and fluid noted within the scrotum compatible with reported scrotal hematoma. Stranding with lesser fluid and edema noted along the bilateral inguinal canals.   GI: No obstruction. No bowel wall thickening or adjacent inflammatory change. Normal appendix.   VESSELS: No aortic aneurysm. Mild aortic calcifications. Metallic streak artifact from right hip prosthesis limits assessment of the femoral artery in the region of the access site. The portal veins and IVC are patent.   PERITONEUM/RETROPERITONEUM: No intraperitoneal free air or fluid. No definite retroperitoneal hematoma. Scrotal edema and edema along the bilateral inguinal canals as described above.   LYMPH NODES: No enlarged lymph nodes.   ABDOMINAL WALL: Subcutaneous fat stranding within the right inguinal region likely related to access site.   OSSEOUS STRUCTURES: Right total hip arthroplasty. No apparent hardware complication. No acute osseous abnormality identified.       1. Extensive scrotal edema and fluid within the scrotum consistent with reported scrotal hematoma. 2. There is mild fluid and fat stranding noted along the bilateral inguinal canals, right-greater-than-left. No appreciable retroperitoneal hematoma. 3. Subcutaneous fat stranding along the right inguinal region likely related to access site. 4. If there is concern for testicular compromise then scrotal ultrasound is recommended for further assessment.   MACRO: None   Signed by: Jw Bonilla 12/21/2024 2:27 AM Dictation workstation:   [id number]    Cardiology Interpretation Of Nuclear Stress - See Other Report For Nuclear Portion    Result Date: 12/11/2024            [facility] [address] [address] [place] [address]     Tel [phone] Fax [phone] Nuclear Pharmacologic Stress Test Patient Name:      WAYNE BURKITT       Ordering Provider:     [id number] BRANDIN RAMEY Study Date:         12/5/2024           Reading Physician:     [id number] Gabino Giron MD MRN/PID:           [id number]            Supervising Physician: [id number] Gabino Giron MD Accession#:        [id number]        Fellow: Date of Birth/Age: 1949 / 75     Fellow:                    years Gender:            M                   Nurse:                 Anni Louis RN Admit Date:        12/5/2024           Technician:            Kenia Singh                                                               CVT Admission Status:  Outpatient          Sonographer:           N/A Height:            180.30 cm           Technologist: Weight:            78.00 kg            Additional Staff: BSA:               1.98 m2             Encounter#:            [id number] BMI:               23.99 kg/m2         Patient Location:      [facility] Stress Lab Study Type:    CARDIOLOGY INTERPRETATION OF NUCLEAR STRESS Diagnosis/ICD: Atherosclerotic heart disease-I25.10; Unstable angina-I20.0 Indication:    Angina Unstable and H/O CABG/ISCHEMIC CARDIOMYOPATHY CPT Codes:     Stress Test Interpretation-90871; Stress Test Supervision-60240 Falls Risk: Moderate: Patient has moderate risk for sustaining a fall; a falls prevention plan has been implemented.  Study Details: Correct procedure and correct patient verified verbally.  Patient History: Allergies: Mx allergies; reviewed.  Patient Performance: Patient received a total of 0.4 mg of Regadenoson at 9:32:35 AM. Patient received a total of 36 mCi of Myoview at 9:32:56 AM. The patient did not exercise during infusion. The peak heart rate achieved was 88 bpm, which was 62 % of the age predicted target heart rate of 143 bpm. The resting blood pressure was 158/64 mmHg with a heart rate of 62 bpm. The patient developed STOMACH UPSET during the stress exam. The symptoms resolved with rest 3  minutes into recovery. The blood pressure response was normal. The test was terminated due to: completed lab protocol.  Baseline ECG: Resting ECG showed normal sinus rhythm with normal tracing.  Stress ECG: Stress ECG showed normal sinus rhythm, with OCC PVC'S and no abnormal findings. Since this was a pharmacologic stress test, functional capacity could not be assessed.  Stress Stage Data: +----------------+--+------+-------+----------------------------------+                 HRSys BPDias BPComments                           +----------------+--+------+-------+----------------------------------+ Baseline [id number]   64                                        +----------------+--+------+-------+----------------------------------+ Stage I         95369   60     NO CHEST PAIN. C/O STOMACHE UPSET. +----------------+--+------+-------+----------------------------------+  Recovery ECG: Recovery ECG showed normal sinus rhythm. The heart rate recovery was normal.  +------------+--+------+-------+-------------------------+             HRSys BPDias BPComments                  +------------+--+------+-------+-------------------------+ Recovery I  [id number]   60     STOMACHE UPSET IMPROVING. +------------+--+------+-------+-------------------------+ Recovery II [id number]   66     NO CHEST PAIN.            +------------+--+------+-------+-------------------------+ Recovery [id number]   66     SYMPTOMS RESOLVED.        +------------+--+------+-------+-------------------------+  Summary:  1. Adequate level of stress achieved.  2. No clinical or electrocardiographic evidence for ischemia at maximal infusion.  3. Normal Stress Test.  4. Nuclear image results are reported separately. [id number] Gabino Giron MD Electronically signed on 12/11/2024 at 4:50:11 PM   ** Final **     Transthoracic echo (TTE) complete    Result Date: 12/10/2024            [facility] [address],  [place] [id number]    Tel [phone] Fax [phone] TRANSTHORACIC ECHOCARDIOGRAM REPORT Patient Name:       WAYNE BURKITT       Dianelys Physician:    [id number] Brandin Ramey MD Study Date:         12/5/2024           Ordering Provider:    [id number] BRANDIN RAMEY MRN/PID:            [id number]            Fellow: Accession#:         [id number]        Nurse: Date of Birth/Age:  1949 / 75     Sonographer:          Amy chong Gender Assigned at  M                   Additional Staff: Birth: Height:             180.34 cm           Admit Date: Weight:             78.02 kg            Admission Status:     Outpatient BSA / BMI:          1.98 m2 / 23.99     Department Location:  [facility]                     kg/m2 Blood Pressure: 132 /78 mmHg Study Type:    TRANSTHORACIC ECHO (TTE) COMPLETE Diagnosis/ICD: Unstable angina-I20.0; Atherosclerotic heart disease of native                coronary artery without angina pectoris-I25.10 Indication:    CAD, Chest Pain CPT Codes:     Echo Complete w Full Doppler-[id number] Patient History: Pertinent History: Cardiomyopathy, CAD and HTN. DANIELA. Study Detail: The following Echo studies were performed: 2D, M-Mode, Doppler and               color flow.  PHYSICIAN INTERPRETATION: Left Ventricle: Left ventricular ejection fraction is normal, by visual estimate at 55-60%. There are no regional wall motion abnormalities. The left ventricular cavity size is normal. There is mild increased septal and normal posterior left ventricular wall thickness. There is no evidence of left ventricular hypertrophy. Spectral Doppler shows a normal pattern of left ventricular diastolic filling. LV Wall Scoring: All segments are normal. Left Atrium: The left atrium is normal in size. Right Ventricle: The right ventricle is normal in size. There is  normal right ventricular global systolic function. Right Atrium: The right atrium is normal in size. Aortic Valve: The aortic valve is trileaflet. The aortic valve dimensionless index is 0.83. There is no evidence of aortic valve regurgitation. The peak instantaneous gradient of the aortic valve is 9 mmHg. The mean gradient of the aortic valve is 6 mmHg. Mitral Valve: The mitral valve is normal in structure. There is no evidence of mitral valve regurgitation. Tricuspid Valve: The tricuspid valve is structurally normal. No evidence of tricuspid regurgitation. Pulmonic Valve: The pulmonic valve is structurally normal. There is no indication of pulmonic valve regurgitation. Pericardium: No pericardial effusion noted. Aorta: The aortic root is normal.  CONCLUSIONS:  1. Left ventricular ejection fraction is normal, by visual estimate at 55-60%.  2. There is no evidence of left ventricular hypertrophy.  3. There is normal right ventricular global systolic function.  4. No signficant valve disease.  5. Normal estimated PA pressure.  6. Normal diastolic filling pattern. QUANTITATIVE DATA SUMMARY:  2D MEASUREMENTS:           Normal Ranges: LAs:             2.90 cm   (2.7-4.0cm) IVSd:            1.10 cm   (0.6-1.1cm) LVPWd:           1.00 cm   (0.6-1.1cm) LVIDd:           4.20 cm   (3.9-5.9cm) LVIDs:           2.80 cm LV Mass Index:   74.3 g/m2 LV % FS          33.3 %  LA VOLUME:                    Normal Ranges: LA Vol A4C:        50.5 ml    (22+/-6mL/m2) LA Vol A2C:        44.0 ml LA Vol BP:         48.8 ml LA Vol Index A4C:  25.6ml/m2 LA Vol Index A2C:  22.3 ml/m2 LA Vol Index BP:   24.7 ml/m2 LA Area A4C:       17.5 cm2 LA Area A2C:       15.8 cm2 LA Major Axis A4C: 5.2 cm LA Major Axis A2C: 4.8 cm LA Volume Index:   22.7 ml/m2 LA Vol A4C:        44.1 ml LA Vol A2C:        42.9 ml LA Vol Index BSA:  22.0 ml/m2  RA VOLUME BY A/L METHOD:            Normal Ranges: RA Vol A4C:              29.0 ml    (8.3-19.5ml) RA Vol Index  A4C:        14.7 ml/m2 RA Area A4C:             12.3 cm2 RA Major Axis A4C:       4.4 cm  M-MODE MEASUREMENTS:         Normal Ranges: Ao Root:             3.10 cm (2.0-3.7cm) AoV Exc:             1.90 cm (1.5-2.5cm)  AORTA MEASUREMENTS:         Normal Ranges: AoV Exc:            1.90 cm (1.5-2.5cm) Ao Sinus, d:        3.60 cm (2.1-3.5cm) Ao STJ, d:          3.10 cm (1.7-3.4cm) Asc Ao, d:          3.20 cm (2.1-3.4cm)  LV SYSTOLIC FUNCTION BY 2D PLANIMETRY (MOD):                      Normal Ranges: EF-A4C View:    52 % (>=55%) EF-A2C View:    63 % EF-Biplane:     60 % EF-Visual:      58 % LV EF Reported: 58 %  LV DIASTOLIC FUNCTION:            Normal Ranges: MV Peak E:             0.81 m/s   (0.7-1.2 m/s) MV Peak A:             0.76 m/s   (0.42-0.7 m/s) E/A Ratio:             1.07       (1.0-2.2) MV e'                  0.080 m/s  (>8.0) MV lateral e'          0.10 m/s MV medial e'           0.06 m/s E/e' Ratio:            10.19      (<8.0) PulmV Sys Jake:         37.70 cm/s PulmV Adair Jake:        34.70 cm/s PulmV S/D Jake:         1.10 PulmV A Revs Jake:      24.40 cm/s PulmV A Revs Dur:      66.00 msec  MITRAL VALVE:          Normal Ranges: MV DT:        201 msec (150-240msec)  AORTIC VALVE:                     Normal Ranges: AoV Vmax:                1.54 m/s (<=1.7m/s) AoV Peak P.5 mmHg (<20mmHg) AoV Mean P.0 mmHg (1.7-11.5mmHg) LVOT Max Jake:            1.32 m/s (<=1.1m/s) AoV VTI:                 36.20 cm (18-25cm) LVOT VTI:                30.00 cm LVOT Diameter:           1.80 cm  (1.8-2.4cm) AoV Area, VTI:           2.11 cm2 (2.5-5.5cm2) AoV Area,Vmax:           2.18 cm2 (2.5-4.5cm2) AoV Dimensionless Index: 0.83  RIGHT VENTRICLE: RV Basal 3.30 cm RV Mid   3.60 cm RV Major 6.3 cm TAPSE:   20.4 mm RV s'    0.07 m/s  TRICUSPID VALVE/RVSP:          Normal Ranges: Peak TR Velocity:     1.73 m/s Est. RA Pressure:     3 mmHg RV Syst Pressure:     15 mmHg  (< 30mmHg) IVC Diam:              1.40 cm  PULMONIC VALVE:          Normal Ranges: PV Accel Time:  124 msec (>120ms) PV Max Jake:     1.1 m/s  (0.6-0.9m/s) PV Max P.8 mmHg  Pulmonary Veins: PulmV A Revs Dur: 66.00 msec PulmV A Revs Jake: 24.40 cm/s PulmV Adair Jake:   34.70 cm/s PulmV S/D Jake:    1.10 PulmV Sys Jake:    37.70 cm/s  [id number] Gabino Ramey MD Electronically signed on 12/10/2024 at 9:15:29 AM  Wall Scoring  ** Final **     Nuclear Stress Test    Result Date: 2024  Interpreted By:  Armani Boogie and Hanreck James STUDY: NUCLEAR STRESS TEST;  2024 11:37 am   INDICATION: Signs/Symptoms:chest pain.   ,I25.10 Atherosclerotic heart disease of native coronary artery without angina pectoris,I20.0 Unstable angina (Multi)   COMPARISON: None.   ACCESSION NUMBER(S): [id number]   ORDERING CLINICIAN: GABINO RAMEY   TECHNIQUE: DIVISION OF NUCLEAR MEDICINE PHARMACOLOGIC STRESS MYOCARDIAL PERFUSION SCAN, ONE DAY PROTOCOL   The patient received an intravenous injection of 10.5 mCi of Tc-99m Myoview and resting emission tomographic (SPECT) images of the myocardium were acquired. The patient then received an intravenous infusion of 0.4 mg regadenoson (Lexiscan) followed by an additional injection of 36.0 mCi of Tc-99m Myoview. Stress phase SPECT images of the myocardium were then acquired. These included ECG-gated post-stress and rest images to assess and quantify ventricular function.  Low dose CT was acquired for attenuation correction.   FINDINGS: Decreased radiotracer activity throughout the inferior left ventricular wall on non attenuation corrected images which improves with attenuation correction and demonstrates normal wall motion and thickening in this region is consistent with attenuation artifact. Otherwise both stress and rest studies demonstrate grossly normal perfusion throughout the left ventricle.   The left ventricle is normal in size.   EKG-gated images demonstrate normal LV wall motion with a post-stress LV  EF estimated at 64%.   Attenuation correction CT images demonstrate no gross anatomic abnormalities.       No evidence of inducible myocardial ischemia or prior infarction.   Likely diaphragmatic attenuation along the inferior wall.   The left ventricle is normal in size.   Normal LV wall motion with a post-stress LV EF estimated at 64%   I personally reviewed the images/study and I agree with the resident Forest Reid's findings as stated. This study was interpreted at [facility], [place], [place].   MACRO: None       Signed by: Armani Boogie 12/5/2024 11:49 AM Dictation workstation:   [id number]    ECG 12 lead (Clinic Performed)    Result Date: 11/25/2024  Atrial paced, LVH with repol, inferior Q waves       Assessment /Plan    Assessment & Plan  3-vessel coronary artery disease    Coronary artery disease involving native coronary artery of native heart without angina pectoris    Angina pectoris, unstable (Multi)    Diastolic congestive heart failure    Wayne Burkitt is a 75-year-old male with a prior cardiac history of CAD, CABG, hypertension, ICM, exertional angina, vascular dementia, s/p 16 stents, s/p 3 R inguinal hernia repairs, and seizures admitted to the hospital for a left heart cath complicated by a large scrotal hematoma and hg of 5.8. He was admitted to the ICU. Urology was consulted and said ntd emergently, low suspicion for testicular ischemia just from fluid. Cardiology did not want to transfuse immediately because the patient wasn't symptomatic. Dr. Giron requested to give the patient IVF to increase his blood pressure and keep him hemodynamically stable.   His anemia seemed more like dilutional because all of the cell lines were decreased. CT did not show any fluid extravasation or retroperitoneal bleeds. His most recent Hg was 6.7 so he is improving. He's making urine appropriately.  The patient was downgraded to the floor for further observation  and has been doing well.    # Right groin hematoma  # Acute on chronic anemia s/p cath  # CAD  -US scrotum unremarkable, urology consulted and reassured that testicle is unlikely to have ischemia  -LE US arterial duplex showed >50% stenosis of external iliac artery, no aneurysm noted  -Hgb of 6.0 11/22/24, most likely dilutional anemia (previously not transfused per cardiology unless he is symptomatic/hemodynamically unstable)  -Has Hx of blood transfusions before/during his stent placements  Plan:  -PRBC transfusion ordered  -PRN LR's  -Continue to monitor for any changes in pain and any signs of SOB/lightheadedness/hemodynamic instability  -Effient for DVT prophylaxis and for his stents   -Last echo: 12/05/24, LVEF 55-60%  -PT/OT consulted, pending recs    # Seizures  # HTN  - Continue home medications as indicated    Consultants: Urology, cardiology  Diet: Cardiac  mIVF: PRN  Code Status: Full code  DVT Prophylaxis: Effient  Disposition: Pending imaging and PT/OT    This assessment and plan will be discussed with the attending physician Dr. Grady.    Allie Salinas,   Family Medicine, PGY-1    This note may have been transcribed using Dragon voice recognition system and there is a possibility of unintentional typing misprints.  Any information found to be copied from previous providers is done in the best interest of the patient to provide accurate, quality, and continuity of care.       I saw this patient with the above intern. The Assessment and Plan reflect my input. My Edits are included in the above documentation.    This assessment and plan to be discussed with the attending physician.    Nallely Islas,   Family Medicine, PGY-2

## 2024-12-23 DIAGNOSIS — I10 ESSENTIAL HYPERTENSION: ICD-10-CM

## 2024-12-24 RX ORDER — LOSARTAN POTASSIUM 25 MG/1
25 TABLET ORAL DAILY
Qty: 90 TABLET | Refills: 1 | Status: SHIPPED | OUTPATIENT
Start: 2024-12-24

## 2024-12-30 ENCOUNTER — ALLIED HEALTH/NURSE VISIT (OUTPATIENT)
Dept: FAMILY MEDICINE | Facility: CLINIC | Age: 69
End: 2024-12-30
Payer: COMMERCIAL

## 2024-12-30 DIAGNOSIS — E53.8 VITAMIN B12 DEFICIENCY (NON ANEMIC): Primary | ICD-10-CM

## 2024-12-30 PROCEDURE — 99207 PR NO CHARGE NURSE ONLY: CPT

## 2024-12-30 PROCEDURE — 96372 THER/PROPH/DIAG INJ SC/IM: CPT | Performed by: INTERNAL MEDICINE

## 2024-12-30 RX ADMIN — CYANOCOBALAMIN 1000 MCG: 1000 INJECTION, SOLUTION INTRAMUSCULAR; SUBCUTANEOUS at 13:06

## 2024-12-30 NOTE — PROGRESS NOTES
Clinic Administered Medication Documentation      Injectable Medication Documentation    Is there an active order (written within the past 365 days, with administrations remaining, not ) in the chart? Yes.     Patient was given Cyanocobalamin (B-12). Prior to medication administration, verified patient's identity using patient s name and date of birth. Please see MAR and medication order for additional information. Patient instructed to remain in clinic for 15 minutes and report any adverse reaction to staff immediately.    Vial/Syringe: Single dose vial. Was entire vial of medication used? Yes  Was this medication supplied by the patient? No  Is this a medication the patient will need to receive again? Yes. Verified that the patient has refills remaining in their prescription.    Valorie Linares, RN, BSN

## 2025-01-14 DIAGNOSIS — E78.5 HYPERLIPIDEMIA LDL GOAL <100: ICD-10-CM

## 2025-01-14 RX ORDER — ROSUVASTATIN CALCIUM 20 MG/1
20 TABLET, COATED ORAL DAILY
Qty: 90 TABLET | Refills: 1 | Status: SHIPPED | OUTPATIENT
Start: 2025-01-14

## 2025-01-29 ENCOUNTER — ALLIED HEALTH/NURSE VISIT (OUTPATIENT)
Dept: FAMILY MEDICINE | Facility: CLINIC | Age: 70
End: 2025-01-29
Payer: COMMERCIAL

## 2025-01-29 DIAGNOSIS — E53.8 VITAMIN B12 DEFICIENCY (NON ANEMIC): Primary | ICD-10-CM

## 2025-01-29 PROCEDURE — 96372 THER/PROPH/DIAG INJ SC/IM: CPT | Performed by: INTERNAL MEDICINE

## 2025-01-29 PROCEDURE — 99207 PR NO CHARGE NURSE ONLY: CPT

## 2025-01-29 RX ADMIN — CYANOCOBALAMIN 1000 MCG: 1000 INJECTION, SOLUTION INTRAMUSCULAR; SUBCUTANEOUS at 11:04

## 2025-03-05 ENCOUNTER — ALLIED HEALTH/NURSE VISIT (OUTPATIENT)
Dept: FAMILY MEDICINE | Facility: CLINIC | Age: 70
End: 2025-03-05
Payer: COMMERCIAL

## 2025-03-05 DIAGNOSIS — Z23 ENCOUNTER FOR IMMUNIZATION: Primary | ICD-10-CM

## 2025-03-05 PROCEDURE — 96372 THER/PROPH/DIAG INJ SC/IM: CPT | Performed by: INTERNAL MEDICINE

## 2025-03-05 PROCEDURE — 99207 PR NO CHARGE NURSE ONLY: CPT

## 2025-03-05 RX ADMIN — CYANOCOBALAMIN 1000 MCG: 1000 INJECTION, SOLUTION INTRAMUSCULAR; SUBCUTANEOUS at 13:05

## 2025-03-05 NOTE — PROGRESS NOTES
Prior to immunization administration, verified patients identity using patient s name and date of birth. Please see Immunization Activity for additional information.     Screening Questionnaire for Adult Immunization    Are you sick today?   No   Do you have allergies to medications, food, a vaccine component or latex?   No   Have you ever had a serious reaction after receiving a vaccination?   No   Do you have a long-term health problem with heart, lung, kidney, or metabolic disease (e.g., diabetes), asthma, a blood disorder, no spleen, complement component deficiency, a cochlear implant, or a spinal fluid leak?  Are you on long-term aspirin therapy?   No   Do you have cancer, leukemia, HIV/AIDS, or any other immune system problem?   No   Do you have a parent, brother, or sister with an immune system problem?   No   In the past 3 months, have you taken medications that affect  your immune system, such as prednisone, other steroids, or anticancer drugs; drugs for the treatment of rheumatoid arthritis, Crohn s disease, or psoriasis; or have you had radiation treatments?   No   Have you had a seizure, or a brain or other nervous system problem?   No   During the past year, have you received a transfusion of blood or blood    products, or been given immune (gamma) globulin or antiviral drug?   No   For women: Are you pregnant or is there a chance you could become       pregnant during the next month?   No   Have you received any vaccinations in the past 4 weeks?   No     Immunization questionnaire answers were all negative.    I have reviewed the following standing orders: Not Applicable; Order were already placed prior to ancillary visit.      Patient instructed to remain in clinic for 15 minutes afterwards, and to report any adverse reactions.     Screening performed by William Brambila MA on 3/5/2025 at 1:09 PM.

## 2025-03-12 ENCOUNTER — TRANSFERRED RECORDS (OUTPATIENT)
Dept: HEALTH INFORMATION MANAGEMENT | Facility: CLINIC | Age: 70
End: 2025-03-12
Payer: COMMERCIAL

## 2025-03-20 ENCOUNTER — OFFICE VISIT (OUTPATIENT)
Dept: CARDIOLOGY | Facility: CLINIC | Age: 70
End: 2025-03-20
Payer: COMMERCIAL

## 2025-03-20 VITALS
BODY MASS INDEX: 36.06 KG/M2 | WEIGHT: 191 LBS | RESPIRATION RATE: 16 BRPM | DIASTOLIC BLOOD PRESSURE: 73 MMHG | OXYGEN SATURATION: 98 % | HEART RATE: 62 BPM | SYSTOLIC BLOOD PRESSURE: 115 MMHG | HEIGHT: 61 IN

## 2025-03-20 DIAGNOSIS — E78.5 DYSLIPIDEMIA, GOAL LDL BELOW 70: ICD-10-CM

## 2025-03-20 DIAGNOSIS — I25.10 CORONARY ARTERY DISEASE INVOLVING NATIVE CORONARY ARTERY OF NATIVE HEART WITHOUT ANGINA PECTORIS: Primary | ICD-10-CM

## 2025-03-20 DIAGNOSIS — I26.09 ACUTE PULMONARY EMBOLISM WITH ACUTE COR PULMONALE, UNSPECIFIED PULMONARY EMBOLISM TYPE (H): ICD-10-CM

## 2025-03-20 DIAGNOSIS — R60.0 BILATERAL LEG EDEMA: ICD-10-CM

## 2025-03-20 DIAGNOSIS — E66.01 CLASS 2 SEVERE OBESITY DUE TO EXCESS CALORIES WITH SERIOUS COMORBIDITY AND BODY MASS INDEX (BMI) OF 35.0 TO 35.9 IN ADULT (H): ICD-10-CM

## 2025-03-20 DIAGNOSIS — I10 ESSENTIAL HYPERTENSION: ICD-10-CM

## 2025-03-20 DIAGNOSIS — E66.812 CLASS 2 SEVERE OBESITY DUE TO EXCESS CALORIES WITH SERIOUS COMORBIDITY AND BODY MASS INDEX (BMI) OF 35.0 TO 35.9 IN ADULT (H): ICD-10-CM

## 2025-03-20 RX ORDER — AMOXICILLIN 500 MG/1
500 CAPSULE ORAL
COMMUNITY
Start: 2024-09-24

## 2025-03-20 NOTE — LETTER
3/20/2025    Suman Doshi MD  [address] [place] [address]    RE: Dandy Mejia       Dear Colleague,     I had the pleasure of seeing Dandy Mejia in the [facility].            Assessment/Plan:   Calcified coronary artery disease: The patient has no chest pain or shortness of breath on exertion.  Her coronary CT angiogram in March 2024 was reported total coronary calcium score 584, mild LAD and RCA stenosis, no obstructive lesion.  Echocardiogram was reported normal LV size and wall motion, no obvious valvular disease.  Continue lifestyle modification and risk factor control.      Benign essential hypertension: Her blood pressure is controlled with hydrochlorothiazide 25 mg daily, losartan 25 mg daily.  Dyslipidemia: LDL was well controlled with rosuvastatin 20 mg at bedtime.  Bilateral leg edema: The patient has mild right leg edema after right knee surgery.  She has been using elastic support socks.  She has been taking hydrochlorothiazide, use Lasix as needed.  Obesity, acute pulmonary embolism: We discussed a low-salt diet, lifestyle modification.  The patient is on Eliquis for pulmonary embolism.    Thank you for the opportunity to be involved in the care of Dandy Mejia. If you have any questions, please feel free to contact me.  I will see the patient again in 12 months and as needed.    Much or all of the text in this note was generated through the use of Dragon Dictate voice-to-text software. Errors in spelling or words which seem out of context are unintentional. Sound alike errors, in particular, may have escaped editing.       History of Present Illness:   It is my pleasure to see Dandy Mejia at the [facility] clinic for routine cardiology follow up.  Dandy Mejia is a 70 year old female with a medical history of calcified coronary artery disease, benign essential hypertension, dyslipidemia, obesity, obstructive sleep apnea, pulmonary  "embolism.    The patient states that she has been doing quite well over the last year from cardiology standpoint.  She denies chest pain, shortness of breath on exertion, palpitations, dizziness, orthopnea, PND.  She has mild leg edema which has been controlled very well.  Her blood pressure and heart rate are well-controlled.  Last LDL was 25.    Past Medical History:     Patient Active Problem List   Diagnosis     Obstructive sleep apnea     Thrombophlebitis Of The Left Deep Femoral Vein     Post-gastric Bypass For Obesity     Nephrolithiasis     Adenomatous colon polyp (02/2014)     Essential hypertension     Dyspnea on exertion     Acute pulmonary embolism with acute cor pulmonale, unspecified pulmonary embolism type (H)     Class 2 severe obesity due to excess calories with serious comorbidity in adult (H)     Dyslipidemia, goal LDL below 70     Bilateral leg edema     Coronary artery calcification seen on CAT scan     Postmenopausal atrophic vaginitis     Acquired absence of organ, genital organs       Past Surgical History:     Past Surgical History:   Procedure Laterality Date     CHOLECYSTECTOMY       GASTRIC BYPASS  1980     HYSTERECTOMY TOTAL ABDOMINAL  1993     RELEASE CARPAL TUNNEL Right      TONSILLECTOMY       TUBAL LIGATION         Family History:     Family History   Problem Relation Age of Onset     Heart Failure Mother      Atrial fibrillation Mother      Macular Degeneration Mother      Diabetes Type 2  Father         Older-age onset of diabetes for multiple family members in father's side     Cancer Sister         \"Female cancer wi lymph node removal\"     Glaucoma Sister         Being watched for glaucoma     Depression Sister      Fibromyalgia Sister      Glaucoma Sister         Social History:    reports that she has never smoked. She has been exposed to tobacco smoke. She has never used smokeless tobacco. She reports current alcohol use of about 7.0 standard drinks of alcohol per week. She " reports that she does not use drugs.    Review of Systems:   12 systems are reviewed negative except for in HPI.    Meds:     Current Outpatient Medications:      amoxicillin (AMOXIL) 500 MG capsule, Take 500 mg by mouth. Before Dental appt., Disp: , Rfl:      apixaban ANTICOAGULANT (ELIQUIS ANTICOAGULANT) 5 MG tablet, Take 1 tablet (5 mg) by mouth 2 times daily., Disp: 180 tablet, Rfl: 3     Calcium Carbonate-Vit D-Min (CALCIUM 1200 PO), Take 1 tablet by mouth 2 times daily, Disp: , Rfl:      cholecalciferol, vitamin D3, 5,000 unit Tab, [CHOLECALCIFEROL, VITAMIN D3, 5,000 UNIT TAB] Take 1 tablet by mouth daily. , Disp: , Rfl:      famotidine (PEPCID) 20 MG tablet, Take 1 tablet (20 mg) by mouth as needed (heartburn), Disp: , Rfl:      ferrous sulfate 325 (65 FE) MG tablet, [FERROUS SULFATE 325 (65 FE) MG TABLET] Take 325 mg by mouth., Disp: , Rfl:      furosemide (LASIX) 40 MG tablet, Take 1 tablet (40 mg) by mouth as needed (leeg edema), Disp: 30 tablet, Rfl: 1     hydrochlorothiazide (HYDRODIURIL) 25 MG tablet, TAKE ONE TABLET BY MOUTH EVERY DAY, Disp: 90 tablet, Rfl: 2     losartan (COZAAR) 25 MG tablet, Take 1 tablet (25 mg) by mouth daily., Disp: 90 tablet, Rfl: 1     Probiotic Product (DAILY DIGESTIVE PROBIOTIC PO), Take 1 capsule by mouth daily, Disp: , Rfl:      rosuvastatin (CRESTOR) 20 MG tablet, TAKE ONE TABLET BY MOUTH EVERY DAY, Disp: 90 tablet, Rfl: 1     triamcinolone (KENALOG) 0.1 % cream, [TRIAMCINOLONE (KENALOG) 0.1 % CREAM] Apply to affected areas 3 times a day for 2 weeks, Disp: 45 g, Rfl: 1    Current Facility-Administered Medications:      cyanocobalamin injection 1,000 mcg, 1,000 mcg, Intramuscular, Q30 Days, Suman Doshi MD, 1,000 mcg at 03/05/25 1305     cyanocobalamin injection 1,000 mcg, 1,000 mcg, Intramuscular, Q30 Days, Suman Doshi MD, 1,000 mcg at 01/29/25 1104    Allergies:   Patient has no known allergies.      Objective:      Physical Exam  86.6 kg (191 lb)  1.556 m (5'  "1.25\")  Body mass index is 35.8 kg/m .  /73 (BP Location: Left arm, Patient Position: Sitting, Cuff Size: Adult Regular)   Pulse 62   Resp 16   Ht 1.556 m (5' 1.25\")   Wt 86.6 kg (191 lb)   LMP  (LMP Unknown)   SpO2 98%   BMI 35.80 kg/m      General Appearance:   Awake, Alert, No acute distress.   HEENT:  Pupil equal and reactive to light. No scleral icterus; the mucous membranes were moist.   Neck: No cervical bruits. No JVD. No thyromegaly.     Chest: The spine was straight. The chest was symmetric.   Lungs:   Respirations unlabored; Lungs are clear to auscultation. No crackles. No wheezing.   Cardiovascular:   Regular rhythm and rate, normal first and second heart sounds with no murmurs. No rubs or gallops.    Abdomen:  Obese. Soft. No tenderness. Non-distended. Bowels sounds are present   Extremities: Equal tibial pulses. Mild bilateral leg edema.   Skin: No rashes or ulcers. Warm, Dry.   Musculoskeletal: No tenderness. No deformity.   Neurologic: Mood and affect are appropriate. No focal deficits.         EKG: Personally reviewed  Sinus rhythm   Normal ECG   No previous ECGs available     Cardiac Imaging Studies  ECHO on 12-:  Left ventricular size, wall motion and function are normal. The ejection  fraction is 60-65%.  Normal right ventricle size and systolic function.  Right ventricle systolic pressure estimate normal  No hemodynamically significant valvular abnormalities on 2D or color flow  imaging.    Coronary CT angiogram on March 21, 2024:     The total Agatston score is 584. A calcium score in this range places the individual in the 93rd percentile when compared to an age and gender matched control group.     Nonobstructive coronary artery disease comprised primarily of calcific plaque with an overall moderate burden of atherosclerosis.    Lab Review   Lab Results   Component Value Date     01/29/2024    CO2 31 01/29/2024    CO2 27 10/21/2021    BUN 9.2 01/29/2024    BUN 8 " "10/21/2021     Lab Results   Component Value Date    WBC 6.3 01/29/2024    HGB 13.0 01/29/2024    HCT 39.3 01/29/2024    MCV 98 01/29/2024     01/29/2024     Lab Results   Component Value Date    CHOL 95 08/28/2024    CHOL 113 04/05/2024    CHOL 195 08/17/2023     Lab Results   Component Value Date    HDL 53 08/28/2024    HDL 61 04/05/2024    HDL 71 08/17/2023     No components found for: \"LDLCALC\"  Lab Results   Component Value Date    TRIG 85 08/28/2024    TRIG 114 04/05/2024    TRIG 113 08/17/2023     Lab Results   Component Value Date    TSH 1.70 12/22/2023                 Thank you for allowing me to participate in the care of your patient.      Sincerely,     Karey Kemp MD     [facility]  cc:   Karey Kemp MD  [facility] RONNIE ROLDAN [facility] [address]  [place] [address]      "

## 2025-04-07 ENCOUNTER — ALLIED HEALTH/NURSE VISIT (OUTPATIENT)
Dept: FAMILY MEDICINE | Facility: CLINIC | Age: 70
End: 2025-04-07
Payer: COMMERCIAL

## 2025-04-07 DIAGNOSIS — E53.8 VITAMIN B12 DEFICIENCY (NON ANEMIC): Primary | ICD-10-CM

## 2025-04-07 PROCEDURE — 99207 PR NO CHARGE NURSE ONLY: CPT

## 2025-04-07 PROCEDURE — 96372 THER/PROPH/DIAG INJ SC/IM: CPT | Performed by: INTERNAL MEDICINE

## 2025-04-07 RX ADMIN — CYANOCOBALAMIN 1000 MCG: 1000 INJECTION, SOLUTION INTRAMUSCULAR; SUBCUTANEOUS at 11:13

## 2025-04-27 DIAGNOSIS — E78.5 HYPERLIPIDEMIA LDL GOAL <100: ICD-10-CM

## 2025-04-27 DIAGNOSIS — R60.0 BILATERAL LEG EDEMA: ICD-10-CM

## 2025-04-27 DIAGNOSIS — I10 ESSENTIAL HYPERTENSION: ICD-10-CM

## 2025-04-28 RX ORDER — LOSARTAN POTASSIUM 25 MG/1
25 TABLET ORAL DAILY
Qty: 90 TABLET | Refills: 0 | Status: SHIPPED | OUTPATIENT
Start: 2025-04-28

## 2025-04-28 RX ORDER — ROSUVASTATIN CALCIUM 20 MG/1
20 TABLET, COATED ORAL DAILY
Qty: 90 TABLET | Refills: 0 | Status: SHIPPED | OUTPATIENT
Start: 2025-04-28

## 2025-04-28 RX ORDER — HYDROCHLOROTHIAZIDE 25 MG/1
25 TABLET ORAL DAILY
Qty: 90 TABLET | Refills: 0 | Status: SHIPPED | OUTPATIENT
Start: 2025-04-28

## 2025-04-29 RX ORDER — FUROSEMIDE 40 MG/1
TABLET ORAL
Qty: 30 TABLET | Refills: 0 | Status: SHIPPED | OUTPATIENT
Start: 2025-04-29

## 2025-05-07 ENCOUNTER — ALLIED HEALTH/NURSE VISIT (OUTPATIENT)
Dept: FAMILY MEDICINE | Facility: CLINIC | Age: 70
End: 2025-05-07
Payer: COMMERCIAL

## 2025-05-07 DIAGNOSIS — E53.8 VITAMIN B12 DEFICIENCY (NON ANEMIC): Primary | ICD-10-CM

## 2025-05-07 PROCEDURE — 96372 THER/PROPH/DIAG INJ SC/IM: CPT | Performed by: INTERNAL MEDICINE

## 2025-05-07 PROCEDURE — 99207 PR NO CHARGE NURSE ONLY: CPT

## 2025-05-07 RX ADMIN — CYANOCOBALAMIN 1000 MCG: 1000 INJECTION, SOLUTION INTRAMUSCULAR; SUBCUTANEOUS at 10:59

## 2025-06-09 ENCOUNTER — ALLIED HEALTH/NURSE VISIT (OUTPATIENT)
Dept: FAMILY MEDICINE | Facility: CLINIC | Age: 70
End: 2025-06-09
Payer: COMMERCIAL

## 2025-06-09 DIAGNOSIS — E53.8 VITAMIN B12 DEFICIENCY (NON ANEMIC): Primary | ICD-10-CM

## 2025-06-09 PROCEDURE — 99207 PR NO CHARGE NURSE ONLY: CPT

## 2025-06-09 PROCEDURE — 96372 THER/PROPH/DIAG INJ SC/IM: CPT | Performed by: INTERNAL MEDICINE

## 2025-06-09 RX ADMIN — CYANOCOBALAMIN 1000 MCG: 1000 INJECTION, SOLUTION INTRAMUSCULAR; SUBCUTANEOUS at 11:06

## 2025-07-07 ENCOUNTER — ALLIED HEALTH/NURSE VISIT (OUTPATIENT)
Dept: FAMILY MEDICINE | Facility: CLINIC | Age: 70
End: 2025-07-07
Payer: COMMERCIAL

## 2025-07-07 DIAGNOSIS — E53.8 VITAMIN B12 DEFICIENCY (NON ANEMIC): Primary | ICD-10-CM

## 2025-07-07 PROCEDURE — 99207 PR NO CHARGE NURSE ONLY: CPT

## 2025-07-07 PROCEDURE — 96372 THER/PROPH/DIAG INJ SC/IM: CPT | Performed by: INTERNAL MEDICINE

## 2025-07-07 RX ADMIN — CYANOCOBALAMIN 1000 MCG: 1000 INJECTION, SOLUTION INTRAMUSCULAR; SUBCUTANEOUS at 11:15

## 2025-08-04 ENCOUNTER — TELEPHONE (OUTPATIENT)
Dept: INTERNAL MEDICINE | Facility: CLINIC | Age: 70
End: 2025-08-04

## 2025-08-04 ENCOUNTER — ALLIED HEALTH/NURSE VISIT (OUTPATIENT)
Dept: FAMILY MEDICINE | Facility: CLINIC | Age: 70
End: 2025-08-04
Payer: COMMERCIAL

## 2025-08-04 DIAGNOSIS — Z98.84 HISTORY OF GASTRIC BYPASS: ICD-10-CM

## 2025-08-04 DIAGNOSIS — E53.8 VITAMIN B12 DEFICIENCY (NON ANEMIC): Primary | ICD-10-CM

## 2025-08-04 PROCEDURE — 99207 PR NO CHARGE NURSE ONLY: CPT

## 2025-08-04 PROCEDURE — 96372 THER/PROPH/DIAG INJ SC/IM: CPT | Performed by: INTERNAL MEDICINE

## 2025-08-04 RX ORDER — CYANOCOBALAMIN 1000 UG/ML
1000 INJECTION, SOLUTION INTRAMUSCULAR; SUBCUTANEOUS
Status: ACTIVE | OUTPATIENT
Start: 2025-08-04 | End: 2026-07-30

## 2025-08-04 RX ADMIN — CYANOCOBALAMIN 1000 MCG: 1000 INJECTION, SOLUTION INTRAMUSCULAR; SUBCUTANEOUS at 11:12

## 2025-08-29 PROBLEM — E66.01 CLASS 2 SEVERE OBESITY DUE TO EXCESS CALORIES WITH SERIOUS COMORBIDITY IN ADULT (H): Status: RESOLVED | Noted: 2024-01-29 | Resolved: 2025-08-29

## 2025-08-29 PROBLEM — E66.812 CLASS 2 SEVERE OBESITY DUE TO EXCESS CALORIES WITH SERIOUS COMORBIDITY IN ADULT (H): Status: RESOLVED | Noted: 2024-01-29 | Resolved: 2025-08-29

## 2025-09-02 ENCOUNTER — RESULTS FOLLOW-UP (OUTPATIENT)
Dept: INTERNAL MEDICINE | Facility: CLINIC | Age: 70
End: 2025-09-02
Payer: COMMERCIAL

## 2025-09-03 ENCOUNTER — ALLIED HEALTH/NURSE VISIT (OUTPATIENT)
Dept: FAMILY MEDICINE | Facility: CLINIC | Age: 70
End: 2025-09-03
Payer: COMMERCIAL

## 2025-09-03 DIAGNOSIS — E53.8 VITAMIN B12 DEFICIENCY (NON ANEMIC): Primary | ICD-10-CM

## 2025-09-03 PROCEDURE — 96372 THER/PROPH/DIAG INJ SC/IM: CPT | Performed by: INTERNAL MEDICINE

## 2025-09-03 PROCEDURE — 99207 PR NO CHARGE NURSE ONLY: CPT

## 2025-09-03 RX ADMIN — CYANOCOBALAMIN 1000 MCG: 1000 INJECTION, SOLUTION INTRAMUSCULAR; SUBCUTANEOUS at 11:04
